# Patient Record
Sex: FEMALE | Race: WHITE | NOT HISPANIC OR LATINO | Employment: FULL TIME | URBAN - METROPOLITAN AREA
[De-identification: names, ages, dates, MRNs, and addresses within clinical notes are randomized per-mention and may not be internally consistent; named-entity substitution may affect disease eponyms.]

---

## 2017-12-14 ENCOUNTER — OFFICE VISIT (OUTPATIENT)
Dept: URGENT CARE | Facility: PHYSICIAN GROUP | Age: 47
End: 2017-12-14
Payer: COMMERCIAL

## 2017-12-14 ENCOUNTER — TELEPHONE (OUTPATIENT)
Dept: URGENT CARE | Facility: PHYSICIAN GROUP | Age: 47
End: 2017-12-14

## 2017-12-14 VITALS
WEIGHT: 224 LBS | SYSTOLIC BLOOD PRESSURE: 128 MMHG | BODY MASS INDEX: 38.24 KG/M2 | OXYGEN SATURATION: 97 % | HEART RATE: 80 BPM | TEMPERATURE: 98.1 F | DIASTOLIC BLOOD PRESSURE: 76 MMHG | RESPIRATION RATE: 16 BRPM | HEIGHT: 64 IN

## 2017-12-14 DIAGNOSIS — R05.9 COUGH: ICD-10-CM

## 2017-12-14 PROCEDURE — 99203 OFFICE O/P NEW LOW 30 MIN: CPT | Performed by: EMERGENCY MEDICINE

## 2017-12-14 RX ORDER — CODEINE PHOSPHATE AND GUAIFENESIN 10; 100 MG/5ML; MG/5ML
5 SOLUTION ORAL EVERY 6 HOURS PRN
Qty: 200 ML | Refills: 0 | Status: SHIPPED | OUTPATIENT
Start: 2017-12-14 | End: 2017-12-24

## 2017-12-14 ASSESSMENT — ENCOUNTER SYMPTOMS
SENSORY CHANGE: 0
EYE DISCHARGE: 0
SINUS PAIN: 0
VOMITING: 0
ABDOMINAL PAIN: 0
SHORTNESS OF BREATH: 0
NAUSEA: 0
EYE REDNESS: 0
NERVOUS/ANXIOUS: 0
STRIDOR: 0
COUGH: 1
SPEECH CHANGE: 0
NECK PAIN: 0
FEVER: 0
SPUTUM PRODUCTION: 0
SORE THROAT: 0

## 2017-12-14 NOTE — LETTER
December 14, 2017        Deann Torres  [address] [facility] [address]        Dear Deann:    Please ask for yesterday, today and tomorrow off from work for medical reasons.    If you have any questions or concerns, please don't hesitate to call.        Sincerely,        Josep Pérez M.D.    Electronically Signed

## 2017-12-14 NOTE — PROGRESS NOTES
"Subjective:      Deann Torres is a 47 y.o. female who presents with Cough (with drainage, thorat is sore due to coughing so hard x 4 days )            HPI  Patient is a 47-year-old female complaining of a persistent cough for 4 days.Patient denies any fever chills nausea vomiting or diarrhea. Patient's cough is essentially nonproductive.  No Known Allergies  Social History     Social History   • Marital status: Single     Spouse name: N/A   • Number of children: N/A   • Years of education: N/A     Occupational History   • Not on file.     Social History Main Topics   • Smoking status: Not on file   • Smokeless tobacco: Not on file   • Alcohol use Not on file   • Drug use: Unknown   • Sexual activity: Not on file     Other Topics Concern   • Not on file     Social History Narrative   • No narrative on file   No past medical history on file.   No current outpatient prescriptions on file prior to visit.     No current facility-administered medications on file prior to visit.    No family history on file.  Review of Systems   Constitutional: Negative for fever.   HENT: Positive for congestion. Negative for sinus pain and sore throat.    Eyes: Negative for discharge and redness.   Respiratory: Positive for cough. Negative for sputum production, shortness of breath and stridor.    Gastrointestinal: Negative for abdominal pain, nausea and vomiting.   Genitourinary: Negative.    Musculoskeletal: Negative for neck pain.   Skin: Negative for rash.   Neurological: Negative for sensory change and speech change.   Psychiatric/Behavioral: The patient is not nervous/anxious.           Objective:     /76   Pulse 80   Temp 36.7 °C (98.1 °F)   Resp 16   Ht 1.626 m (5' 4\")   Wt 101.6 kg (224 lb)   LMP 11/30/2017   SpO2 97%   BMI 38.45 kg/m²      Physical Exam   Constitutional: She appears well-developed and well-nourished.   HENT:   Head: Normocephalic and atraumatic.   Cardiovascular: Normal rate and regular rhythm.  "   Pulmonary/Chest: Breath sounds normal.   Musculoskeletal: Normal range of motion.   Skin: Skin is warm and dry.   Psychiatric: She has a normal mood and affect. Her behavior is normal.              no data  Assessment/Plan:     DX: Acute URI    I am recommending the patient initiate/ continue hydration efforts including the use of a vaporizer/humidifier/ netti pot. I also recommend the pt, initiate Mucinex DM or use Cheratussin but only at home because it contains a narcotic and will make you sleepy.  . If the patient's condition exacerbates with worsening dysphagia, shortness of breath, uncontrolled fever, headache or chest pressure he/she will return immediately to the urgent care or go to  the emergency department for further evaluation.    Josep Pérez

## 2017-12-15 ENCOUNTER — OFFICE VISIT (OUTPATIENT)
Dept: URGENT CARE | Facility: PHYSICIAN GROUP | Age: 47
End: 2017-12-15
Payer: COMMERCIAL

## 2017-12-15 VITALS
SYSTOLIC BLOOD PRESSURE: 140 MMHG | BODY MASS INDEX: 37.73 KG/M2 | HEIGHT: 64 IN | HEART RATE: 102 BPM | RESPIRATION RATE: 16 BRPM | DIASTOLIC BLOOD PRESSURE: 86 MMHG | WEIGHT: 221 LBS | TEMPERATURE: 99.1 F | OXYGEN SATURATION: 97 %

## 2017-12-15 DIAGNOSIS — R05.9 COUGH: ICD-10-CM

## 2017-12-15 DIAGNOSIS — J04.0 LARYNGITIS: Primary | ICD-10-CM

## 2017-12-15 PROCEDURE — 99212 OFFICE O/P EST SF 10 MIN: CPT | Performed by: EMERGENCY MEDICINE

## 2017-12-15 RX ORDER — METHYLPREDNISOLONE 4 MG/1
TABLET ORAL
Qty: 1 KIT | Refills: 0 | Status: SHIPPED | OUTPATIENT
Start: 2017-12-15 | End: 2018-07-24

## 2017-12-15 RX ORDER — CODEINE PHOSPHATE AND GUAIFENESIN 10; 100 MG/5ML; MG/5ML
5 SOLUTION ORAL EVERY 6 HOURS PRN
Qty: 200 ML | Refills: 0 | Status: SHIPPED | OUTPATIENT
Start: 2017-12-15 | End: 2017-12-25

## 2017-12-15 NOTE — LETTER
December 15, 2017        Deann Torres  [address] [facility] [address]        Dear Deann:    Please ask for the next 2-4 days off from work.    If you have any questions or concerns, please don't hesitate to call.        Sincerely,        Josep Pérez M.D.    Electronically Signed

## 2017-12-16 NOTE — PROGRESS NOTES
"Subjective:      Deann Torres is a 47 y.o. female who presents with Pharyngitis (seen yesterday, laryngitis)            HPI  Cough and hoarse for the past 4 days, did not get a flu shot yet. Patient complains primarily of hoarse voice. Difficulty speaking. Patient denies any chills nausea vomiting or diarrhea.    No Known Allergies   Social History     Social History   • Marital status: Single     Spouse name: N/A   • Number of children: N/A   • Years of education: N/A     Occupational History   • Not on file.     Social History Main Topics   • Smoking status: Never Smoker   • Smokeless tobacco: Never Used   • Alcohol use Not on file   • Drug use: Unknown   • Sexual activity: Not on file     Other Topics Concern   • Not on file     Social History Narrative   • No narrative on file   .......No past medical history on file.   Current Outpatient Prescriptions on File Prior to Visit   Medication Sig Dispense Refill   • guaifenesin-codeine (CHERATUSSIN AC) Solution oral solution Take 5 mL by mouth every 6 hours as needed for up to 10 days. 200 mL 0     No current facility-administered medications on file prior to visit.    No family history on file.  Review of Systems   Constitutional: Negative for chills and fever.   HENT:        Hoarseness predominant symptom.   Respiratory: Positive for cough. Negative for hemoptysis and sputum production.    Cardiovascular: Negative for chest pain.   Gastrointestinal: Negative for abdominal pain, nausea and vomiting.   Musculoskeletal: Negative for back pain and neck pain.   Skin: Negative for rash.   Neurological: Positive for speech change. Negative for sensory change.   Psychiatric/Behavioral: The patient is not nervous/anxious.           Objective:     /86   Pulse (!) 102   Temp 37.3 °C (99.1 °F)   Resp 16   Ht 1.626 m (5' 4\")   Wt 100.2 kg (221 lb)   LMP 11/30/2017   SpO2 97%   BMI 37.93 kg/m²      Physical Exam   Constitutional: She appears well-developed and " well-nourished. No distress.   HENT:   Head: Normocephalic and atraumatic.   Right Ear: External ear normal.   Left Ear: External ear normal.   Mouth/Throat: No oropharyngeal exudate.   Eyes: Conjunctivae are normal. Right eye exhibits no discharge. Left eye exhibits no discharge.   Neck:   Hoarse voice present. No stridor.   Cardiovascular: Normal heart sounds.    Heart rate 102   Pulmonary/Chest: Breath sounds normal. No stridor. No respiratory distress. She has no wheezes. She has no rales.   Abdominal: She exhibits no distension. There is no tenderness.   Lymphadenopathy:     She has no cervical adenopathy.   Skin: She is not diaphoretic.   Psychiatric: She has a normal mood and affect. Her behavior is normal.              no data  Assessment/Plan:     DX URI            Laryngitis        I am recommending the patient initiate/ continue hydration efforts including the use of a vaporizer/humidifier/ netti pot. I also recommend the pt, initiate Mucinex (if older than 4) Sudafed or Dayquil if not hypertensive. In addition the patient will initiate the prescribed prescription medication/s:Cheratussin for cough as well as Medrol Dosepak for laryngitis. If the patient's condition exacerbates with worsening dysphagia, shortness of breath, uncontrolled fever, headache or chest pressure he/she will return immediately to the urgent care or go to  the emergency department for further evaluation.    Josep Pérez

## 2017-12-19 ENCOUNTER — TELEPHONE (OUTPATIENT)
Dept: URGENT CARE | Facility: PHYSICIAN GROUP | Age: 47
End: 2017-12-19

## 2017-12-19 ASSESSMENT — ENCOUNTER SYMPTOMS
FEVER: 0
SPEECH CHANGE: 1
VOMITING: 0
BACK PAIN: 0
NECK PAIN: 0
COUGH: 1
NERVOUS/ANXIOUS: 0
SENSORY CHANGE: 0
ABDOMINAL PAIN: 0
SPUTUM PRODUCTION: 0
CHILLS: 0
NAUSEA: 0
HEMOPTYSIS: 0

## 2018-01-04 ENCOUNTER — OFFICE VISIT (OUTPATIENT)
Dept: URGENT CARE | Facility: CLINIC | Age: 48
End: 2018-01-04

## 2018-01-04 ENCOUNTER — NON-PROVIDER VISIT (OUTPATIENT)
Dept: URGENT CARE | Facility: CLINIC | Age: 48
End: 2018-01-04

## 2018-01-04 DIAGNOSIS — Z01.89 RESPIRATORY CLEARANCE EXAMINATION, ENCOUNTER FOR: ICD-10-CM

## 2018-01-04 PROCEDURE — 94375 RESPIRATORY FLOW VOLUME LOOP: CPT | Performed by: NURSE PRACTITIONER

## 2018-01-04 NOTE — PROGRESS NOTES
Deann Torres is a 47 y.o. female here for a non-provider visit for Mask Fit/ Respiratory Clearance    If abnormal was an in office provider notified today (if so, indicate provider)? Yes  Routed to PCP? No

## 2018-07-24 ENCOUNTER — OFFICE VISIT (OUTPATIENT)
Dept: URGENT CARE | Facility: PHYSICIAN GROUP | Age: 48
End: 2018-07-24
Payer: COMMERCIAL

## 2018-07-24 VITALS
RESPIRATION RATE: 16 BRPM | DIASTOLIC BLOOD PRESSURE: 84 MMHG | OXYGEN SATURATION: 96 % | HEIGHT: 65 IN | SYSTOLIC BLOOD PRESSURE: 134 MMHG | TEMPERATURE: 98.1 F | BODY MASS INDEX: 37.49 KG/M2 | WEIGHT: 225 LBS | HEART RATE: 89 BPM

## 2018-07-24 DIAGNOSIS — J01.90 ACUTE RHINOSINUSITIS: ICD-10-CM

## 2018-07-24 DIAGNOSIS — H92.01 REFERRED OTALGIA OF RIGHT EAR: ICD-10-CM

## 2018-07-24 PROCEDURE — 99214 OFFICE O/P EST MOD 30 MIN: CPT | Performed by: NURSE PRACTITIONER

## 2018-07-24 RX ORDER — AMOXICILLIN AND CLAVULANATE POTASSIUM 875; 125 MG/1; MG/1
1 TABLET, FILM COATED ORAL 2 TIMES DAILY
Qty: 14 TAB | Refills: 0 | Status: SHIPPED | OUTPATIENT
Start: 2018-07-24 | End: 2018-07-26

## 2018-07-24 NOTE — PROGRESS NOTES
"Chief Complaint   Patient presents with   • Ear Fullness     R ear [id number]        HISTORY OF PRESENT ILLNESS: Patient is a 48 y.o. female who presents today due to three days of nasal congestion, right ear pain, right ear ringing, and sinus pressure. She denies associated fever, cough, difficulty breathing, confusion, nausea, vomiting or diarrhea. She has tried OTC cold/sinus medication at home without much improvement. She admits to a history of seasonal allergies but denies significant sinus infections in the past. No known ill contacts at home. No recent antibiotic usage.     There are no active problems to display for this patient.      Allergies:Patient has no known allergies.    Current Outpatient Prescriptions Ordered in [facility]   Medication Sig Dispense Refill   • amoxicillin-clavulanate (AUGMENTIN) 875-125 MG Tab Take 1 Tab by mouth 2 times a day for 7 days. 14 Tab 0     No current Epic-ordered facility-administered medications on file.        History reviewed. No pertinent past medical history.    Social History   Substance Use Topics   • Smoking status: Never Smoker   • Smokeless tobacco: Never Used   • Alcohol use No       No family status information on file.   History reviewed. No pertinent family history.    ROS:  Review of Systems   Constitutional: Negative for fever, chills, fatigue. Negative for weight loss.   HENT: Positive for sinus pressure, right ear pain and ringing, nasal congestion. Negative for nosebleeds, neck pain.    Eyes: Negative for vision changes.   Cardiovascular: Negative for chest pain, palpitations, orthopnea and leg swelling.   Respiratory: Negative for cough, sputum production, shortness of breath and wheezing.   Gastrointestinal: Negative for abdominal pain, nausea, vomiting or diarrhea.    Skin: Negative for rash, diaphoresis.     Exam:  Blood pressure 134/84, pulse 89, temperature 36.7 °C (98.1 °F), resp. rate 16, height 1.651 m (5' 5\"), weight 102.1 kg (225 lb), SpO2 96 " %.  General: well-nourished, well-developed female in NAD  Head: normocephalic, atraumatic  Eyes: PERRLA, no conjunctival injection, acuity grossly intact, lids normal.  Ears: normal shape and symmetry, no tenderness, no discharge. External canals are without any significant edema or erythema. Tympanic membranes are without any inflammation, no effusion. Gross auditory acuity is intact.  Nose: symmetrical without tenderness, erythema and swelling noted bilateral turbinates, clear discharge. No sinus tenderness.   Mouth/Throat: reasonable hygiene, no exudates or tonsillar enlargement. Erythema is present.   Neck: no masses, range of motion within normal limits, no tracheal deviation. No obvious thyroid enlargement.   Lymph: no cervical adenopathy. No supraclavicular adenopathy.   Neuro: alert and oriented. Cranial nerves 1-12 grossly intact. No sensory deficit.   Cardiovascular: regular rate and rhythm. No edema.  Pulmonary: no distress. Chest is symmetrical with respiration, no wheezes, crackles, or rhonchi.   Musculoskeletal: no clubbing, appropriate muscle tone, gait is stable.  Skin: warm, dry, intact, no clubbing, no cyanosis, no rashes.   Psych: appropriate mood, affect, judgement.         Assessment/Plan:  1. Acute rhinosinusitis  amoxicillin-clavulanate (AUGMENTIN) 875-125 MG Tab   2. Referred otalgia of right ear         Right ear normal in appearance, most likely referred with sinus etiology. Flonase and daily allergy medication as directed.   Nasal washes with sterile saline solution daily. Sleep with HOB elevated, humidifier at night, rest, increase fluid intake.  Contingent antibiotic prescription given to patient to fill upon meeting criteria of guidelines discussed.    Supportive care, differential diagnoses, and indications for immediate follow-up discussed with patient.   Pathogenesis of diagnosis discussed including typical length and natural progression.   Instructed to return to clinic or nearest  emergency department for any change in condition, further concerns, or worsening of symptoms.  Patient states understanding of the plan of care and discharge instructions.  Instructed to make an appointment, for follow up, with her primary care provider.        Please note that this dictation was created using voice recognition software. I have made every reasonable attempt to correct obvious errors, but I expect that there are errors of grammar and possibly content that I did not discover before finalizing the note.      JERRY Winter.

## 2018-07-26 ENCOUNTER — HOSPITAL ENCOUNTER (EMERGENCY)
Facility: MEDICAL CENTER | Age: 48
End: 2018-07-26
Attending: EMERGENCY MEDICINE
Payer: COMMERCIAL

## 2018-07-26 VITALS
HEIGHT: 65 IN | DIASTOLIC BLOOD PRESSURE: 99 MMHG | SYSTOLIC BLOOD PRESSURE: 163 MMHG | WEIGHT: 221.78 LBS | RESPIRATION RATE: 19 BRPM | BODY MASS INDEX: 36.95 KG/M2 | OXYGEN SATURATION: 94 % | TEMPERATURE: 98.1 F | HEART RATE: 79 BPM

## 2018-07-26 DIAGNOSIS — H92.01 OTALGIA OF RIGHT EAR: ICD-10-CM

## 2018-07-26 DIAGNOSIS — H93.11 TINNITUS OF RIGHT EAR: ICD-10-CM

## 2018-07-26 PROCEDURE — 99284 EMERGENCY DEPT VISIT MOD MDM: CPT

## 2018-07-26 PROCEDURE — 700102 HCHG RX REV CODE 250 W/ 637 OVERRIDE(OP): Performed by: EMERGENCY MEDICINE

## 2018-07-26 PROCEDURE — A9270 NON-COVERED ITEM OR SERVICE: HCPCS | Performed by: EMERGENCY MEDICINE

## 2018-07-26 RX ORDER — AMOXICILLIN AND CLAVULANATE POTASSIUM 875; 125 MG/1; MG/1
1 TABLET, FILM COATED ORAL ONCE
Status: COMPLETED | OUTPATIENT
Start: 2018-07-26 | End: 2018-07-26

## 2018-07-26 RX ORDER — AMOXICILLIN AND CLAVULANATE POTASSIUM 875; 125 MG/1; MG/1
1 TABLET, FILM COATED ORAL 2 TIMES DAILY
Qty: 14 TAB | Refills: 0 | Status: SHIPPED | OUTPATIENT
Start: 2018-07-26 | End: 2018-08-02

## 2018-07-26 RX ADMIN — AMOXICILLIN AND CLAVULANATE POTASSIUM 1 TABLET: 875; 125 TABLET, FILM COATED ORAL at 19:08

## 2018-07-26 ASSESSMENT — PAIN SCALES - GENERAL: PAINLEVEL_OUTOF10: 0

## 2018-07-27 NOTE — ED PROVIDER NOTES
"ED Provider Note    CHIEF COMPLAINT  Chief Complaint   Patient presents with   • Ringing in Ear     Right ear.  Pt states that she has had ringing in her ear for the past few days to day has increased ringing   • Lightheadedness   • Nausea       HPI  Deann Torres is a 48 y.o. female who presents stating that she's had ringing in her right ear with pain for the last few days and 2 days ago went to the urgent care and was prescribed Flonase and Augmentin. She did not  the Augmentin and has not used Flonase either but seemed to be confused about her discharge medications as she thought she was only prescribed Flonase. She denies any vertiginous symptoms, fever, vomiting, congestion of the nose or chest and denies any trauma.    ROS  Pertinent negative for fever.    PAST MEDICAL HISTORY  No past medical history on file.    FAMILY HISTORY  No family history on file.    SOCIAL HISTORY   reports that she has never smoked. She has never used smokeless tobacco. She reports that she does not drink alcohol or use drugs.    SURGICAL HISTORY  No past surgical history on file.    CURRENT MEDICATIONS  Home Medications    **Home medications have not yet been reviewed for this encounter**         ALLERGIES  No Known Allergies    PHYSICAL EXAM  VITAL SIGNS: /103   Pulse 80   Temp 36.6 °C (97.9 °F)   Resp 16   Ht 1.651 m (5' 5\")   Wt 100.6 kg (221 lb 12.5 oz)   SpO2 97%   BMI 36.91 kg/m²    Constitutional: Well developed, Well nourished, No acute distress, Non-toxic appearance.   HENT: No evidence of nasal congestion, left ear exam is normal, right ear exam shows abnormal light reflex and bulging of the right tympanic membrane without stippling or perforation  Eyes: PERRLA, EOMI  Neck: No stridor  Lymphatic: No lymphadenopathy   Neurologic:   Psychiatric:       COURSE & MEDICAL DECISION MAKING  Pertinent Labs & Imaging studies reviewed. (See chart for details)  I think the patient was given the right medication 2 " days ago by Cm Castillo and I will continue that therapy for 7 days as I think this is very likely to be an ear infection producing tinnitus and pain. In addition I recommended Claritin-D as a decongestant and she is going to copiously hydrate, a humidifier in the bedroom and return if any significant change in symptoms    FINAL IMPRESSION  1. Tinnitus of right ear    2. Otalgia of right ear            Electronically signed by: Mikey Rivera, 7/26/2018 7:02 PM

## 2018-07-27 NOTE — ED NOTES
"Chief Complaint   Patient presents with   • Ringing in Ear     Right ear.  Pt states that she has had ringing in her ear for the past few days to day has increased ringing   • Lightheadedness   • Nausea     /103   Pulse 80   Temp 36.6 °C (97.9 °F)   Resp 16   Ht 1.651 m (5' 5\")   Wt 100.6 kg (221 lb 12.5 oz)   SpO2 97%   BMI 36.91 kg/m²     "

## 2018-07-27 NOTE — ED NOTES
Pt dc'd home with rx for augmentin, encouraged to f/u with pcp, opportunity provided to ask questions, pt ambulated out of ed with steady gait.

## 2018-08-06 ENCOUNTER — OFFICE VISIT (OUTPATIENT)
Dept: MEDICAL GROUP | Facility: PHYSICIAN GROUP | Age: 48
End: 2018-08-06
Payer: COMMERCIAL

## 2018-08-06 VITALS
SYSTOLIC BLOOD PRESSURE: 122 MMHG | OXYGEN SATURATION: 96 % | HEIGHT: 65 IN | HEART RATE: 80 BPM | WEIGHT: 224 LBS | TEMPERATURE: 97.7 F | RESPIRATION RATE: 16 BRPM | BODY MASS INDEX: 37.32 KG/M2 | DIASTOLIC BLOOD PRESSURE: 84 MMHG

## 2018-08-06 DIAGNOSIS — R42 VERTIGO: ICD-10-CM

## 2018-08-06 DIAGNOSIS — J30.9 ALLERGIC RHINITIS, UNSPECIFIED SEASONALITY, UNSPECIFIED TRIGGER: ICD-10-CM

## 2018-08-06 DIAGNOSIS — Z12.39 SCREENING FOR BREAST CANCER: ICD-10-CM

## 2018-08-06 DIAGNOSIS — Z13.6 SCREENING FOR CARDIOVASCULAR CONDITION: ICD-10-CM

## 2018-08-06 DIAGNOSIS — H92.01 EAR DISCOMFORT, RIGHT: ICD-10-CM

## 2018-08-06 DIAGNOSIS — E66.9 OBESITY (BMI 35.0-39.9 WITHOUT COMORBIDITY): ICD-10-CM

## 2018-08-06 DIAGNOSIS — Z13.0 ENCOUNTER FOR SCREENING FOR HEMATOLOGIC DISORDER: ICD-10-CM

## 2018-08-06 PROCEDURE — 99214 OFFICE O/P EST MOD 30 MIN: CPT | Performed by: NURSE PRACTITIONER

## 2018-08-06 RX ORDER — MOMETASONE FUROATE 50 UG/1
2 SPRAY, METERED NASAL DAILY
Qty: 1 INHALER | Refills: 3 | Status: SHIPPED | OUTPATIENT
Start: 2018-08-06 | End: 2018-08-16 | Stop reason: CLARIF

## 2018-08-06 ASSESSMENT — PATIENT HEALTH QUESTIONNAIRE - PHQ9: CLINICAL INTERPRETATION OF PHQ2 SCORE: 0

## 2018-08-06 NOTE — ASSESSMENT & PLAN NOTE
Was seen in ER a couple of weeks ago for tinnitus in the R hear--was given Rx for antibiotic and antihistamine.  She was also reporting vertigo.  It resolved, but as soon as she finished the antibiotics, the high pitched tinnitus returned. It does vary in pitch. There is some mild dull pain in the R ear, but there is no drainage.  Patient is concerned and would like to see ENT for further evaluation.  It was noted on physical exam that bilateral TMs were very mildly retracted but there was no signs of inflammation or infection.

## 2018-08-06 NOTE — ASSESSMENT & PLAN NOTE
This is chronic, uncontrolled.  Patient's weight is 224 pounds, BMI is 37.28.  She is going to work on this, eat healthier, try to increase her physical activity.  She does understand the risks associated with her weight.

## 2018-08-06 NOTE — PROGRESS NOTES
Chief Complaint   Patient presents with   • Ear Fullness     requesting referral to ENT         This is a 48 y.o.female patient that presents today with the following: R ear discomfort    Ear discomfort, right  Was seen in ER a couple of weeks ago for tinnitus in the R hear--was given Rx for antibiotic and antihistamine.  She was also reporting vertigo.  It resolved, but as soon as she finished the antibiotics, the high pitched tinnitus returned. It does vary in pitch. There is some mild dull pain in the R ear, but there is no drainage.  Patient is concerned and would like to see ENT for further evaluation.  It was noted on physical exam that bilateral TMs were very mildly retracted but there was no signs of inflammation or infection.    Vertigo  See additional notes    Obesity (BMI 35.0-39.9 without comorbidity) (HCC)  This is chronic, uncontrolled.  Patient's weight is 224 pounds, BMI is 37.28.  She is going to work on this, eat healthier, try to increase her physical activity.  She does understand the risks associated with her weight.      No visits with results within 1 Month(s) from this visit.   Latest known visit with results is:   [facility] on 12/08/2011   Component Date Value   • Amphetamines By Triage 12/08/2011 neg    • Urine THC 12/08/2011 neg    • Cocaine Metabolite 12/08/2011 neg    • Codeine-Morphine 12/08/2011 neg    • Phencyclidine -Pcp 12/08/2011 neg    • Drug Comment Urine Drugs 12/08/2011 neg          clinical course has been stable    History reviewed. No pertinent past medical history.    History reviewed. No pertinent surgical history.    History reviewed. No pertinent family history.    Patient has no known allergies.    Current Outpatient Prescriptions Ordered in [employer]   Medication Sig Dispense Refill   • mometasone (NASONEX) 50 MCG/ACT nasal spray Spray 2 Sprays in nose every day. 1 Inhaler 3     No current Epic-ordered facility-administered medications on file.   "      Constitutional ROS: No unexpected change in weight, No weakness, No unexplained fevers, sweats, or chills  Ear ROS: positive per HPI  Pulmonary ROS: No chronic cough, sputum, or hemoptysis, No shortness of breath, No recent change in breathing  Cardiovascular ROS: No chest pain, No edema, No palpitations  Musculoskeletal/Extremities ROS: No clubbing, No peripheral edema, No pain, redness or swelling on the joints  Neurologic ROS: Normal development, No seizures, No weakness    Physical exam:  /84   Pulse 80   Temp 36.5 °C (97.7 °F)   Resp 16   Ht 1.651 m (5' 5\")   Wt 101.6 kg (224 lb)   SpO2 96%   BMI 37.28 kg/m²   General Appearance: middle aged female, alert, no distress, obese, well groomed  Skin: Skin color, texture, turgor normal. No rashes or lesions.  Ears: positive findings: R TM - retracted, L TM - retracted  Oropharynx: Lips, mucosa, and tongue normal. Teeth and gums normal. Oropharynx moist and without lesion  Lungs: negative findings: normal respiratory rate and rhythm, lungs clear to auscultation  Heart: negative. RRR without murmur, gallop, or rubs.  No ectopy.  Abdomen: Abdomen soft, non-tender. BS normal. No masses,  No organomegaly  Musculoskeletal: negative findings: ROM of all joints is normal, no evidence of muscle atrophy, no deformities present  Neurologic: intact, CN 2-12 grossly intact    Medical decision making/discussion: pt to start allergy regimen as discussed above, and she will be referred to ENT. She is due for labs, can have done anytime before her next appt with me. Mammogram ordered, can schedule at her convenience. She is to continue working on healthy diet, regular exercise and weight loss.    Deann was seen today for ear fullness.    Diagnoses and all orders for this visit:    Ear discomfort, right  -     REFERRAL TO ENT  -     mometasone (NASONEX) 50 MCG/ACT nasal spray; Spray 2 Sprays in nose every day.    Vertigo  -     REFERRAL TO ENT  -     mometasone " (NASONEX) 50 MCG/ACT nasal spray; Spray 2 Sprays in nose every day.    Obesity (BMI 35.0-39.9 without comorbidity)  -     Patient identified as having weight management issue.  Appropriate orders and counseling given.    Allergic rhinitis, unspecified seasonality, unspecified trigger  -     REFERRAL TO ENT  -     mometasone (NASONEX) 50 MCG/ACT nasal spray; Spray 2 Sprays in nose every day.    Screening for cardiovascular condition  -     COMP METABOLIC PANEL; Future  -     LIPID PROFILE; Future    Screening for breast cancer  -     MA-SCREEN MAMMO W/CAD-BILAT; Future    Encounter for screening for hematologic disorder  -     CBC WITH DIFFERENTIAL; Future          Please note that this dictation was created using voice recognition software. I have made every reasonable attempt to correct obvious errors, but I expect that there are errors of grammar and possibly content that I did not discover before finalizing the note.

## 2018-08-06 NOTE — PATIENT INSTRUCTIONS
Stay on Claritin D, will add nasonex nasal steroid. Also use nasal saline generously, 2 sprays to each nostril 3-5 time daily    Referral to ENT    Labs before you see me again 2-3 months, sooner if needed    Mammogram--schedule at your convenience

## 2018-08-14 ENCOUNTER — TELEPHONE (OUTPATIENT)
Dept: MEDICAL GROUP | Facility: PHYSICIAN GROUP | Age: 48
End: 2018-08-14

## 2018-08-14 NOTE — TELEPHONE ENCOUNTER
MEDICATION PRIOR AUTHORIZATION NEEDED:    1. Name of Medication: mometasone    2. Requested By (Name of Pharmacy): [facility]     3. Is insurance on file current? yes    4. What is the name & phone number of the 3rd party payor?[employer]  Key: [id number]

## 2018-08-16 ENCOUNTER — TELEPHONE (OUTPATIENT)
Dept: MEDICAL GROUP | Facility: PHYSICIAN GROUP | Age: 48
End: 2018-08-16

## 2018-08-16 RX ORDER — FLUTICASONE PROPIONATE 50 MCG
1 SPRAY, SUSPENSION (ML) NASAL 2 TIMES DAILY
Qty: 2 BOTTLE | Refills: 5 | Status: SHIPPED | OUTPATIENT
Start: 2018-08-16 | End: 2018-09-06

## 2018-08-16 NOTE — TELEPHONE ENCOUNTER
[facility] has denied patients nasonex prescription. Preferred alternatives are:  Fluticasone  Triancinolone  Can we get a new prescription sent to [facility]. Thank you.

## 2018-08-19 ENCOUNTER — HOSPITAL ENCOUNTER (EMERGENCY)
Facility: MEDICAL CENTER | Age: 48
End: 2018-08-19
Attending: EMERGENCY MEDICINE
Payer: COMMERCIAL

## 2018-08-19 VITALS
DIASTOLIC BLOOD PRESSURE: 96 MMHG | HEIGHT: 65 IN | HEART RATE: 76 BPM | WEIGHT: 220.68 LBS | BODY MASS INDEX: 36.77 KG/M2 | OXYGEN SATURATION: 97 % | SYSTOLIC BLOOD PRESSURE: 141 MMHG | TEMPERATURE: 98.4 F | RESPIRATION RATE: 14 BRPM

## 2018-08-19 DIAGNOSIS — H81.11 BENIGN PAROXYSMAL POSITIONAL VERTIGO OF RIGHT EAR: ICD-10-CM

## 2018-08-19 PROCEDURE — A9270 NON-COVERED ITEM OR SERVICE: HCPCS | Performed by: EMERGENCY MEDICINE

## 2018-08-19 PROCEDURE — 99284 EMERGENCY DEPT VISIT MOD MDM: CPT

## 2018-08-19 PROCEDURE — 700102 HCHG RX REV CODE 250 W/ 637 OVERRIDE(OP): Performed by: EMERGENCY MEDICINE

## 2018-08-19 RX ORDER — MECLIZINE HYDROCHLORIDE 25 MG/1
50 TABLET ORAL ONCE
Status: COMPLETED | OUTPATIENT
Start: 2018-08-19 | End: 2018-08-19

## 2018-08-19 RX ORDER — MECLIZINE HYDROCHLORIDE 25 MG/1
25 TABLET ORAL 3 TIMES DAILY PRN
Qty: 30 TAB | Refills: 0 | Status: SHIPPED | OUTPATIENT
Start: 2018-08-19 | End: 2018-09-06

## 2018-08-19 RX ADMIN — MECLIZINE HYDROCHLORIDE 50 MG: 25 TABLET ORAL at 09:13

## 2018-08-19 ASSESSMENT — LIFESTYLE VARIABLES: DO YOU DRINK ALCOHOL: NO

## 2018-08-19 NOTE — ED NOTES
PT FEELS MUCH BETTER. PT DISCHARGED HOME, SKIN PWD, GAIT STEADY, A AND X O 3, IN NAD.  PT VERBALIZED UNDERSTANDING OF DISCHARGE INSTRUCTIONS, PRESCRIPTIONS GIVEN.

## 2018-08-19 NOTE — ED TRIAGE NOTES
Deann Torres  48 y.o.  Chief Complaint   Patient presents with   • Dizziness     Pt self ambulatory to triage room, steady gait. NAD noted.     PT states she woke up at 6am feeling dizzy. Pt states she has a hx of vertigo approx 4 years ago post a sinus infection. Pt states she saw an ENT approx 1 month ago for an ear infection. Pt finished abx 3 weeks ago.      Triage process explained to patient, educated pt to notify staff at  if s/s worsened, apologized for wait time, [facility]

## 2018-08-19 NOTE — DISCHARGE INSTRUCTIONS
Benign Positional Vertigo  Introduction  Vertigo is the feeling that you or your surroundings are moving when they are not. Benign positional vertigo is the most common form of vertigo. The cause of this condition is not serious (is benign). This condition is triggered by certain movements and positions (is positional). This condition can be dangerous if it occurs while you are doing something that could endanger you or others, such as driving.  What are the causes?  In many cases, the cause of this condition is not known. It may be caused by a disturbance in an area of the inner ear that helps your brain to sense movement and balance. This disturbance can be caused by a viral infection (labyrinthitis), head injury, or repetitive motion.  What increases the risk?  This condition is more likely to develop in:  · Women.  · People who are 50 years of age or older.  What are the signs or symptoms?  Symptoms of this condition usually happen when you move your head or your eyes in different directions. Symptoms may start suddenly, and they usually last for less than a minute. Symptoms may include:  · Loss of balance and falling.  · Feeling like you are spinning or moving.  · Feeling like your surroundings are spinning or moving.  · Nausea and vomiting.  · Blurred vision.  · Dizziness.  · Involuntary eye movement (nystagmus).  Symptoms can be mild and cause only slight annoyance, or they can be severe and interfere with daily life. Episodes of benign positional vertigo may return (recur) over time, and they may be triggered by certain movements. Symptoms may improve over time.  How is this diagnosed?  This condition is usually diagnosed by medical history and a physical exam of the head, neck, and ears. You may be referred to a health care provider who specializes in ear, nose, and throat (ENT) problems (otolaryngologist) or a provider who specializes in disorders of the nervous system (neurologist). You may have  additional testing, including:  · MRI.  · A CT scan.  · Eye movement tests. Your health care provider may ask you to change positions quickly while he or she watches you for symptoms of benign positional vertigo, such as nystagmus. Eye movement may be tested with an electronystagmogram (ENG), caloric stimulation, the Chi-Hallpike test, or the roll test.  · An electroencephalogram (EEG). This records electrical activity in your brain.  · Hearing tests.  How is this treated?  Usually, your health care provider will treat this by moving your head in specific positions to adjust your inner ear back to normal. Surgery may be needed in severe cases, but this is rare. In some cases, benign positional vertigo may resolve on its own in 2-4 weeks.  Follow these instructions at home:  Safety  · Move slowly.Avoid sudden body or head movements.  · Avoid driving.  · Avoid operating heavy machinery.  · Avoid doing any tasks that would be dangerous to you or others if a vertigo episode would occur.  · If you have trouble walking or keeping your balance, try using a cane for stability. If you feel dizzy or unstable, sit down right away.  · Return to your normal activities as told by your health care provider. Ask your health care provider what activities are safe for you.  General instructions  · Take over-the-counter and prescription medicines only as told by your health care provider.  · Avoid certain positions or movements as told by your health care provider.  · Drink enough fluid to keep your urine clear or pale yellow.  · Keep all follow-up visits as told by your health care provider. This is important.  Contact a health care provider if:  · You have a fever.  · Your condition gets worse or you develop new symptoms.  · Your family or friends notice any behavioral changes.  · Your nausea or vomiting gets worse.  · You have numbness or a “pins and needles” sensation.  Get help right away if:  · You have difficulty speaking or  moving.  · You are always dizzy.  · You faint.  · You develop severe headaches.  · You have weakness in your legs or arms.  · You have changes in your hearing or vision.  · You develop a stiff neck.  · You develop sensitivity to light.  This information is not intended to replace advice given to you by your health care provider. Make sure you discuss any questions you have with your health care provider.  Document Released: 09/25/2007 Document Revised: 05/25/2017 Document Reviewed: 04/11/2016  © 2017 [employer]    Epley Maneuver Self-Care  WHAT IS THE EPLEY MANEUVER?  The Epley maneuver is an exercise you can do to relieve symptoms of benign paroxysmal positional vertigo (BPPV). This condition is often just referred to as vertigo. BPPV is caused by the movement of tiny crystals (canaliths) inside your inner ear. The accumulation and movement of canaliths in your inner ear causes a sudden spinning sensation (vertigo) when you move your head to certain positions. Vertigo usually lasts about 30 seconds. BPPV usually occurs in just one ear. If you get vertigo when you lie on your left side, you probably have BPPV in your left ear. Your health care provider can tell you which ear is involved.   BPPV may be caused by a head injury. Many people older than 50 get BPPV for unknown reasons. If you have been diagnosed with BPPV, your health care provider may teach you how to do this maneuver. BPPV is not life threatening (benign) and usually goes away in time.   WHEN SHOULD I PERFORM THE EPLEY MANEUVER?  You can do this maneuver at home whenever you have symptoms of vertigo. You may do the Epley maneuver up to 3 times a day until your symptoms of vertigo go away.  HOW SHOULD I DO THE EPLEY MANEUVER?  1. Sit on the edge of a bed or table with your back straight. Your legs should be extended or hanging over the edge of the bed or table.    2. Turn your head residential toward the affected ear.    3. Lie backward quickly with your head  turned until you are lying flat on your back. You may want to position a pillow under your shoulders.    4. Hold this position for 30 seconds. You may experience an attack of vertigo. This is normal. Hold this position until the vertigo stops.  5. Then turn your head to the opposite direction until your unaffected ear is facing the floor.    6. Hold this position for 30 seconds. You may experience an attack of vertigo. This is normal. Hold this position until the vertigo stops.  7. Now turn your whole body to the same side as your head. Hold for another 30 seconds.    8. You can then sit back up.  ARE THERE RISKS TO THIS MANEUVER?  In some cases, you may have other symptoms (such as changes in your vision, weakness, or numbness). If you have these symptoms, stop doing the maneuver and call your health care provider. Even if doing these maneuvers relieves your vertigo, you may still have dizziness. Dizziness is the sensation of light-headedness but without the sensation of movement. Even though the Epley maneuver may relieve your vertigo, it is possible that your symptoms will return within 5 years.  WHAT SHOULD I DO AFTER THIS MANEUVER?  After doing the Epley maneuver, you can return to your normal activities. Ask your doctor if there is anything you should do at home to prevent vertigo. This may include:  · Sleeping with two or more pillows to keep your head elevated.  · Not sleeping on the side of your affected ear.  · Getting up slowly from bed.  · Avoiding sudden movements during the day.  · Avoiding extreme head movement, like looking up or bending over.  · Wearing a cervical collar to prevent sudden head movements.  WHAT SHOULD I DO IF MY SYMPTOMS GET WORSE?  Call your health care provider if your vertigo gets worse. Call your provider right way if you have other symptoms, including:   · Nausea.  · Vomiting.  · Headache.  · Weakness.  · Numbness.  · Vision changes.     This information is not intended to  replace advice given to you by your health care provider. Make sure you discuss any questions you have with your health care provider.     Document Released: 12/23/2014 Document Reviewed: 12/23/2014  [employer] Interactive Patient Education ©2016 [employer]

## 2018-08-19 NOTE — ED PROVIDER NOTES
"ED Provider Note    CHIEF COMPLAINT  Chief Complaint   Patient presents with   • Dizziness       HPI  Deann Torres is a 48 y.o. female who presents for evaluation of dizziness.  The patient states that she awoke this morning and rolled her head to the right side and had acute onset of spinning sensation.  On arrival here when she placed the gown on she twisted her head to the right and look down and had recurrence of the spinning symptoms.  She has no spinning at the time of my evaluation as she is sitting up on the gurney.  She denies any head trauma.  She has had no fevers.  She has had no vomiting.  Interestingly she had a similar episode 3 or 4 years ago.  She was seen by ENT.  She was tried on multiple medications to include scopolamine patches and Valium.  After about 3 weeks her symptoms resolved.  3 weeks ago she was seen by ENT and was placed on Augmentin for what was thought to be a right otitis media.  She is currently being scheduled for an MRI but has not yet heard from the .    REVIEW OF SYSTEMS  See HPI for further details. All other systems negative.    PAST MEDICAL HISTORY  No past medical history on file.    FAMILY HISTORY  No family history on file.    SOCIAL HISTORY  Social History     Social History   • Marital status: Single     Spouse name: N/A   • Number of children: N/A   • Years of education: N/A     Social History Main Topics   • Smoking status: Never Smoker   • Smokeless tobacco: Never Used   • Alcohol use No   • Drug use: No   • Sexual activity: Not on file     Other Topics Concern   • Not on file     Social History Narrative   • No narrative on file       SURGICAL HISTORY  No past surgical history on file.    CURRENT MEDICATIONS  Home Medications    **Home medications have not yet been reviewed for this encounter**         ALLERGIES  No Known Allergies    PHYSICAL EXAM  VITAL SIGNS: /96   Pulse 84   Temp 36.9 °C (98.4 °F)   Resp 18   Ht 1.651 m (5' 5\")   Wt 100.1 " kg (220 lb 10.9 oz)   SpO2 98%   BMI 36.72 kg/m²   Constitutional: Well developed, Well nourished, No acute distress, Non-toxic appearance.   HENT: Normocephalic, Atraumatic, TMs are slightly dull bilaterally but no overt evidence of infection.  Eyes: PERRL, EOMI, Conjunctiva normal, No discharge.  No nystagmus.  Cardiovascular: Normal heart rate.   Thorax & Lungs: No respiratory distress.  Skin: Warm, Dry.  Musculoskeletal: Good range of motion in all major joints.    Neurologic: Awake and alert, No focal deficits noted.         COURSE & MEDICAL DECISION MAKING  Pertinent Labs & Imaging studies reviewed. (See chart for details)  This 48-year-old here for evaluation of vertigo.  She has had vertiginous symptoms in the past.  She is currently being followed by Dr. Campoverde of ENT and is being scheduled for an outpatient MRI.  She is neurologically intact on exam.  She has no nystagmus.  She is treated with 50 mg of meclizine here in the emergency department upon repeat evaluation she is completely asymptomatic.  She is able to turn her head to the right and down which was previously causing her symptoms.  She is completely asymptomatic.  At this point I do not think she requires emergent imaging.  She is scheduled for an outpatient MR which I think is appropriate.  I will provide her a prescription for meclizine.  She will follow-up with her ENT as needed.  She is given a discharge instruction sheet on benign positional vertigo and on the Epley maneuver.    FINAL IMPRESSION  1.  Benign positional vertigo  2.   3.         Electronically signed by: Sudhakar Rodriguez, 8/19/2018 9:06 AM

## 2018-09-06 ENCOUNTER — TELEPHONE (OUTPATIENT)
Dept: MEDICAL GROUP | Facility: PHYSICIAN GROUP | Age: 48
End: 2018-09-06

## 2018-09-06 ENCOUNTER — OFFICE VISIT (OUTPATIENT)
Dept: MEDICAL GROUP | Facility: PHYSICIAN GROUP | Age: 48
End: 2018-09-06
Payer: COMMERCIAL

## 2018-09-06 VITALS
TEMPERATURE: 96.5 F | RESPIRATION RATE: 16 BRPM | HEIGHT: 65 IN | DIASTOLIC BLOOD PRESSURE: 62 MMHG | SYSTOLIC BLOOD PRESSURE: 110 MMHG | WEIGHT: 221 LBS | HEART RATE: 80 BPM | BODY MASS INDEX: 36.82 KG/M2 | OXYGEN SATURATION: 98 %

## 2018-09-06 DIAGNOSIS — R42 VERTIGO: ICD-10-CM

## 2018-09-06 DIAGNOSIS — F41.9 ANXIETY: ICD-10-CM

## 2018-09-06 DIAGNOSIS — F43.9 STRESS: ICD-10-CM

## 2018-09-06 DIAGNOSIS — R90.89 ABNORMAL BRAIN MRI: ICD-10-CM

## 2018-09-06 PROCEDURE — 99214 OFFICE O/P EST MOD 30 MIN: CPT | Performed by: NURSE PRACTITIONER

## 2018-09-06 RX ORDER — SERTRALINE HYDROCHLORIDE 25 MG/1
50 TABLET, FILM COATED ORAL DAILY
Qty: 180 TAB | Refills: 1 | Status: SHIPPED | OUTPATIENT
Start: 2018-09-06 | End: 2019-04-12 | Stop reason: SDUPTHER

## 2018-09-06 RX ORDER — HYDROXYZINE HYDROCHLORIDE 25 MG/1
TABLET, FILM COATED ORAL
Qty: 45 TAB | Refills: 1 | Status: SHIPPED | OUTPATIENT
Start: 2018-09-06 | End: 2020-12-08

## 2018-09-06 NOTE — ASSESSMENT & PLAN NOTE
Patient is having increased stress and anxiety especially since learning that her MRI was abnormal and she possibly has MS.  She is referred to ENT for persistent vertigo which has since resolved.  However ENT ordered MRI that does show findings consistent with MS and she is having a hard time processing all of this, she is having a hard time concentrating and she is not sleeping.  She is interested in pharmacotherapy to help her get through this at this time, this is reasonable.  We will have her start SSRI, Zoloft 50 mg daily and will add hydroxyzine 25 mg 1/2-1 pill up to 3 times a day as needed for anxiety, can take at bedtime for insomnia.  She is to follow-up with me at her already scheduled appointment in October.

## 2018-09-06 NOTE — PROGRESS NOTES
Chief Complaint   Patient presents with   • Results     fv MRI   • Anxiety         This is a 48 y.o.female patient that presents today with the following: Follow-up, review results    Abnormal brain MRI  Patient was referred to ENT for further evaluation of persistent vertigo thought to be caused by allergies or an inner ear infection.  ENT provider ordered MRI which I did have abnormal findings consistent with MS.  She has been referred to neurology and is awaiting a callback from the referral department to set up appointment.  The only symptom she has had has been vertigo no other neurological symptoms, and vertigo has resolved.    Stress  Patient is having increased stress and anxiety especially since learning that her MRI was abnormal and she possibly has MS.  She is referred to ENT for persistent vertigo which has since resolved.  However ENT ordered MRI that does show findings consistent with MS and she is having a hard time processing all of this, she is having a hard time concentrating and she is not sleeping.  She is interested in pharmacotherapy to help her get through this at this time, this is reasonable.  We will have her start SSRI, Zoloft 50 mg daily and will add hydroxyzine 25 mg 1/2-1 pill up to 3 times a day as needed for anxiety, can take at bedtime for insomnia.  She is to follow-up with me at her already scheduled appointment in October.      No visits with results within 1 Month(s) from this visit.   Latest known visit with results is:   [facility] on 12/08/2011   Component Date Value   • Amphetamines By Triage 12/08/2011 neg    • Urine THC 12/08/2011 neg    • Cocaine Metabolite 12/08/2011 neg    • Codeine-Morphine 12/08/2011 neg    • Phencyclidine -Pcp 12/08/2011 neg    • Drug Comment Urine Drugs 12/08/2011 neg          clinical course has been stable    History reviewed. No pertinent past medical history.    History reviewed. No pertinent surgical history.    History reviewed. No  "pertinent family history.    Patient has no known allergies.    Current Outpatient Prescriptions Ordered in [facility]   Medication Sig Dispense Refill   • sertraline (ZOLOFT) 25 MG tablet Take 2 Tabs by mouth every day. 180 Tab 1   • hydrOXYzine HCl (ATARAX) 25 MG Tab Take 1/2 to 1 pill up to 3 times daily as needed for anxiety and for insomnia 45 Tab 1     No current Epic-ordered facility-administered medications on file.        Constitutional ROS: No unexpected change in weight, No weakness, No unexplained fevers, sweats, or chills  Pulmonary ROS: No chronic cough, sputum, or hemoptysis, No shortness of breath, No recent change in breathing  Cardiovascular ROS: No chest pain  Musculoskeletal/Extremities ROS: No clubbing, No peripheral edema, No pain, redness or swelling on the joints  Neurologic ROS: Normal development, No seizures, No weakness, Positive for vertigo, abnormal MRI  Psychiatric ROS: Positive for stress and anxiety    Physical exam:  /62   Pulse 80   Temp 35.8 °C (96.5 °F)   Resp 16   Ht 1.651 m (5' 5\")   Wt 100.2 kg (221 lb)   SpO2 98%   BMI 36.78 kg/m²   General Appearance: Middle-aged female, alert, no distress, obese, well-groomed  Skin: Skin color, texture, turgor normal. No rashes or lesions.  Lungs: negative findings: normal respiratory rate and rhythm, lungs clear to auscultation  Musculoskeletal: negative findings: no evidence of joint instability, no evidence of muscle atrophy, no deformities present  Neurologic: intact    Medical decision making/discussion: We will have patient start Zoloft and hydroxyzine as discussed above.  She is to keep upcoming appointment with neurology as scheduled and will see her back for her follow-up appointment with me as already set at the end of October.  We will get her last ENT's last office note for review.    Deann was seen today for results and anxiety.    Diagnoses and all orders for this visit:    Abnormal brain MRI    Vertigo    Stress  -    "  sertraline (ZOLOFT) 25 MG tablet; Take 2 Tabs by mouth every day.  -     hydrOXYzine HCl (ATARAX) 25 MG Tab; Take 1/2 to 1 pill up to 3 times daily as needed for anxiety and for insomnia    Anxiety  -     sertraline (ZOLOFT) 25 MG tablet; Take 2 Tabs by mouth every day.  -     hydrOXYzine HCl (ATARAX) 25 MG Tab; Take 1/2 to 1 pill up to 3 times daily as needed for anxiety and for insomnia    Other orders  -     Obtain Results: Other (see comment); Obtain Results From: Other (see comment)          Please note that this dictation was created using voice recognition software. I have made every reasonable attempt to correct obvious errors, but I expect that there are errors of grammar and possibly content that I did not discover before finalizing the note.

## 2018-09-06 NOTE — ASSESSMENT & PLAN NOTE
Patient was referred to ENT for further evaluation of persistent vertigo thought to be caused by allergies or an inner ear infection.  ENT provider ordered MRI which I did have abnormal findings consistent with MS.  She has been referred to neurology and is awaiting a callback from the referral department to set up appointment.  The only symptom she has had has been vertigo no other neurological symptoms, and vertigo has resolved.

## 2018-09-14 ENCOUNTER — HOSPITAL ENCOUNTER (OUTPATIENT)
Dept: RADIOLOGY | Facility: MEDICAL CENTER | Age: 48
End: 2018-09-14
Attending: NURSE PRACTITIONER
Payer: COMMERCIAL

## 2018-09-14 DIAGNOSIS — Z12.31 VISIT FOR SCREENING MAMMOGRAM: ICD-10-CM

## 2018-09-14 PROCEDURE — 77067 SCR MAMMO BI INCL CAD: CPT

## 2018-09-20 ENCOUNTER — HOSPITAL ENCOUNTER (OUTPATIENT)
Dept: RADIOLOGY | Facility: MEDICAL CENTER | Age: 48
End: 2018-09-20

## 2018-09-24 DIAGNOSIS — Z86.39 HISTORY OF VITAMIN D DEFICIENCY: ICD-10-CM

## 2018-09-25 ENCOUNTER — HOSPITAL ENCOUNTER (OUTPATIENT)
Dept: LAB | Facility: MEDICAL CENTER | Age: 48
End: 2018-09-25
Attending: NURSE PRACTITIONER
Payer: COMMERCIAL

## 2018-09-25 DIAGNOSIS — Z13.6 SCREENING FOR CARDIOVASCULAR CONDITION: ICD-10-CM

## 2018-09-25 DIAGNOSIS — Z86.39 HISTORY OF VITAMIN D DEFICIENCY: ICD-10-CM

## 2018-09-25 DIAGNOSIS — Z13.0 ENCOUNTER FOR SCREENING FOR HEMATOLOGIC DISORDER: ICD-10-CM

## 2018-09-25 LAB
25(OH)D3 SERPL-MCNC: 28 NG/ML (ref 30–100)
ALBUMIN SERPL BCP-MCNC: 4.4 G/DL (ref 3.2–4.9)
ALBUMIN/GLOB SERPL: 1.6 G/DL
ALP SERPL-CCNC: 64 U/L (ref 30–99)
ALT SERPL-CCNC: 37 U/L (ref 2–50)
ANION GAP SERPL CALC-SCNC: 10 MMOL/L (ref 0–11.9)
AST SERPL-CCNC: 24 U/L (ref 12–45)
BASOPHILS # BLD AUTO: 0.5 % (ref 0–1.8)
BASOPHILS # BLD: 0.03 K/UL (ref 0–0.12)
BILIRUB SERPL-MCNC: 0.8 MG/DL (ref 0.1–1.5)
BUN SERPL-MCNC: 13 MG/DL (ref 8–22)
CALCIUM SERPL-MCNC: 9.3 MG/DL (ref 8.5–10.5)
CHLORIDE SERPL-SCNC: 104 MMOL/L (ref 96–112)
CHOLEST SERPL-MCNC: 169 MG/DL (ref 100–199)
CO2 SERPL-SCNC: 24 MMOL/L (ref 20–33)
CREAT SERPL-MCNC: 0.88 MG/DL (ref 0.5–1.4)
EOSINOPHIL # BLD AUTO: 0.07 K/UL (ref 0–0.51)
EOSINOPHIL NFR BLD: 1.2 % (ref 0–6.9)
ERYTHROCYTE [DISTWIDTH] IN BLOOD BY AUTOMATED COUNT: 41.4 FL (ref 35.9–50)
FASTING STATUS PATIENT QL REPORTED: NORMAL
GLOBULIN SER CALC-MCNC: 2.8 G/DL (ref 1.9–3.5)
GLUCOSE SERPL-MCNC: 84 MG/DL (ref 65–99)
HCT VFR BLD AUTO: 48.6 % (ref 37–47)
HDLC SERPL-MCNC: 52 MG/DL
HGB BLD-MCNC: 16.4 G/DL (ref 12–16)
IMM GRANULOCYTES # BLD AUTO: 0.02 K/UL (ref 0–0.11)
IMM GRANULOCYTES NFR BLD AUTO: 0.3 % (ref 0–0.9)
LDLC SERPL CALC-MCNC: 99 MG/DL
LYMPHOCYTES # BLD AUTO: 2.04 K/UL (ref 1–4.8)
LYMPHOCYTES NFR BLD: 33.8 % (ref 22–41)
MCH RBC QN AUTO: 30.9 PG (ref 27–33)
MCHC RBC AUTO-ENTMCNC: 33.7 G/DL (ref 33.6–35)
MCV RBC AUTO: 91.5 FL (ref 81.4–97.8)
MONOCYTES # BLD AUTO: 0.59 K/UL (ref 0–0.85)
MONOCYTES NFR BLD AUTO: 9.8 % (ref 0–13.4)
NEUTROPHILS # BLD AUTO: 3.29 K/UL (ref 2–7.15)
NEUTROPHILS NFR BLD: 54.4 % (ref 44–72)
NRBC # BLD AUTO: 0 K/UL
NRBC BLD-RTO: 0 /100 WBC
PLATELET # BLD AUTO: 222 K/UL (ref 164–446)
PMV BLD AUTO: 11.1 FL (ref 9–12.9)
POTASSIUM SERPL-SCNC: 4.4 MMOL/L (ref 3.6–5.5)
PROT SERPL-MCNC: 7.2 G/DL (ref 6–8.2)
RBC # BLD AUTO: 5.31 M/UL (ref 4.2–5.4)
SODIUM SERPL-SCNC: 138 MMOL/L (ref 135–145)
TRIGL SERPL-MCNC: 91 MG/DL (ref 0–149)
WBC # BLD AUTO: 6 K/UL (ref 4.8–10.8)

## 2018-09-25 PROCEDURE — 36415 COLL VENOUS BLD VENIPUNCTURE: CPT

## 2018-09-25 PROCEDURE — 82306 VITAMIN D 25 HYDROXY: CPT

## 2018-09-25 PROCEDURE — 80061 LIPID PANEL: CPT

## 2018-09-25 PROCEDURE — 80053 COMPREHEN METABOLIC PANEL: CPT

## 2018-09-25 PROCEDURE — 85025 COMPLETE CBC W/AUTO DIFF WBC: CPT

## 2018-10-31 ENCOUNTER — OFFICE VISIT (OUTPATIENT)
Dept: NEUROLOGY | Facility: MEDICAL CENTER | Age: 48
End: 2018-10-31
Payer: COMMERCIAL

## 2018-10-31 VITALS
SYSTOLIC BLOOD PRESSURE: 124 MMHG | TEMPERATURE: 97.7 F | HEIGHT: 65 IN | DIASTOLIC BLOOD PRESSURE: 80 MMHG | WEIGHT: 216 LBS | HEART RATE: 76 BPM | BODY MASS INDEX: 35.99 KG/M2 | OXYGEN SATURATION: 96 %

## 2018-10-31 DIAGNOSIS — R42 VERTIGO: ICD-10-CM

## 2018-10-31 DIAGNOSIS — R53.83 OTHER FATIGUE: ICD-10-CM

## 2018-10-31 DIAGNOSIS — R90.89 ABNORMAL BRAIN MRI: ICD-10-CM

## 2018-10-31 DIAGNOSIS — Z91.89 AT HIGH RISK FOR BLEEDING: ICD-10-CM

## 2018-10-31 PROCEDURE — 99205 OFFICE O/P NEW HI 60 MIN: CPT | Performed by: PSYCHIATRY & NEUROLOGY

## 2018-10-31 NOTE — ASSESSMENT & PLAN NOTE
Per Ms. Torres's description, her vertigo is most likely unrelated to her MRI findings. It sounds very much consistent with BPPV - triggered by head turning, corrected immediately by the Epley maneuvers. I do not consider this to be a clinical MS attack on either occasion. There are no brain lesions in the expected areas to explain her vertigo as a central process as well.

## 2018-10-31 NOTE — ASSESSMENT & PLAN NOTE
"Ms. Torres is a 47 yo F with no pmhx of BPPV who was incidentally found to have multiple T2/FLAIR hyperintensities on brain MRI imaging, several of which demonstrate enhancement concerning for demyelination. She has never had symptoms to suggest a clinical attack of Multiple Sclerosis, but her brain MRI is highly suggestive. 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 I recommended that we check spinal cord imaging and a lumbar puncture to check for the presence of oligoclonal bands. She stated that she does not think she has MS, which is a reasonable feeling given she has never had symptoms of MS. Her brain MRI findings do not explain her vertigo.I do believe the vertigo was true BPPV given the immediate resolution after Epley Maneuver.     Plan:  1. MRI Brain and C-spine w/wo contrast   2. LP for cell counts, protein, cultures, ACE, MS profile, and Oligoclonal Bands  3. Check labs for MS mimickers including SJogren's SS-A and SS-B, Lupus panel, B12, Lyme, HIV, RPR  4. Continue with Vitamin D supplement, 4,000 IU/day.   "

## 2018-10-31 NOTE — PROGRESS NOTES
CC: Abnormal Brain MRI      HPI:    Deann Torres is a 48 y.o. Female with pmhx of depression, anxiety, and BPPV who presents today in initial neurologic consultation for an abnormal brain MRI. The patient was referred by their primary care provider, JERRY Stafford.    Ms. Torres was seen in the on 8/19/18 for dizziness triggered by head turning to the right. She had been under the care of an ENT specialist who ordered an MRI of the intra-auricular canals which incidentally showed lesions concerning for demyelination.     Ms. Torres tells me that she first experienced vertigo - a room spinning sensation - 3-4 years ago. She rolled over from left to right and had sudden onset of vertigo. She was evaluated by an ENT who prescribed scopolamine and Valium at that time. The symptoms occurred episodically for 3 weeks until she did the Epley maneuvers on her own after looking them up on YouTube and this completely resolved her vertigo.      In early August 2018, the patient was treated with Augmentin for what was thought to be right otitis media. On 8/19/18, Ms. Torres was seen in the [facility] Room for vertigo symptoms again triggered by head turning, similar to, but milder than the previous symptoms 3-4 years ago. She notes that she was also having a stressful period in her life with the passing of her father. Her symptoms resolved on their own in the ER, and she was discharged from the ED with a plan to undergo outpatient MRI imaging of the brain. The vertigo again resolved after she did the Epley maneuvers, but the brain MRI showed the aforementioned brain lesions.     She denies any history of numbness, tingling, vision loss, eye pain, weakness, or cognitive issues. The only thing she has experienced is a constant tinnitus in her right ear that increased over the past year.       ROS:   Constitutional: No fevers or chills.  Eyes: No blurry vision or eye pain.  ENT: No  "dysphagia or hearing loss. +tinnitus + snoring  Respiratory: No cough or shortness of breath.  Cardiovascular: No chest pain or palpitations.  GI: No nausea, vomiting, or diarrhea.  : No urinary incontinence or dysuria.  Musculoskeletal: No joint swelling or arthralgias.  Skin: No skin rashes.  Neuro: No headaches, + dizziness, no tremors.  Endocrine: No heat or cold intolerance. No polydipsia or polyuria.  Psych: + depression and anxiety.  Heme/Lymph: No easy bruising or swollen lymph nodes.      Past Medical History:   Past Medical History:   Diagnosis Date   • Depression with anxiety    • Vitamin D deficiency        Past Surgical History: History reviewed. No pertinent surgical history.    Social History:   Social History     Social History   • Marital status: Single     Spouse name: N/A   • Number of children: N/A   • Years of education: N/A     Occupational History   • Not on file.     Social History Main Topics   • Smoking status: Never Smoker   • Smokeless tobacco: Never Used   • Alcohol use Yes      Comment: 2 drinks/week   • Drug use: No   • Sexual activity: Not Currently     Other Topics Concern   • Not on file     Social History Narrative   • No narrative on file       Family History:   Family History   Problem Relation Age of Onset   • Cancer Father         Lung Cancer, Prostate Cancer   • Thyroid Brother        Allergies: No Known Allergies    Physical Exam:   Ambulatory Vitals  Encounter Vitals  Temperature: 36.5 °C (97.7 °F)  Blood Pressure: 124/80  Pulse: 76  Pulse Oximetry: 96 %  Weight: 98 kg (216 lb)  Height: 165.1 cm (5' 5\")  BMI (Calculated): 35.94    Constitutional: Well-developed, well-nourished, good hygiene. Appears stated age.  Cardiovascular: RRR, with no murmurs, rubs or gallops. No carotid bruits.   Respiratory: Lungs CTA B/L, no W/R/R.   Abdomen: Soft Non-tender to Palpation. Non-distended.  Extremities: No peripheral edema, pedal pulses intact.   Skin: Warm, dry, intact. No rashes " observed.  Eyes: Sclera anicteric   Funduscopic: Optic discs flat with no evidence of papilledema or pallor.   Neurologic:   Mental Status: Awake, alert, oriented x 3.   Speech: Fluent with normal prosody.   Memory: Able to recall recent and remote events accurately.    Concentration: Attentive. Able to focus on history and follow multi-step commands.              Fund of Knowledge: Appropriate   Cranial Nerves:    CN II: PERRL. No afferent pupillary defect.    CN III, IV, VI: Good eye closure, EOMI.     CN V: Facial sensation intact and symmetric.     CN VII: No facial asymmetry.     CN VIII: Hearing intact.     CN IX and X: Palate elevates symmetrically. Normal gag reflex.    CN XI: Symmetric shoulder shrug.     CN XII: Tongue midline.    Sensory: Intact light touch, vibration and temperature.    Coordination: No evidence of past-pointing on finger to nose testing, no dysdiadochokinesia. Heel to shin intact.             DTR's: 2+ throughout without clonus.    Babinski: Toes downgoing bilaterally.   Romberg: Negative.   Movements: No tremors observed.   Musculoskeletal:    Strength: 5/5 in upper and lower extremities bilaterally.   Gait: Steady, narrow based.    Tone: Normal bulk and tone.   Joints: No swelling.     Labs Reviewed:  Results for JOSHUA MICHEL (MRN [id number]) as of 10/31/2018 11:44   Ref. Range 9/25/2018 06:32   WBC Latest Ref Range: 4.8 - 10.8 K/uL 6.0   RBC Latest Ref Range: 4.20 - 5.40 M/uL 5.31   Hemoglobin Latest Ref Range: 12.0 - 16.0 g/dL 16.4 (H)   Hematocrit Latest Ref Range: 37.0 - 47.0 % 48.6 (H)   MCV Latest Ref Range: 81.4 - 97.8 fL 91.5   MCH Latest Ref Range: 27.0 - 33.0 pg 30.9   MCHC Latest Ref Range: 33.6 - 35.0 g/dL 33.7   RDW Latest Ref Range: 35.9 - 50.0 fL 41.4   Platelet Count Latest Ref Range: 164 - 446 K/uL 222   MPV Latest Ref Range: 9.0 - 12.9 fL 11.1   Neutrophils-Polys Latest Ref Range: 44.00 - 72.00 % 54.40   Neutrophils (Absolute) Latest Ref Range: 2.00 - 7.15 K/uL 3.29     Lymphocytes Latest Ref Range: 22.00 - 41.00 % 33.80   Lymphs (Absolute) Latest Ref Range: 1.00 - 4.80 K/uL 2.04   Monocytes Latest Ref Range: 0.00 - 13.40 % 9.80   Monos (Absolute) Latest Ref Range: 0.00 - 0.85 K/uL 0.59   Eosinophils Latest Ref Range: 0.00 - 6.90 % 1.20   Eos (Absolute) Latest Ref Range: 0.00 - 0.51 K/uL 0.07   Basophils Latest Ref Range: 0.00 - 1.80 % 0.50   Baso (Absolute) Latest Ref Range: 0.00 - 0.12 K/uL 0.03   Immature Granulocytes Latest Ref Range: 0.00 - 0.90 % 0.30   Immature Granulocytes (abs) Latest Ref Range: 0.00 - 0.11 K/uL 0.02   Nucleated RBC Latest Units: /100 WBC 0.00   NRBC (Absolute) Latest Units: K/uL 0.00   Sodium Latest Ref Range: 135 - 145 mmol/L 138   Potassium Latest Ref Range: 3.6 - 5.5 mmol/L 4.4   Chloride Latest Ref Range: 96 - 112 mmol/L 104   Co2 Latest Ref Range: 20 - 33 mmol/L 24   Anion Gap Latest Ref Range: 0.0 - 11.9  10.0   Glucose Latest Ref Range: 65 - 99 mg/dL 84   Bun Latest Ref Range: 8 - 22 mg/dL 13   Creatinine Latest Ref Range: 0.50 - 1.40 mg/dL 0.88   GFR If  Latest Ref Range: >60 mL/min/1.73 m 2 >60   GFR If Non  Latest Ref Range: >60 mL/min/1.73 m 2 >60   Calcium Latest Ref Range: 8.5 - 10.5 mg/dL 9.3   AST(SGOT) Latest Ref Range: 12 - 45 U/L 24   ALT(SGPT) Latest Ref Range: 2 - 50 U/L 37   Alkaline Phosphatase Latest Ref Range: 30 - 99 U/L 64   Total Bilirubin Latest Ref Range: 0.1 - 1.5 mg/dL 0.8   Albumin Latest Ref Range: 3.2 - 4.9 g/dL 4.4   Total Protein Latest Ref Range: 6.0 - 8.2 g/dL 7.2   Globulin Latest Ref Range: 1.9 - 3.5 g/dL 2.8   A-G Ratio Latest Units: g/dL 1.6   Fasting Status Unknown Fasting   Cholesterol,Tot Latest Ref Range: 100 - 199 mg/dL 169   Triglycerides Latest Ref Range: 0 - 149 mg/dL 91   HDL Latest Ref Range: >=40 mg/dL 52   LDL Latest Ref Range: <100 mg/dL 99   25-Hydroxy   Vitamin D 25 Latest Ref Range: 30 - 100 ng/mL 28 (L)       Imaging:     Today I reviewed the patient's most recent  "MRI images with her in the examination room. I explained basic terminology of MRI's, verbalized my assessment, and answered her questions.                   MRI Brain w/wo contrast from 8/31/18  Impression:  1. Findings suggest multiple sclerosis with active inflammation. Consultation with a neurologist is recommended.   2. Unremarkable IAC's.        Assessment/Plan:  Abnormal brain MRI  Ms. Torres is a 49 yo F with no pmhx of BPPV who was incidentally found to have multiple T2/FLAIR hyperintensities on brain MRI imaging, several of which demonstrate enhancement concerning for demyelination. She has never had symptoms to suggest a clinical attack of Multiple Sclerosis, but her brain MRI is highly suggestive. 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 I recommended that we check spinal cord imaging and a lumbar puncture to check for the presence of oligoclonal bands. She stated that she does not think she has MS, which is a reasonable feeling given she has never had symptoms of MS. Her brain MRI findings do not explain her vertigo.I do believe the vertigo was true BPPV given the immediate resolution after Epley Maneuver.     Plan:  1. MRI Brain and C-spine w/wo contrast   2. LP for cell counts, protein, cultures, ACE, MS profile, and Oligoclonal Bands  3. Check labs for MS mimickers including SJogren's SS-A and SS-B, Lupus panel, B12, Lyme, HIV, RPR  4. Continue with Vitamin D supplement, 4,000 IU/day.     Vertigo  Per Ms. Torres's description, her vertigo is most likely unrelated to her MRI findings. It sounds very much consistent with BPPV - triggered by head turning, corrected immediately by the Epley maneuvers. I do not consider this to be a clinical MS attack on either occasion. There are no brain lesions in the expected areas to explain her vertigo as a central process as " well.       Greater than 50% of this 60 minute face to face encounter was devoted to disease state counseling and coordination of care.  Please see above assessment and plan for discussion.       Brandi Mayfield D.O., M.P.H  MS specialist.   Board Certified Neurologist.  Neurology Clerkship Director, [facility].    Neurology,  [facility].   Tel: [phone]  Fax: [phone]

## 2018-11-08 ENCOUNTER — HOSPITAL ENCOUNTER (OUTPATIENT)
Dept: LAB | Facility: MEDICAL CENTER | Age: 48
End: 2018-11-08
Attending: PSYCHIATRY & NEUROLOGY
Payer: COMMERCIAL

## 2018-11-08 DIAGNOSIS — Z91.89 AT HIGH RISK FOR BLEEDING: ICD-10-CM

## 2018-11-08 DIAGNOSIS — R90.89 ABNORMAL BRAIN MRI: ICD-10-CM

## 2018-11-08 DIAGNOSIS — R53.83 OTHER FATIGUE: ICD-10-CM

## 2018-11-08 LAB
APTT PPP: 28.9 SEC (ref 24.7–36)
BASOPHILS # BLD AUTO: 0.6 % (ref 0–1.8)
BASOPHILS # BLD: 0.03 K/UL (ref 0–0.12)
EOSINOPHIL # BLD AUTO: 0.04 K/UL (ref 0–0.51)
EOSINOPHIL NFR BLD: 0.8 % (ref 0–6.9)
ERYTHROCYTE [DISTWIDTH] IN BLOOD BY AUTOMATED COUNT: 40.6 FL (ref 35.9–50)
HCG SERPL QL: NEGATIVE
HCT VFR BLD AUTO: 46.2 % (ref 37–47)
HGB BLD-MCNC: 15.4 G/DL (ref 12–16)
HIV 1+2 AB+HIV1 P24 AG SERPL QL IA: NON REACTIVE
IMM GRANULOCYTES # BLD AUTO: 0.02 K/UL (ref 0–0.11)
IMM GRANULOCYTES NFR BLD AUTO: 0.4 % (ref 0–0.9)
INR PPP: 0.94 (ref 0.87–1.13)
LYMPHOCYTES # BLD AUTO: 1.7 K/UL (ref 1–4.8)
LYMPHOCYTES NFR BLD: 32.6 % (ref 22–41)
MCH RBC QN AUTO: 30.2 PG (ref 27–33)
MCHC RBC AUTO-ENTMCNC: 33.3 G/DL (ref 33.6–35)
MCV RBC AUTO: 90.6 FL (ref 81.4–97.8)
MONOCYTES # BLD AUTO: 0.46 K/UL (ref 0–0.85)
MONOCYTES NFR BLD AUTO: 8.8 % (ref 0–13.4)
NEUTROPHILS # BLD AUTO: 2.96 K/UL (ref 2–7.15)
NEUTROPHILS NFR BLD: 56.8 % (ref 44–72)
NRBC # BLD AUTO: 0 K/UL
NRBC BLD-RTO: 0 /100 WBC
PLATELET # BLD AUTO: 227 K/UL (ref 164–446)
PMV BLD AUTO: 10.9 FL (ref 9–12.9)
PROTHROMBIN TIME: 12.7 SEC (ref 12–14.6)
RBC # BLD AUTO: 5.1 M/UL (ref 4.2–5.4)
TREPONEMA PALLIDUM IGG+IGM AB [PRESENCE] IN SERUM OR PLASMA BY IMMUNOASSAY: NON REACTIVE
VIT B12 SERPL-MCNC: 431 PG/ML (ref 211–911)
WBC # BLD AUTO: 5.2 K/UL (ref 4.8–10.8)

## 2018-11-08 PROCEDURE — 84703 CHORIONIC GONADOTROPIN ASSAY: CPT

## 2018-11-08 PROCEDURE — 87389 HIV-1 AG W/HIV-1&-2 AB AG IA: CPT

## 2018-11-08 PROCEDURE — 85025 COMPLETE CBC W/AUTO DIFF WBC: CPT

## 2018-11-08 PROCEDURE — 86038 ANTINUCLEAR ANTIBODIES: CPT

## 2018-11-08 PROCEDURE — 83516 IMMUNOASSAY NONANTIBODY: CPT

## 2018-11-08 PROCEDURE — 86617 LYME DISEASE ANTIBODY: CPT

## 2018-11-08 PROCEDURE — 85730 THROMBOPLASTIN TIME PARTIAL: CPT

## 2018-11-08 PROCEDURE — 86376 MICROSOMAL ANTIBODY EACH: CPT

## 2018-11-08 PROCEDURE — 86225 DNA ANTIBODY NATIVE: CPT

## 2018-11-08 PROCEDURE — 86235 NUCLEAR ANTIGEN ANTIBODY: CPT | Mod: 91

## 2018-11-08 PROCEDURE — 85610 PROTHROMBIN TIME: CPT

## 2018-11-08 PROCEDURE — 86780 TREPONEMA PALLIDUM: CPT

## 2018-11-08 PROCEDURE — 82607 VITAMIN B-12: CPT

## 2018-11-08 PROCEDURE — 86160 COMPLEMENT ANTIGEN: CPT

## 2018-11-08 PROCEDURE — 36415 COLL VENOUS BLD VENIPUNCTURE: CPT

## 2018-11-08 PROCEDURE — 86431 RHEUMATOID FACTOR QUANT: CPT

## 2018-11-10 LAB
B BURGDOR IGG SER QL IB: NEGATIVE
B BURGDOR IGM SER QL IB: NEGATIVE
ENA SS-B IGG SER IA-ACNC: 0 AU/ML (ref 0–40)
SSA52 R0ENA AB IGG Q0420: 2 AU/ML (ref 0–40)
SSA60 R0ENA AB IGG Q0419: 1 AU/ML (ref 0–40)

## 2018-11-11 LAB
C3 SERPL-MCNC: 128 MG/DL (ref 88–201)
C4 SERPL-MCNC: 21 MG/DL (ref 10–40)
DSDNA AB TITR SER CLIF: NORMAL {TITER}
ENA SCL70 IGG SER QL: 0 AU/ML (ref 0–40)
ENA SM IGG SER-ACNC: 0 AU/ML (ref 0–40)
NUCLEAR IGG SER QL IA: DETECTED
NUCLEAR IGG TITR SER IF: ABNORMAL {TITER}
RHEUMATOID FACT SER NEPH-ACNC: 11 IU/ML (ref 0–14)
THYROPEROXIDASE AB SERPL-ACNC: <0.3 IU/ML (ref 0–9)
U1 SNRNP IGG SER QL: 0 AU/ML (ref 0–40)

## 2018-11-12 LAB — AQP4 H2O CHANNEL AB SERPL IA-ACNC: 0.4 U/ML

## 2018-11-20 ENCOUNTER — HOSPITAL ENCOUNTER (OUTPATIENT)
Dept: RADIOLOGY | Facility: MEDICAL CENTER | Age: 48
End: 2018-11-20
Attending: PSYCHIATRY & NEUROLOGY
Payer: COMMERCIAL

## 2018-11-20 DIAGNOSIS — R90.89 ABNORMAL BRAIN MRI: ICD-10-CM

## 2018-11-20 PROCEDURE — 700117 HCHG RX CONTRAST REV CODE 255: Performed by: PSYCHIATRY & NEUROLOGY

## 2018-11-20 PROCEDURE — 72156 MRI NECK SPINE W/O & W/DYE: CPT

## 2018-11-20 PROCEDURE — 72157 MRI CHEST SPINE W/O & W/DYE: CPT

## 2018-11-20 PROCEDURE — A9585 GADOBUTROL INJECTION: HCPCS | Performed by: PSYCHIATRY & NEUROLOGY

## 2018-11-20 RX ORDER — GADOBUTROL 604.72 MG/ML
10 INJECTION INTRAVENOUS ONCE
Status: COMPLETED | OUTPATIENT
Start: 2018-11-20 | End: 2018-11-20

## 2018-11-20 RX ADMIN — GADOBUTROL 10 ML: 604.72 INJECTION INTRAVENOUS at 09:27

## 2018-11-27 ENCOUNTER — HOSPITAL ENCOUNTER (OUTPATIENT)
Facility: MEDICAL CENTER | Age: 48
End: 2018-11-27
Attending: PSYCHIATRY & NEUROLOGY
Payer: COMMERCIAL

## 2018-11-27 ENCOUNTER — HOSPITAL ENCOUNTER (OUTPATIENT)
Dept: RADIOLOGY | Facility: MEDICAL CENTER | Age: 48
End: 2018-11-27
Attending: PSYCHIATRY & NEUROLOGY
Payer: COMMERCIAL

## 2018-11-27 DIAGNOSIS — R90.89 ABNORMAL BRAIN MRI: ICD-10-CM

## 2018-11-27 LAB
BURR CELLS/RBC NFR CSF MANUAL: 0 %
CLARITY CSF: CLEAR
COLOR CSF: COLORLESS
COLOR SPUN CSF: COLORLESS
GLUCOSE CSF-MCNC: 52 MG/DL (ref 40–80)
GRAM STN SPEC: NORMAL
LYMPHOCYTES NFR CSF: 91 %
MONONUC CELLS NFR CSF: 9 %
PROT CSF-MCNC: 30 MG/DL (ref 15–45)
RBC # CSF: 0 CELLS/UL
SIGNIFICANT IND 70042: NORMAL
SITE SITE: NORMAL
SOURCE SOURCE: NORMAL
SPECIMEN VOL CSF: 14 ML
TREPONEMA PALLIDUM IGG+IGM AB [PRESENCE] IN SERUM OR PLASMA BY IMMUNOASSAY: NON REACTIVE
TUBE # CSF: 3
TUBE # CSF: 3
WBC # CSF: 1 CELLS/UL (ref 0–10)

## 2018-11-27 PROCEDURE — 87070 CULTURE OTHR SPECIMN AEROBIC: CPT

## 2018-11-27 PROCEDURE — 82784 ASSAY IGA/IGD/IGG/IGM EACH: CPT | Mod: 91

## 2018-11-27 PROCEDURE — 87205 SMEAR GRAM STAIN: CPT

## 2018-11-27 PROCEDURE — 82040 ASSAY OF SERUM ALBUMIN: CPT

## 2018-11-27 PROCEDURE — 83916 OLIGOCLONAL BANDS: CPT

## 2018-11-27 PROCEDURE — 84157 ASSAY OF PROTEIN OTHER: CPT

## 2018-11-27 PROCEDURE — 89051 BODY FLUID CELL COUNT: CPT

## 2018-11-27 PROCEDURE — 82042 OTHER SOURCE ALBUMIN QUAN EA: CPT

## 2018-11-27 PROCEDURE — 82945 GLUCOSE OTHER FLUID: CPT

## 2018-11-27 PROCEDURE — 86617 LYME DISEASE ANTIBODY: CPT | Mod: 91

## 2018-11-27 PROCEDURE — 86780 TREPONEMA PALLIDUM: CPT

## 2018-11-27 PROCEDURE — 82164 ANGIOTENSIN I ENZYME TEST: CPT

## 2018-11-27 PROCEDURE — 86235 NUCLEAR ANTIGEN ANTIBODY: CPT

## 2018-11-27 PROCEDURE — 62270 DX LMBR SPI PNXR: CPT

## 2018-11-29 LAB
ACE CSF-CCNC: 1.2 U/L (ref 0–2.5)
ALB CSF/SERPL: 2.9 RATIO (ref 0–9)
ALBUMIN CSF-MCNC: 12 MG/DL (ref 0–35)
ALBUMIN SERPL-MCNC: 4090 MG/DL (ref 3500–5200)
IGG CSF-MCNC: 5.1 MG/DL (ref 0–6)
IGG SERPL-MCNC: 1030 MG/DL (ref 768–1632)
IGG SYNTH RATE SER+CSF CALC-MRATE: 14.4 MG/D
IGG/ALB CLEAR SER+CSF-RTO: 1.69 RATIO (ref 0.28–0.66)
IGG/ALB CSF: 0.42 RATIO (ref 0.09–0.25)
OLIGOCLONAL BANDS CSF ELPH-IMP: ABNORMAL
OLIGOCLONAL BANDS CSF ELPH-IMP: POSITIVE
OLIGOCLONAL BANDS CSF IEF: 14 BANDS (ref 0–1)

## 2018-11-30 LAB
BACTERIA CSF CULT: NORMAL
GRAM STN SPEC: NORMAL
SIGNIFICANT IND 70042: NORMAL
SITE SITE: NORMAL
SOURCE SOURCE: NORMAL

## 2018-12-03 LAB
B BURGDOR IGG SER QL IB: NEGATIVE
B BURGDOR IGM SER QL IB: NEGATIVE

## 2018-12-04 LAB
ENA SS-B IGG SER IA-ACNC: 0 AU/ML (ref 0–40)
SSA52 R0ENA AB IGG Q0420: 3 AU/ML (ref 0–40)
SSA60 R0ENA AB IGG Q0419: 1 AU/ML (ref 0–40)

## 2018-12-06 ENCOUNTER — OFFICE VISIT (OUTPATIENT)
Dept: NEUROLOGY | Facility: MEDICAL CENTER | Age: 48
End: 2018-12-06
Payer: COMMERCIAL

## 2018-12-06 VITALS
WEIGHT: 215 LBS | HEART RATE: 72 BPM | TEMPERATURE: 98.4 F | DIASTOLIC BLOOD PRESSURE: 88 MMHG | HEIGHT: 65 IN | SYSTOLIC BLOOD PRESSURE: 130 MMHG | OXYGEN SATURATION: 96 % | BODY MASS INDEX: 35.82 KG/M2

## 2018-12-06 DIAGNOSIS — G35 MULTIPLE SCLEROSIS (HCC): ICD-10-CM

## 2018-12-06 PROCEDURE — 99215 OFFICE O/P EST HI 40 MIN: CPT | Performed by: PSYCHIATRY & NEUROLOGY

## 2018-12-06 NOTE — PROGRESS NOTES
CC: Multiple Sclerosis       HPI:    Deann Torres is a 48 y.o. female with pmhx of depression, anxiety, and BPPV who presents today in neurologic follow up for an abnormal brain MRI. Ms. Torres denies any new symptoms since her initial consultation on 10/31/18. She underwent a lumbar puncture and MRI spinal imaging which she is here to review today.     ROS:   Constitutional: No fevers or chills.  Eyes: No blurry vision or eye pain.  ENT: No dysphagia or hearing loss.  Respiratory: No cough or shortness of breath.  Cardiovascular: No chest pain or palpitations.  GI: No nausea, vomiting, or diarrhea.  : No urinary incontinence or dysuria.  Musculoskeletal: No joint swelling or arthralgias.  Skin: No skin rashes.  Neuro: No headaches, dizziness, or tremors.  Endocrine: No heat or cold intolerance. No polydipsia or polyuria.  Psych: No depression or anxiety.  Heme/Lymph: No easy bruising or swollen lymph nodes.  All other review of systems were reviewed and were negative.     Past Medical History:   Past Medical History:   Diagnosis Date   • Depression with anxiety    • Vitamin D deficiency        Past Surgical History: History reviewed. No pertinent surgical history.    Social History:   Social History     Social History   • Marital status: Single     Spouse name: N/A   • Number of children: N/A   • Years of education: N/A     Occupational History   • Not on file.     Social History Main Topics   • Smoking status: Never Smoker   • Smokeless tobacco: Never Used   • Alcohol use Yes      Comment: 2 drinks/week   • Drug use: No   • Sexual activity: Not Currently     Other Topics Concern   • Not on file     Social History Narrative   • No narrative on file       Family Hx:   Family History   Problem Relation Age of Onset   • Cancer Father         Lung Cancer, Prostate Cancer   • Thyroid Brother        Current Medications:   Current Outpatient Prescriptions:   •  sertraline (ZOLOFT) 25 MG tablet, Take 2 Tabs by  "mouth every day., Disp: 180 Tab, Rfl: 1  •  hydrOXYzine HCl (ATARAX) 25 MG Tab, Take 1/2 to 1 pill up to 3 times daily as needed for anxiety and for insomnia (Patient not taking: Reported on 10/31/2018), Disp: 45 Tab, Rfl: 1    Allergies: No Known Allergies      Physical Exam:   Ambulatory Vitals  Encounter Vitals  Temperature: 36.9 °C (98.4 °F)  Temp src: Temporal  Blood Pressure: 130/88  Pulse: 72  Pulse Oximetry: 96 %  Weight: 97.5 kg (215 lb)  Height: 165.1 cm (5' 5\")  BMI (Calculated): 35.78  Constitutional: Well-developed, well-nourished, good hygiene. Appears stated age.  Cardiovascular: RRR, with no murmurs, rubs or gallops. No carotid bruits. No peripheral edema, pedal pulses intact.   Respiratory: Lungs CTA B/L, no W/R/R.   Skin: Warm, dry, intact. No rashes observed.  Eyes: Sclera anicteric.   Funduscopic: Optic discs flat with no evidence of papilledema or pallor. Normal posterior segments.  Neurologic:   Mental Status: Awake, alert, oriented x 3.   Speech: Fluent with normal prosody.   Memory: Able to recall recent and remote events accurately.    Concentration: Attentive. Able to focus on history and follow multi-step commands.   Fund of Knowledge: Appropriate.   Cranial Nerves:    CN II: PERRL. No afferent pupillary defect.    CN III, IV, VI: Good eye closure, EOMI.     CN V: Facial sensation intact and symmetric.     CN VII: No facial asymmetry.     CN VIII: Hearing intact.     CN IX and X: Palate elevates symmetrically. Normal gag reflex.    CN XI: Symmetric shoulder shrug.     CN XII: Tongue midline.    Sensory: Intact light touch, vibration and temperature.    Coordination: No evidence of past-pointing on finger to nose testing, no dysdiadochokinesia. Heel to shin intact.    DTR's: 2+ throughout without clonus.    Babinski: Toes downgoing bilaterally.   Romberg: Negative.   Movements: No tremors observed.   Musculoskeletal:    Strength: 5/5 in upper and lower extremities bilaterally.   Gait: " Steady, narrow based.    Tone: Normal bulk and tone.   Joints: No swelling.    EDSS: 0    Labs:  Results for JOSHUA MICHEL (MRN [id number]) as of 12/6/2018 12:58   Ref. Range 11/27/2018 13:00   Ace-Csf Latest Ref Range: 0.0 - 2.5 U/L 1.2   Number Of Tubes Unknown 3   Volume Latest Units: mL 14.0   Color-Body Fluid Unknown Colorless   Character-Body Fluid Unknown Clear   Supernatant Appearance Unknown Colorless   Total WBC Count Latest Ref Range: 0 - 10 cells/uL 1   Total RBC Count Latest Units: cells/uL 0   Crenated RBC Latest Units: % 0   Lymphs Latest Units: % 91   Mononuclear Cells - CSF Latest Units: % 9   CSF Tube Number Unknown 3   Glucose CSF Latest Ref Range: 40 - 80 mg/dL 52   Total Protein, CSF Latest Ref Range: 15 - 45 mg/dL 30   IgG CSF Latest Ref Range: 0.0 - 6.0 mg/dL 5.1   Immunoglobulin G Latest Ref Range: 768 - 1632 mg/dL 1030   B. Burgdorferi IgG WB Latest Ref Range: Negative  Negative   B. burgdorferi IgM Latest Ref Range: Negative  Negative   Syphilis, Treponemal Qual Latest Ref Range: Non Reactive  Non Reactive   SSA 52 (R0)(RAULITO) Ab, IgG Latest Ref Range: 0 - 40 AU/mL 3   SSA 60 (R0)(RAULITO) Ab, IgG Latest Ref Range: 0 - 40 AU/mL 1   Sjogren'S Anti-Ss-B Latest Ref Range: 0 - 40 AU/mL 0   CSF Albumin Latest Ref Range: 0 - 35 mg/dL 12   Serum Albumin Latest Ref Range: 3500 - 5200 mg/dL 4090   Albumin Ratio Latest Ref Range: 0.09 - 0.25 ratio 0.42 (H)   Albumin Index Latest Ref Range: 0.0 - 9.0 ratio 2.9   IgG Ratio Latest Ref Range: 0.28 - 0.66 ratio 1.69 (H)   Cns IgG Syn Latest Ref Range: <=8.0 mg/d 14.4 (H)   Oligoclonal Bands CSF Latest Ref Range: 0 - 1 Bands 14 (H)   Oligoclonal Bands CSF Latest Ref Range: Negative  Positive (A)   Interpretation Unknown See Note   Significant Indicator Unknown .   Site Unknown Tap   Source Unknown CSF     Results for JOSHUA MICHEL (MRN [id number]) as of 12/6/2018 12:58   Ref. Range 9/25/2018 06:32 11/8/2018 06:58   Vitamin B12 -True Cobalamin Latest Ref  Range: 211 - 911 pg/mL  431   25-Hydroxy   Vitamin D 25 Latest Ref Range: 30 - 100 ng/mL 28 (L)    C3 Complement Latest Ref Range: 88 - 201 mg/dL  128   Complement C4 Latest Ref Range: 10 - 40 mg/dL  21   Aquaporin 4, Receptor Ab,NMO Latest Ref Range: <=2.9 U/mL  0.4   B. Burgdorferi IgG WB Latest Ref Range: Negative   Negative   B. burgdorferi IgM Latest Ref Range: Negative   Negative   HIV Ag/Ab Combo Assay Latest Ref Range: Non Reactive   Non Reactive   Rheumatoid Factor -Neph- Latest Ref Range: 0 - 14 IU/mL  11   Syphilis, Treponemal Qual Latest Ref Range: Non Reactive   Non Reactive   Anti-Dna -Ds Latest Ref Range: None Detected   None Detected   Microsomal -Tpo- Abs Latest Ref Range: 0.0 - 9.0 IU/mL  <0.3   Anti-Scl-70 Latest Ref Range: 0 - 40 AU/mL  0   Antinuclear Antibody Latest Ref Range: None Detected   Detected (A)   Rnp Antibodies Latest Ref Range: 0 - 40 AU/mL  0   Smith Antibodies Latest Ref Range: 0 - 40 AU/mL  0   SSA 52 (R0)(RAULITO) Ab, IgG Latest Ref Range: 0 - 40 AU/mL  2   SSA 60 (R0)(RAULITO) Ab, IgG Latest Ref Range: 0 - 40 AU/mL  1   Sjogren'S Anti-Ss-B Latest Ref Range: 0 - 40 AU/mL  0   LYUDMILA Titer Latest Ref Range: <1:80   1:640 (H)       Imaging:   Today I reviewed the patient's most recent MRI images with her in the examination room. I explained basic terminology of MRI's, verbalized my assessment, and answered her questions.     MRI Cervical spine w/wo contrast from 11/20/18  1.  Multifocal minimal hazy increased T2 signal within the cervical cord which may indicate edema, myelopathy or demyelination.  This would be consistent with suspected multiple sclerosis.  2.  No focal abnormal enhancement within cervical cord to indicate active demyelination.  3.  LEFT paracentral/foraminal disc bulging at C5-6, with mild indentation of the ventral cord.  4.  Posterior disc bulging at C6-7, without significant neural compression.  5.  Small enhancing focus in the RIGHT occipital region, incompletely  "evaluated but concerning for mass or active MS plaque formation.      MRI Thoracic Spine w/wo contrast from 11/20/18  1.  No focal edema or demyelination within the thoracic cord.  No abnormal enhancement demonstrated.  2.  Minimal multilevel degenerative change of thoracic spine.    Assessment/Plan:  Multiple sclerosis (HCC)  Ms. Torres is a 47 yo F with pmhx of BPPV and anxiety who had an episode of vertigo and incidentally found to have multiple T2/FLAIR hyperintensities on brain MRI imaging suggestive of MS. Today we reviewed her recent MRI's of the cervical and thoracic spinal cord. There is a non-enhancing lesion in the cervical spine at the level of C5 suspicious for demyelination. Also incidentally seen was an enhancing lesion in the right occipital area that was not present on her brain MRI from August 2018. There were lesions seen in the thoracic spine.     I had a lengthy discussion with Ms. Torres regarding her diagnosis. I believe she has Multiple Sclerosis. Supporting this assessment is the presence of 14 unique oligoclonal bands in her spinal fluid. We discussed what an MS \"attack\" might look like and she recalled that about 6 or 7 years ago she did have an episode of left lower extremity weakness that lasted for about 6 weeks but she thought it was just a pinched nerve and it resolved after several weeks of chiropractic adjustments. I discussed with her that that very well may have been her first attack but it was not recognized at the time. Her vertigo episodes are classical for MS - resolved with Epley maneuvers - and I do not think these are at all related. We discussed things she could do now and discussed the importance of diet quality in MS. She is on a Vitamin D supplement, although this is controversial in terms of benefit in MS. We also briefly discussed treatment options and at this time my recommendation would be Tecfidera, Gilenya, or Copaxone. For her peace of mind, she would like a " second opinion and I have recommended Dr. Faisal Cox at [facility]. Alternatively, she could go to [facility] or [facility]. We will discuss further once she returns from her consultation at [facility].     Plan:  1. Referral placed to Dr. Faisal Cox at [facility]   2. Continue with Vitamin D supplementation         Greater than 50% of this 40 minute face to face follow up encounter was devoted to disease state counseling on Multiple Sclerosis and coordination of care. (see above assessment and plan for further details of discussion).       Brandi Mayfield D.O., M.P.H  MS specialist.   Board Certified Neurologist.  Neurology Clerkship Director, [facility].    Neurology,  [facility].   Tel: [phone]  Fax: [phone]

## 2018-12-06 NOTE — ASSESSMENT & PLAN NOTE
"Ms. Torres is a 47 yo F with pmhx of BPPV and anxiety who had an episode of vertigo and incidentally found to have multiple T2/FLAIR hyperintensities on brain MRI imaging suggestive of MS. Today we reviewed her recent MRI's of the cervical and thoracic spinal cord. There is a non-enhancing lesion in the cervical spine at the level of C5 suspicious for demyelination. Also incidentally seen was an enhancing lesion in the right occipital area that was not present on her brain MRI from August 2018. There were lesions seen in the thoracic spine.     I had a lengthy discussion with Ms. Torres regarding her diagnosis. I believe she has Multiple Sclerosis. Supporting this assessment is the presence of 14 unique oligoclonal bands in her spinal fluid. We discussed what an MS \"attack\" might look like and she recalled that about 6 or 7 years ago she did have an episode of left lower extremity weakness that lasted for about 6 weeks but she thought it was just a pinched nerve and it resolved after several weeks of chiropractic adjustments. I discussed with her that that very well may have been her first attack but it was not recognized at the time. Her vertigo episodes are classical for MS - resolved with Epley maneuvers - and I do not think these are at all related. We discussed things she could do now and discussed the importance of diet quality in MS. She is on a Vitamin D supplement, although this is controversial in terms of benefit in MS. We also briefly discussed treatment options and at this time my recommendation would be Tecfidera, Gilenya, or Copaxone. For her peace of mind, she would like a second opinion and I have recommended Dr. Faisal Cox at [facility]. Alternatively, she could go to [facility] or [facility]. We will discuss further once she returns from her consultation at [facility].     Plan:  1. Referral placed to Dr. Faisal Cox at [facility]   2. Continue with Vitamin D supplementation     "

## 2019-01-21 ENCOUNTER — OFFICE VISIT (OUTPATIENT)
Dept: NEUROLOGY | Facility: MEDICAL CENTER | Age: 49
End: 2019-01-21
Payer: COMMERCIAL

## 2019-01-21 VITALS
WEIGHT: 223 LBS | HEART RATE: 88 BPM | SYSTOLIC BLOOD PRESSURE: 112 MMHG | HEIGHT: 65 IN | OXYGEN SATURATION: 95 % | DIASTOLIC BLOOD PRESSURE: 64 MMHG | TEMPERATURE: 98.8 F | BODY MASS INDEX: 37.15 KG/M2

## 2019-01-21 DIAGNOSIS — G35 MULTIPLE SCLEROSIS (HCC): ICD-10-CM

## 2019-01-21 PROCEDURE — 99214 OFFICE O/P EST MOD 30 MIN: CPT | Performed by: PSYCHIATRY & NEUROLOGY

## 2019-01-21 ASSESSMENT — PATIENT HEALTH QUESTIONNAIRE - PHQ9: CLINICAL INTERPRETATION OF PHQ2 SCORE: 0

## 2019-01-22 NOTE — PROGRESS NOTES
CC: Multiple Sclerosis       HPI:    Deann Torres is a pleasant 49 y.o. female who presents today in neurologic follow up for multiple sclerosis.  Ms. Torres was last seen on December 6, 2018.  Since that time she has been seen at [facility] by Dr. Faisal Cox who agrees with her diagnosis of multiple sclerosis.  He recommended that she consider disease modifying treatment and they discussed several treatment options including Tecfidera, and Gilenya.  He also recommended she start taking a vitamin D supplement.  She denies any new symptoms since her last visit, however, she did discuss her prior symptoms with Dr. Cox and he also suspects that her previous left leg numbness was not due to a pinched nerve as previously suspected and very well may have been her first clinical MS attack which went unrecognized.    MS History:  Diagnosed: December 2016  Lumbar puncture:    11/27/18 Oligoclonal bands positive, IgG synth 14.4,   First presentation: Vertigo in 2018, possibly had left lower extremity numbness years prior   Disease modifying treatment:    Current: None   Previous: None  Former or other neurologist: Dr. Faisal Cox at [facility]   Last attack:   Last MRI: 11/20/18      ROS:   Constitutional: No fevers or chills.  Eyes: No blurry vision or eye pain.  ENT: No dysphagia or hearing loss.  Respiratory: No cough or shortness of breath.  Cardiovascular: No chest pain or palpitations.  GI: No nausea, vomiting, or diarrhea.  : No urinary incontinence or dysuria.  Musculoskeletal: No joint swelling or arthralgias.  Skin: No skin rashes.  Neuro: No headaches, dizziness, or tremors.  Endocrine: No heat or cold intolerance. No polydipsia or polyuria.  Psych: No depression or anxiety.  Heme/Lymph: No easy bruising or swollen lymph nodes.  All other review of systems were reviewed and were negative.     Past Medical History:   Past Medical History:   Diagnosis Date   • Depression with anxiety    • Vitamin D deficiency   "      Past Surgical History: History reviewed. No pertinent surgical history.    Social History:   Social History     Social History   • Marital status: Single     Spouse name: N/A   • Number of children: N/A   • Years of education: N/A     Occupational History   • Not on file.     Social History Main Topics   • Smoking status: Never Smoker   • Smokeless tobacco: Never Used   • Alcohol use Yes      Comment: 2 drinks/week   • Drug use: No   • Sexual activity: Not Currently     Other Topics Concern   • Not on file     Social History Narrative   • No narrative on file       Family Hx:   Family History   Problem Relation Age of Onset   • Cancer Father         Lung Cancer, Prostate Cancer   • Thyroid Brother        Current Medications:   Current Outpatient Prescriptions:   •  Cholecalciferol (VITAMIN D3) 5000 units Cap, Take 1 Cap by mouth every day., Disp: , Rfl:   •  sertraline (ZOLOFT) 25 MG tablet, Take 2 Tabs by mouth every day., Disp: 180 Tab, Rfl: 1  •  hydrOXYzine HCl (ATARAX) 25 MG Tab, Take 1/2 to 1 pill up to 3 times daily as needed for anxiety and for insomnia, Disp: 45 Tab, Rfl: 1    Allergies: No Known Allergies      Physical Exam:   Ambulatory Vitals  Encounter Vitals  Temperature: 37.1 °C (98.8 °F)  Temp src: Temporal  Blood Pressure: 112/64  Pulse: 88  Pulse Oximetry: 95 %  Weight: 101.2 kg (223 lb)  Height: 165.1 cm (5' 5\")  BMI (Calculated): 37.11      Constitutional: Well-developed, well-nourished, good hygiene. Appears stated age.  Cardiovascular: RRR, with no murmurs, rubs or gallops. No carotid bruits. No peripheral edema, pedal pulses intact.   Respiratory: Lungs CTA B/L, no W/R/R.   Skin: Warm, dry, intact. No rashes observed.  Eyes: Sclera anicteric.   Funduscopic: Optic discs flat with no evidence of papilledema or pallor. Normal posterior segments.  Neurologic:   Mental Status: Awake, alert, oriented x 3.   Speech: Fluent with normal prosody.   Memory: Able to recall recent and remote events " accurately.    Concentration: Attentive. Able to focus on history and follow multi-step commands.   Fund of Knowledge: Appropriate.   Cranial Nerves:    CN II: PERRL. No afferent pupillary defect.    CN III, IV, VI: Good eye closure, EOMI.     CN V: Facial sensation intact and symmetric.     CN VII: No facial asymmetry.     CN VIII: Hearing intact.     CN IX and X: Palate elevates symmetrically. Normal gag reflex.    CN XI: Symmetric shoulder shrug.     CN XII: Tongue midline.    Sensory: Intact light touch, vibration and temperature.    Coordination: No evidence of past-pointing on finger to nose testing, no dysdiadochokinesia. Heel to shin intact.    DTR's: 2+ throughout without clonus.    Babinski: Toes downgoing bilaterally.   Romberg: Negative.   Movements: No tremors observed.   Musculoskeletal:    Strength: 5/5 in upper and lower extremities bilaterally.   Gait: Steady, narrow based.    Tone: Normal bulk and tone.   Joints: No swelling.     Assessment/Plan:  Multiple sclerosis (HCC)  MsCheo Torres is a 48-year-old female with a past medical history of BPPV, anxiety, who had an episode of vertigo found to have multiple T2 and flair hyperintensities on brain MRI imaging suggestive of MS.  Her oligoclonal bands were elevated in the spinal fluid relative to her blood sample.  It is most likely that she had clinical attack of MS symptoms when her left lower extremity went numb several years ago but this went unrecognized at the time.  She has thoracic spinal cord lesions in addition to brain lesions as well which fulfill criteria for separation space.  Oligoclonal bands fulfill separation and time.  Dr. Cox concurs with her diagnosis, and she is ready to discuss treatment today.  We reviewed in depth the side effect profiles, efficacy, and risks associated with multiple agents including Tecfidera, Gilenya, Tysabri, and Ocrevus. Today we filled out start forms for Tecfidera. Side effects of Tecfidera were discussed  including GI symptoms such as diarrhea which improves after the first month in most people, and skin flushing which can be minimized by taking the medication with food and a baby Aspirin half an hour before the medication. We discussed that Tecfidera has been associated with PML in a few individuals whose lymphocyte counts were found to be low and for this reason we would have to check their WBC and lymphocyte counts every 6 months.     Plan:  1. Start Tecfidera   2. Continue Vitamin D supplement  3. Next MRI's in 6 months  4. CBC q 6 months - next due in July 2019.  5. She will return for a follow up visit in 6-8 weeks to see how she is tolerating the medication.      Brandi Mayfield D.O., M.P.H  MS specialist.   Board Certified Neurologist.  Neurology Clerkship Director, [facility].    Neurology,  [facility].   Tel: [phone]  Fax: [phone]

## 2019-01-22 NOTE — ASSESSMENT & PLAN NOTE
Ms. Torres is a 48-year-old female with a past medical history of BPPV, anxiety, who had an episode of vertigo found to have multiple T2 and flair hyperintensities on brain MRI imaging suggestive of MS.  Her oligoclonal bands were elevated in the spinal fluid relative to her blood sample.  It is most likely that she had clinical attack of MS symptoms when her left lower extremity went numb several years ago but this went unrecognized at the time.  She has thoracic spinal cord lesions in addition to brain lesions as well which fulfill criteria for separation space.  Oligoclonal bands fulfill separation and time.  Dr. Cox concurs with her diagnosis, and she is ready to discuss treatment today.  We reviewed in depth the side effect profiles, efficacy, and risks associated with multiple agents including Tecfidera, Gilenya, Tysabri, and Ocrevus. Today we filled out start forms for Tecfidera. Side effects of Tecfidera were discussed including GI symptoms such as diarrhea which improves after the first month in most people, and skin flushing which can be minimized by taking the medication with food and a baby Aspirin half an hour before the medication. We discussed that Tecfidera has been associated with PML in a few individuals whose lymphocyte counts were found to be low and for this reason we would have to check their WBC and lymphocyte counts every 6 months.     Plan:  1. Start Tecfidera   2. Continue Vitamin D supplement  3. Next MRI's in 6 months  4. CBC q 6 months - next due in July 2019.  5. She will return for a follow up visit in 6-8 weeks to see how she is tolerating the medication.

## 2019-03-19 ENCOUNTER — OFFICE VISIT (OUTPATIENT)
Dept: NEUROLOGY | Facility: MEDICAL CENTER | Age: 49
End: 2019-03-19
Payer: COMMERCIAL

## 2019-03-19 VITALS
HEART RATE: 71 BPM | SYSTOLIC BLOOD PRESSURE: 118 MMHG | DIASTOLIC BLOOD PRESSURE: 62 MMHG | BODY MASS INDEX: 37.32 KG/M2 | WEIGHT: 224 LBS | TEMPERATURE: 98.1 F | OXYGEN SATURATION: 96 % | HEIGHT: 65 IN

## 2019-03-19 DIAGNOSIS — G35 MULTIPLE SCLEROSIS (HCC): ICD-10-CM

## 2019-03-19 PROCEDURE — 99214 OFFICE O/P EST MOD 30 MIN: CPT | Performed by: PSYCHIATRY & NEUROLOGY

## 2019-03-19 RX ORDER — DIMETHYL FUMARATE 240 MG/1
240 CAPSULE ORAL 2 TIMES DAILY
COMMUNITY
End: 2020-01-22 | Stop reason: SDUPTHER

## 2019-03-19 NOTE — ASSESSMENT & PLAN NOTE
Ms. Torres is a 48-year-old female with a past medical history of BPPV, anxiety, who had an episode of vertigo found to have multiple T2 and flair hyperintensities on brain MRI imaging suggestive of MS.  Her oligoclonal bands were elevated in the spinal fluid relative to her blood sample.  It is most likely that she had clinical attack of MS symptoms when her left lower extremity went numb several years ago but this went unrecognized at the time.  She has thoracic spinal cord lesions in addition to brain lesions as well which fulfill criteria for separation space.  Oligoclonal bands fulfill separation and time.      Ms. Torres is currently clinically stable on Tecfidera.  No symptoms of MS relapse.  Today we discussed our plan moving forward now that she is up and running on a disease modifying treatment.    Plan:  1.  Continue Tecfidera 240 mg twice a day  2. Continue Vitamin D supplement up to 4000 IUs daily  3. Next MRI's in August 2019  4. CBC q 6 months -also in August 2019

## 2019-03-19 NOTE — PROGRESS NOTES
CC: Multiple Sclerosis       HPI:    Deann Torres is a pleasant 49 y.o. female who presents today in neurologic follow up for multiple sclerosis.  Since her last visit, Ms. Torres has started treatment with Tecfidera.  She denies any side effects from the medication.  No symptoms suggestive of MS relapse.  Her only symptoms previously had been a self-limited episode of left foot drop which may have been an unrecognized MS attack at that time.    MS History:  Diagnosed: December 2016  Lumbar puncture:    11/27/18 Oligoclonal bands positive, IgG synth 14.4   First presentation: Vertigo in 2018, possibly had left lower extremity numbness years prior   Disease modifying treatment:    Current: Tecfidera - full dose 2/19   Previous: None  Former or other neurologist: Dr. Faisal Cox at [facility]   Last attack:   Last MRI: Brain, c-spine and t-spine 11/20/18      ROS:   Constitutional: No fevers or chills.  Eyes: No blurry vision or eye pain.  ENT: No dysphagia or hearing loss.  Respiratory: No cough or shortness of breath.  Cardiovascular: No chest pain or palpitations.  GI: No nausea, vomiting, or diarrhea.  : No urinary incontinence or dysuria.  Musculoskeletal: No joint swelling or arthralgias.  Skin: No skin rashes.  Neuro: No headaches, dizziness, or tremors.  Endocrine: No heat or cold intolerance. No polydipsia or polyuria.  Psych: No depression or anxiety.  Heme/Lymph: No easy bruising or swollen lymph nodes.  All other review of systems were reviewed and were negative.     Past Medical History:   Past Medical History:   Diagnosis Date   • Depression with anxiety    • Vitamin D deficiency        Past Surgical History: History reviewed. No pertinent surgical history.    Social History:   Social History     Social History   • Marital status: Single     Spouse name: N/A   • Number of children: N/A   • Years of education: N/A     Occupational History   • Not on file.     Social History Main Topics   • Smoking  "status: Never Smoker   • Smokeless tobacco: Never Used   • Alcohol use Yes      Comment: 2 drinks/week   • Drug use: No   • Sexual activity: Not Currently     Other Topics Concern   • Not on file     Social History Narrative   • No narrative on file       Family Hx:   Family History   Problem Relation Age of Onset   • Cancer Father         Lung Cancer, Prostate Cancer   • Thyroid Brother        Current Medications:   Current Outpatient Prescriptions:   •  Dimethyl Fumarate (TECFIDERA) 240 MG CAPSULE DELAYED RELEASE, Take 240 mg by mouth 2 Times a Day., Disp: , Rfl:   •  Cholecalciferol (VITAMIN D3) 5000 units Cap, Take 1 Cap by mouth every day., Disp: , Rfl:   •  sertraline (ZOLOFT) 25 MG tablet, Take 2 Tabs by mouth every day., Disp: 180 Tab, Rfl: 1  •  hydrOXYzine HCl (ATARAX) 25 MG Tab, Take 1/2 to 1 pill up to 3 times daily as needed for anxiety and for insomnia, Disp: 45 Tab, Rfl: 1    Allergies: No Known Allergies      Physical Exam:   Ambulatory Vitals  Encounter Vitals  Temperature: 37.1 °C (98.8 °F)  Temp src: Temporal  Blood Pressure: 112/64  Pulse: 88  Pulse Oximetry: 95 %  Weight: 101.2 kg (223 lb)  Height: 165.1 cm (5' 5\")  BMI (Calculated): 37.11      Constitutional: Well-developed, well-nourished, good hygiene. Appears stated age.  Cardiovascular: RRR, with no murmurs, rubs or gallops. No carotid bruits. No peripheral edema, pedal pulses intact.   Respiratory: Lungs CTA B/L, no W/R/R.   Skin: Warm, dry, intact. No rashes observed.  Eyes: Sclera anicteric.   Funduscopic: Optic discs flat with no evidence of papilledema or pallor. Normal posterior segments.  Neurologic:   Mental Status: Awake, alert, oriented x 3.   Speech: Fluent with normal prosody.   Memory: Able to recall recent and remote events accurately.    Concentration: Attentive. Able to focus on history and follow multi-step commands.   Fund of Knowledge: Appropriate.   Cranial Nerves:    CN II: PERRL. No afferent pupillary defect.    CN " III, IV, VI: Good eye closure, EOMI.     CN V: Facial sensation intact and symmetric.     CN VII: No facial asymmetry.     CN VIII: Hearing intact.     CN IX and X: Palate elevates symmetrically. Normal gag reflex.    CN XI: Symmetric shoulder shrug.     CN XII: Tongue midline.    Sensory: Intact light touch, vibration and temperature.    Coordination: No evidence of past-pointing on finger to nose testing, no dysdiadochokinesia. Heel to shin intact.    DTR's: 2+ throughout without clonus.    Babinski: Toes downgoing bilaterally.   Romberg: Negative.   Movements: No tremors observed.   Musculoskeletal:    Strength: 5/5 in upper and lower extremities bilaterally.   Gait: Steady, narrow based.    Tone: Normal bulk and tone.   Joints: No swelling.     Assessment/Plan:  Multiple sclerosis (HCC)  Ms. Torres is a 48-year-old female with a past medical history of BPPV, anxiety, who had an episode of vertigo found to have multiple T2 and flair hyperintensities on brain MRI imaging suggestive of MS.  Her oligoclonal bands were elevated in the spinal fluid relative to her blood sample.  It is most likely that she had clinical attack of MS symptoms when her left lower extremity went numb several years ago but this went unrecognized at the time.  She has thoracic spinal cord lesions in addition to brain lesions as well which fulfill criteria for separation space.  Oligoclonal bands fulfill separation and time.      Ms. Torres is currently clinically stable on Tecfidera.  No symptoms of MS relapse.  Today we discussed our plan moving forward now that she is up and running on a disease modifying treatment.    Plan:  1.  Continue Tecfidera 240 mg twice a day  2. Continue Vitamin D supplement up to 4000 IUs daily  3. Next MRI's in August 2019  4. CBC q 6 months -also in August 2019        Brandi Mayfield D.O., M.P.H  MS specialist.   Board Certified Neurologist.  Neurology Clerkship Director, [facility]   Medicine.    Neurology,  [facility].   Tel: [phone]  Fax: [phone]

## 2019-04-04 DIAGNOSIS — R06.83 SNORING: ICD-10-CM

## 2019-04-12 DIAGNOSIS — F41.9 ANXIETY: ICD-10-CM

## 2019-04-12 DIAGNOSIS — F43.9 STRESS: ICD-10-CM

## 2019-04-12 NOTE — TELEPHONE ENCOUNTER
Was the patient seen in the last year in this department? Yes    Does patient have an active prescription for medications requested? No     Received Request Via: Pharmacy      Pt met protocol?: Yes   Pt last ov 9/2018

## 2019-04-14 RX ORDER — SERTRALINE HYDROCHLORIDE 25 MG/1
TABLET, FILM COATED ORAL
Qty: 180 TAB | Refills: 1 | Status: SHIPPED | OUTPATIENT
Start: 2019-04-14 | End: 2020-05-14 | Stop reason: SDUPTHER

## 2019-04-25 ENCOUNTER — PATIENT MESSAGE (OUTPATIENT)
Dept: MEDICAL GROUP | Facility: PHYSICIAN GROUP | Age: 49
End: 2019-04-25

## 2019-04-25 ENCOUNTER — TELEPHONE (OUTPATIENT)
Dept: MEDICAL GROUP | Facility: PHYSICIAN GROUP | Age: 49
End: 2019-04-25

## 2019-04-26 ENCOUNTER — TELEPHONE (OUTPATIENT)
Dept: MEDICAL GROUP | Facility: PHYSICIAN GROUP | Age: 49
End: 2019-04-26

## 2019-04-26 NOTE — TELEPHONE ENCOUNTER
FINAL PRIOR AUTHORIZATION STATUS:    1.  Name of Medication & Dose: sertraline 25 mg     2. Prior Auth Status: Approved through 4/25/19 to 7/24/19     3. Action Taken: Pharmacy Notified: yes Patient Notified: yes

## 2019-04-26 NOTE — TELEPHONE ENCOUNTER
MEDICATION PRIOR AUTHORIZATION:    1. Name of Medication: sertraline 20 mg    2. Requested By (Name of Pharmacy): [facility]     3. Is insurance on file current? yes    4. What is the name & phone number of the 3rd party payor?     [facility]   Key: [id number]

## 2019-06-03 ENCOUNTER — SLEEP CENTER VISIT (OUTPATIENT)
Dept: SLEEP MEDICINE | Facility: MEDICAL CENTER | Age: 49
End: 2019-06-03
Payer: COMMERCIAL

## 2019-06-03 VITALS
RESPIRATION RATE: 16 BRPM | OXYGEN SATURATION: 94 % | SYSTOLIC BLOOD PRESSURE: 120 MMHG | DIASTOLIC BLOOD PRESSURE: 76 MMHG | HEART RATE: 86 BPM | BODY MASS INDEX: 38.32 KG/M2 | WEIGHT: 230 LBS | HEIGHT: 65 IN

## 2019-06-03 DIAGNOSIS — R06.83 SNORING: ICD-10-CM

## 2019-06-03 DIAGNOSIS — G47.30 SLEEP APNEA, UNSPECIFIED TYPE: ICD-10-CM

## 2019-06-03 DIAGNOSIS — G35 MULTIPLE SCLEROSIS (HCC): ICD-10-CM

## 2019-06-03 DIAGNOSIS — G47.10 HYPERSOMNOLENCE: ICD-10-CM

## 2019-06-03 PROCEDURE — 99204 OFFICE O/P NEW MOD 45 MIN: CPT | Performed by: INTERNAL MEDICINE

## 2019-06-03 NOTE — PROGRESS NOTES
"CC:  Excessive daytime fatigue and somnolence, suspected sleep apnea hypopnea syndrome.    HPI:   Ms. Torres is a 49-year-old woman referred by FLORENCE Younger and Dr. Brandi Mayfield to assist in the evaluation and management of suspected sleep disordered breathing.    She was evaluated last summer for positional dizziness.  An MRI scan of the brain suggested demyelinating disease.  She was evaluated by Dr. Mayfield and further imaging demonstrated changes in the thoracic spine and right occipital region consistent with demyelination.  She underwent lumbar puncture and spinal fluid did demonstrate oligoclonal bands.  She was also evaluated at [facility] and she has been on Tecfidera for about 6 months without new symptoms.  In the course of her evaluation, symptoms suggesting sleep disordered breathing were identified.    She has a regular sleep schedule, as confirmed by the sleep diary, with bedtime at about 9 PM and she falls asleep quickly.  She awakens briefly on several occasions throughout most nights.  She arises at 4:30 AM on workdays but sleeps until about 8 AM on days off.  She says that she is \"always tired\" in the mornings but does not describe grogginess or morning headaches.  She has been told in the past that she does snore but we do not have information on possible cyclic breathing or apnea.  She is tired during the day and regularly dozes off during quiet moments or as a passenger in a motor vehicle.  Her [place] sleepiness score is elevated at 12 points.  She drinks caffeinated beverages moderately but does not otherwise use substances to induce sleep or to maintain wakefulness.  She does not have symptoms suggesting narcolepsy, parasomnia or restless leg syndrome.  She has not previously been evaluated for sleep issues.        Patient Active Problem List    Diagnosis Date Noted   • Multiple sclerosis (HCC) 12/06/2018   • Stress 09/06/2018   • Abnormal brain MRI 09/06/2018   • Anxiety 09/06/2018 " "  • Ear discomfort, right 08/06/2018   • Vertigo 08/06/2018   • Obesity (BMI 35.0-39.9 without comorbidity) (HCC) 08/06/2018   • Allergic rhinitis 08/06/2018       Past Medical History:   Diagnosis Date   • Chickenpox    • Depression with anxiety    • Vitamin D deficiency        History reviewed. No pertinent surgical history.    Family History   Problem Relation Age of Onset   • Cancer Father         Lung Cancer, Prostate Cancer   • Thyroid Brother        Social History     Social History   • Marital status: Single     Spouse name: N/A   • Number of children: N/A   • Years of education: N/A     Occupational History   • Not on file.     Social History Main Topics   • Smoking status: Never Smoker   • Smokeless tobacco: Never Used   • Alcohol use Yes      Comment: 2 drinks/week   • Drug use: No   • Sexual activity: Not Currently     Other Topics Concern   • Not on file     Social History Narrative   • No narrative on file       Current Outpatient Prescriptions   Medication Sig Dispense Refill   • sertraline (ZOLOFT) 25 MG tablet TAKE 1 TABLET BY MOUTH EVERY DAY FOR 2 WEEKS, THEN INCREASE TO 2 TABLETS EVERY  Tab 1   • Dimethyl Fumarate (TECFIDERA) 240 MG CAPSULE DELAYED RELEASE Take 240 mg by mouth 2 Times a Day.     • Cholecalciferol (VITAMIN D3) 5000 units Cap Take 1 Cap by mouth every day.     • hydrOXYzine HCl (ATARAX) 25 MG Tab Take 1/2 to 1 pill up to 3 times daily as needed for anxiety and for insomnia (Patient not taking: Reported on 6/3/2019) 45 Tab 1     No current facility-administered medications for this visit.     \"CURRENT RX\"      Allergies: Patient has no known allergies.      ROS  Positive for the sleep and neurologic issues reviewed above.  She wears corrective lenses but has noted diplopia or visual loss.  She has episodic tinnitus chronic rhinitis or loss of auditory acuity.  She denies chest pain or palpitations, unusual cough or dyspnea, heartburn or abdominal discomfort.  All other aspects " "of the CPT review of systems process are negative as documented in the attached self history form.      Physical Exam:   /76 (BP Location: Right arm, Patient Position: Sitting, BP Cuff Size: Large adult)   Pulse 86   Resp 16   Ht 1.651 m (5' 5\")   Wt 104.3 kg (230 lb)   SpO2 94%   BMI 38.27 kg/m²    Head and neck examination demonstrates no mucosal lesion, purulent drainage or evident polyps. The pharynx is benign with a Mallampati III presentation. The neck is supple without thyromegaly. On chest examination there are symmetrical bilateral breath sounds without rales, wheezing or consolidation. On cardiac examination, the apical impulse and heart sounds are normal and the rhythm is regular. There is no murmur, gallop or rub and no jugular venous distention. The abdomen is soft with active bowel sounds and no palpable hepatosplenomegaly, mass, guarding or rebound. The extremities show no clubbing, cyanosis or edema and no signs of deep venous thrombosis. There is no warmth, redness, tenderness or palpable venous cord in the calves. The skin is clear, warm and dry. There is no unusual peripheral lymphadenopathy. Peripheral pulses are palpable in all 4 extremities. On neurologic examination, cranial nerve function is intact, motor tone is symmetrical, and the patient is alert, oriented and responsive.       Problems:  1. Sleep apnea, unspecified type  She presents with significant daytime fatigue and somnolence and a history of snoring and non-refreshing sleep.  She has a crowded posterior pharyngeal airway and a body mass index of 38.  The clinical probability of sleep apnea hypopnea syndrome is significant and could include elements of both obstructive sleep apnea and central apnea given her history of multiple sclerosis. Accurate diagnosis and effective treatment are required not only to improve levels of daytime alertness but also to reduce the cardiac and neurologic risks associated with untreated " sleep apnea. We have discussed diagnostic options including in-laboratory, attended polysomnography and home sleep testing. We have also discussed treatment options including airway pressurization, reconstructive otolaryngologic surgery, dental appliances and weight management.    2. Hypersomnolence  Probably related to the suspected sleep disordered breathing.  She is spending sufficient nightly time in bed and does not seem to have issues with sleep hygiene.  Her multiple sclerosis could contribute to daytime fatigue.    3. Snoring    4. BMI 38.0-38.9,adult  We have talked about the role of weight management in the treatment of sleep disordered breathing and the ways in which that might be accomplished.    5. Multiple sclerosis (HCC)  On Tecfidera per Neurology.      Plan:   Home sleep test.    Follow-up visit afterwards to discuss test results and treatment options.    We appreciate the opportunity to assist in her care.

## 2019-06-18 ENCOUNTER — HOME STUDY (OUTPATIENT)
Dept: SLEEP MEDICINE | Facility: MEDICAL CENTER | Age: 49
End: 2019-06-18
Attending: INTERNAL MEDICINE
Payer: COMMERCIAL

## 2019-06-18 DIAGNOSIS — G47.30 SLEEP APNEA, UNSPECIFIED TYPE: ICD-10-CM

## 2019-06-18 PROCEDURE — 95806 SLEEP STUDY UNATT&RESP EFFT: CPT | Performed by: INTERNAL MEDICINE

## 2019-06-19 NOTE — PROCEDURES
The patient is a 49-year-old female with snoring, nonrestorative sleep and daytime somnolence.  Home polysomnography is requested for evaluation of suspected TRU.    A total recording time of 300 minutes was obtained with good-quality data noted.    There were 374 snore episodes noted associated with obstructive respiratory apneas and hypopneas.  The overall DANIEL: 6.6/h and increased to 11.6/hr supine.  There was oxygen desaturation below 90% for 4.3 minutes, to a pau of 86%.  Heart rate ranged from  BPM.    Interpretation:  Mild obstructive sleep apnea syndrome, associated with minimal oxygen desaturations.    Recommendations:  Treatment options for mild TRU include airway pressurization (CPAP versus AutoPap), an oral appliance or potentially UPPP.  The patient should follow-up in the sleep clinic to discuss appropriate therapy.  In general, maintaining a healthy BMI and avoidance/minimizing alcohol, sedatives and muscle relaxants can be of added benefit.

## 2019-07-02 ENCOUNTER — SLEEP CENTER VISIT (OUTPATIENT)
Dept: SLEEP MEDICINE | Facility: MEDICAL CENTER | Age: 49
End: 2019-07-02
Payer: COMMERCIAL

## 2019-07-02 VITALS
DIASTOLIC BLOOD PRESSURE: 90 MMHG | RESPIRATION RATE: 16 BRPM | SYSTOLIC BLOOD PRESSURE: 130 MMHG | HEART RATE: 91 BPM | BODY MASS INDEX: 37.65 KG/M2 | HEIGHT: 65 IN | OXYGEN SATURATION: 95 % | WEIGHT: 226 LBS

## 2019-07-02 DIAGNOSIS — Z78.9 NONSMOKER: ICD-10-CM

## 2019-07-02 DIAGNOSIS — G47.33 OBSTRUCTIVE SLEEP APNEA: ICD-10-CM

## 2019-07-02 DIAGNOSIS — G35 MULTIPLE SCLEROSIS (HCC): ICD-10-CM

## 2019-07-02 DIAGNOSIS — I10 HYPERTENSION, UNSPECIFIED TYPE: ICD-10-CM

## 2019-07-02 PROCEDURE — 99214 OFFICE O/P EST MOD 30 MIN: CPT | Performed by: NURSE PRACTITIONER

## 2019-07-02 NOTE — PROGRESS NOTES
Chief Complaint   Patient presents with   • Apnea     HSS Results        HPI:  Deann Torres is a 49 y.o. year old female here today for follow-up on HST results.  Please see Dr. Andrade's dictation 6/3/2019 for full history.    History of MS and followed by neurology.  Patient is currently on Tecfidera.  BMI 37.    Sleep symptoms consist of always being tired but denies morning headaches or grogginess.  She was told in the past she snored.  She goes off during quite moments or as a passenger in a motor vehicle.  EPS score 12.  She drinks caffeinated beverages moderately to maintain wakefulness.  She denied narcolepsy, parasomnia restless leg symptoms.  She was never been previously evaluated for sleep apnea.    Home sleep study 6/18/2019 indicated overall mild TRU with an DANIEL of 6.6/h and while supine 11.6/h.  Minimum oxygen saturation of 86% patient was less than 90% for 4.3 minutes of study time.  Heart rate ranged from 59 to 102 bpm.  I reviewed testing with patient.  We reviewed treatment modalities.    She denies significant cardiac or respiratory symptoms.          ROS: As per HPI and otherwise negative if not stated.    Past Medical History:   Diagnosis Date   • Chickenpox    • Depression with anxiety    • Vitamin D deficiency        No past surgical history on file.    Family History   Problem Relation Age of Onset   • Cancer Father         Lung Cancer, Prostate Cancer   • Thyroid Brother        Social History     Social History   • Marital status: Single     Spouse name: N/A   • Number of children: N/A   • Years of education: N/A     Occupational History   • Not on file.     Social History Main Topics   • Smoking status: Never Smoker   • Smokeless tobacco: Never Used   • Alcohol use Yes      Comment: 2 drinks/week   • Drug use: No   • Sexual activity: Not Currently     Other Topics Concern   • Not on file     Social History Narrative   • No narrative on file       Allergies as of 07/02/2019   • (No  "Known Allergies)        @Vital signs for this encounter:  Vitals:    07/02/19 1509 07/02/19 1515   Height: 1.651 m (5' 5\")    Weight: 102.5 kg (226 lb)    Weight % change since last entry.: 0 %    BP: 130/90    Pulse: 91    BMI (Calculated): 37.61    Resp: 16    O2 sat % room air:  95 %       Current medications as of today   Current Outpatient Prescriptions   Medication Sig Dispense Refill   • sertraline (ZOLOFT) 25 MG tablet TAKE 1 TABLET BY MOUTH EVERY DAY FOR 2 WEEKS, THEN INCREASE TO 2 TABLETS EVERY  Tab 1   • Dimethyl Fumarate (TECFIDERA) 240 MG CAPSULE DELAYED RELEASE Take 240 mg by mouth 2 Times a Day.     • Cholecalciferol (VITAMIN D3) 5000 units Cap Take 1 Cap by mouth every day.     • hydrOXYzine HCl (ATARAX) 25 MG Tab Take 1/2 to 1 pill up to 3 times daily as needed for anxiety and for insomnia 45 Tab 1     No current facility-administered medications for this visit.          Physical Exam:   Gen:           Alert and oriented, No apparent distress. Mood and affect appropriate, normal interaction with examiner.  Eyes:          PERRL, EOM intact, sclere white, conjunctive moist. Glasses.  Ears:          Not examined.   Hearing:     Grossly intact.  Nose:          Normal, no lesions or deformities.  Dentition:    Good dentition.  Oropharynx:   Tongue normal.  Mallampati Classification: not examined.  Neck:        Supple, trachea midline, no masses.  Respiratory Effort: No intercostal retractions or use of accessory muscles.   Lung Auscultation:      Clear to auscultation bilaterally; no rales, rhonchi or wheezing.  CV:            Regular rate and rhythm. No murmurs, rubs or gallops.  Abd:           Not examined.   Lymphadenopathy: Not examined.  Gait and Station: Normal.  Digits and Nails: No clubbing, cyanosis, petechiae, or nodes.   Cranial Nerves: II-XII grossly intact.  Skin:        No rashes, lesions or ulcers noted.               Ext:           No cyanosis or edema.      Assessment:  1. " Obstructive sleep apnea     2. Multiple sclerosis (HCC)     3. Hypertension, unspecified type     4. Nonsmoker     5. BMI 37.0-37.9, adult  Height And Weight       Immunizations:    Flu:declines  Pneumovax 23:not due  Prevnar 13:not due    Plan:  1.  Patient is amenable to starting airway pressurization therapy.  DME auto CPAP 5 to 15 cm H2O nightly.  Patient understands the need to use device every night for >4hrs to meet compliance standards for insurance purposes.  Patient understands they may have mask fits within first 30 days of therapy covered by insurance to obtain best fit.  2.  Discussed sleep hygiene.  3.  Encourage weight loss.  4.  Follow-up with neurology as scheduled.  5.  Follow-up in 2 months with compliance card, sooner if needed.  Consider follow-up overnight oximetry once acclimated to therapy, but overall oxygen levels are adequate during home sleep study.    Please note that this dictation was created using voice recognition software. I have made every reasonable attempt to correct obvious errors, but it is possible there are errors of grammar and possibly content that I did not discover before finalizing the note.

## 2019-07-02 NOTE — LETTER
YOKASTA King  [facility]   [address] [place] [address]  Phone: [phone] - Fax: [phone]           Encounter Date: 7/2/2019  Provider: YOKASTA King  Location of Care: [facility]      Patient:   Deann Torres   MR Number: [id number]   YOB: 1970     PROGRESS NOTE:  Chief Complaint   Patient presents with   • Apnea     HSS Results        HPI:  Deann Torres is a 49 y.o. year old female here today for follow-up on HST results.  Please see Dr. Andrade's dictation 6/3/2019 for full history.    History of MS and followed by neurology.  Patient is currently on Tecfidera.  BMI 37.    Sleep symptoms consist of always being tired but denies morning headaches or grogginess.  She was told in the past she snored.  She goes off during quite moments or as a passenger in a motor vehicle.  EPS score 12.  She drinks caffeinated beverages moderately to maintain wakefulness.  She denied narcolepsy, parasomnia restless leg symptoms.  She was never been previously evaluated for sleep apnea.    Home sleep study 6/18/2019 indicated overall mild TRU with an DANIEL of 6.6/h and while supine 11.6/h.  Minimum oxygen saturation of 86% patient was less than 90% for 4.3 minutes of study time.  Heart rate ranged from 59 to 102 bpm.  I reviewed testing with patient.  We reviewed treatment modalities.    She denies significant cardiac or respiratory symptoms.          ROS: As per HPI and otherwise negative if not stated.    Past Medical History:   Diagnosis Date   • Chickenpox    • Depression with anxiety    • Vitamin D deficiency        No past surgical history on file.    Family History   Problem Relation Age of Onset   • Cancer Father         Lung Cancer, Prostate Cancer   • Thyroid Brother        Social History     Social History   • Marital status: Single     Spouse name: N/A   • Number of children: N/A    "  • Years of education: N/A     Occupational History   • Not on file.     Social History Main Topics   • Smoking status: Never Smoker   • Smokeless tobacco: Never Used   • Alcohol use Yes      Comment: 2 drinks/week   • Drug use: No   • Sexual activity: Not Currently     Other Topics Concern   • Not on file     Social History Narrative   • No narrative on file       Allergies as of 07/02/2019   • (No Known Allergies)        @Vital signs for this encounter:  Vitals:    07/02/19 1509 07/02/19 1515   Height: 1.651 m (5' 5\")    Weight: 102.5 kg (226 lb)    Weight % change since last entry.: 0 %    BP: 130/90    Pulse: 91    BMI (Calculated): 37.61    Resp: 16    O2 sat % room air:  95 %       Current medications as of today   Current Outpatient Prescriptions   Medication Sig Dispense Refill   • sertraline (ZOLOFT) 25 MG tablet TAKE 1 TABLET BY MOUTH EVERY DAY FOR 2 WEEKS, THEN INCREASE TO 2 TABLETS EVERY  Tab 1   • Dimethyl Fumarate (TECFIDERA) 240 MG CAPSULE DELAYED RELEASE Take 240 mg by mouth 2 Times a Day.     • Cholecalciferol (VITAMIN D3) 5000 units Cap Take 1 Cap by mouth every day.     • hydrOXYzine HCl (ATARAX) 25 MG Tab Take 1/2 to 1 pill up to 3 times daily as needed for anxiety and for insomnia 45 Tab 1     No current facility-administered medications for this visit.          Physical Exam:   Gen:           Alert and oriented, No apparent distress. Mood and affect appropriate, normal interaction with examiner.  Eyes:          PERRL, EOM intact, sclere white, conjunctive moist. Glasses.  Ears:          Not examined.   Hearing:     Grossly intact.  Nose:          Normal, no lesions or deformities.  Dentition:    Good dentition.  Oropharynx:   Tongue normal.  Mallampati Classification: not examined.  Neck:        Supple, trachea midline, no masses.  Respiratory Effort: No intercostal retractions or use of accessory muscles.   Lung Auscultation:      Clear to auscultation bilaterally; no rales, rhonchi or " wheezing.  CV:            Regular rate and rhythm. No murmurs, rubs or gallops.  Abd:           Not examined.   Lymphadenopathy: Not examined.  Gait and Station: Normal.  Digits and Nails: No clubbing, cyanosis, petechiae, or nodes.   Cranial Nerves: II-XII grossly intact.  Skin:        No rashes, lesions or ulcers noted.               Ext:           No cyanosis or edema.      Assessment:  1. Obstructive sleep apnea     2. Multiple sclerosis (HCC)     3. Hypertension, unspecified type     4. Nonsmoker     5. BMI 37.0-37.9, adult  Height And Weight       Immunizations:    Flu:declines  Pneumovax 23:not due  Prevnar 13:not due    Plan:  1.  Patient is amenable to starting airway pressurization therapy.  DME auto CPAP 5 to 15 cm H2O nightly.  Patient understands the need to use device every night for >4hrs to meet compliance standards for insurance purposes.  Patient understands they may have mask fits within first 30 days of therapy covered by insurance to obtain best fit.  2.  Discussed sleep hygiene.  3.  Encourage weight loss.  4.  Follow-up with neurology as scheduled.  5.  Follow-up in 2 months with compliance card, sooner if needed.  Consider follow-up overnight oximetry once acclimated to therapy, but overall oxygen levels are adequate during home sleep study.    Please note that this dictation was created using voice recognition software. I have made every reasonable attempt to correct obvious errors, but it is possible there are errors of grammar and possibly content that I did not discover before finalizing the note.      Electronically signed by YOKASTA King  on 07/02/19    Brandi Mayfield D.O.  [address] [place] [address] [place] [address]  VIA In Basket

## 2019-07-02 NOTE — PATIENT INSTRUCTIONS
Sleep Apnea  Sleep apnea is a condition that affects breathing. People with sleep apnea have moments during sleep when their breathing pauses briefly or gets shallow. Sleep apnea can cause these symptoms:  · Trouble staying asleep.  · Sleepiness or tiredness during the day.  · Irritability.  · Loud snoring.  · Morning headaches.  · Trouble concentrating.  · Forgetting things.  · Less interest in sex.  · Being sleepy for no reason.  · Mood swings.  · Personality changes.  · Depression.  · Waking up a lot during the night to pee (urinate).  · Dry mouth.  · Sore throat.  Follow these instructions at home:  · Make any changes in your routine that your doctor recommends.  · Eat a healthy, well-balanced diet.  ·   · Take over-the-counter and prescription medicines only as told by your doctor.  · Avoid using alcohol, calming medicines (sedatives), and narcotic medicines.  · Take steps to lose weight if you are overweight.  · If you were given a machine (device) to use while you sleep, use it only as told by your doctor.  · Do not use any tobacco products, such as cigarettes, chewing tobacco, and e-cigarettes. If you need help quitting, ask your doctor.  · Keep all follow-up visits as told by your doctor. This is important.  Contact a doctor if:  · The machine that you were given to use during sleep is uncomfortable or does not seem to be working.  · Your symptoms do not get better.  · Your symptoms get worse.  Get help right away if:  · Your chest hurts.  · You have trouble breathing in enough air (shortness of breath).  · You have an uncomfortable feeling in your back, arms, or stomach.  · You have trouble talking.  · One side of your body feels weak.  · A part of your face is hanging down (drooping).  These symptoms may be an emergency. Do not wait to see if the symptoms will go away. Get medical help right away. Call your local emergency services (911 in the [place] Do not drive yourself to the hospital.   This  "information is not intended to replace advice given to you by your health care provider. Make sure you discuss any questions you have with your health care provider.  Document Released: 09/26/2009 Document Revised: 08/13/2017 Document Reviewed: 09/26/2016  [employer] Interactive Patient Education © 2017 [employer]     CPAP and BIPAP Information    CPAP and BIPAP are methods of helping you breathe with the use of air pressure. CPAP stands for \"continuous positive airway pressure.\" BIPAP stands for \"bi-level positive airway pressure.\" In both methods, air is blown into your air passages to help keep you breathing well. With CPAP, the amount of pressure stays the same while you breathe in and out. CPAP is most commonly used for obstructive sleep apnea. For obstructive sleep apnea, CPAP works by holding your airways open so that they do not collapse when your muscles relax during sleep. BIPAP is similar to CPAP except the amount of pressure is increased when you inhale. This helps you take larger breaths. Your health care provider will recommend whether CPAP or BIPAP would be more helpful for you.   WHY ARE CPAP AND BIPAP TREATMENTS USED?  CPAP or BIPAP can be helpful if you have:   · Sleep apnea.    · Chronic obstructive pulmonary disease (COPD).    · Diseases that weaken the muscles of the chest, including muscular dystrophy or neurological diseases such as amyotrophic lateral sclerosis (ALS).  · Other problems that cause breathing to be weak, abnormal, or difficult.    HOW IS CPAP OR BIPAP ADMINISTERED?  Both CPAP and BIPAP are provided by a small machine with a flexible plastic tube that attaches to a plastic mask. The mask fits on your face, and air is blown into your air passages through your nose or mouth. The amount of pressure that is used to blow the air into your air passages can be set on the machine. Your health care provider will determine the pressure setting that should be used based on your individual " needs.  WHEN SHOULD CPAP OR BIPAP BE USED?  In most cases, the mask is worn only when sleeping. Generally, you will need to wear the mask throughout the night and during the daytime if you take a nap. In a few cases involving certain medical conditions, people also need to wear the mask at other times when they are awake. Follow your health care provider's instructions for when to use the machine.   USING THE MASK  · Because the mask needs to be snug, some people feel a trapped or closed-in feeling (claustrophobic) when first using the mask. You may need to get used to the mask gradually. To do this, you can first hold the mask loosely over your nose or mouth. Gradually apply the mask more snugly. You can also gradually increase the amount of time that you use the mask.  · Masks are available in various types and sizes. Some fit over your mouth and nose, and some fit over just your nose. If your mask does not fit well, talk to your health care provider about getting a different one.  · If you are using a nasal mask and you tend to breathe through your mouth, a chin strap may be applied to help keep your mouth closed.    · The CPAP and BIPAP machines have alarms that may sound if the mask comes off or develops a leak.  · If you have trouble with the mask, it is very important that you talk to your health care provider about finding a way to make the mask easier to tolerate. Do not stop using the mask. This could have a negative impact on your health.  TIPS FOR USING THE MACHINE  · Place your CPAP or BIPAP machine on a secure table or stand near an electrical outlet.    · Know where the on-off switch is located on the machine.  · Follow your health care provider's instructions for how to set the pressure on your machine and when you should use it.  · Do not eat or drink while the CPAP or BIPAP machine is on. Food or fluids could get pushed into your lungs by the pressure of the CPAP or BIPAP.  · Do not smoke. Tobacco  smoke residue can damage the machine.    · For home use, CPAP and BIPAP machines can be rented or purchased through home health care companies. Many different brands of machines are available. Renting a machine before purchasing may help you find out which particular machine works well for you.  SEEK IMMEDIATE MEDICAL CARE IF:  · You have redness or open areas around your nose or mouth where the mask fits.    · You have trouble operating the CPAP or BIPAP machine.    · You cannot tolerate wearing the CPAP or BIPAP mask.    This information is not intended to replace advice given to you by your health care provider. Make sure you discuss any questions you have with your health care provider.  Document Released: 09/15/2005 Document Revised: 01/08/2016 Document Reviewed: 07/17/2014  [employer] Interactive Patient Education © 2017 [employer]

## 2019-07-09 ENCOUNTER — OFFICE VISIT (OUTPATIENT)
Dept: NEUROLOGY | Facility: MEDICAL CENTER | Age: 49
End: 2019-07-09
Payer: COMMERCIAL

## 2019-07-09 VITALS
DIASTOLIC BLOOD PRESSURE: 78 MMHG | HEART RATE: 85 BPM | BODY MASS INDEX: 37.49 KG/M2 | WEIGHT: 225 LBS | TEMPERATURE: 97.5 F | SYSTOLIC BLOOD PRESSURE: 116 MMHG | HEIGHT: 65 IN | OXYGEN SATURATION: 97 %

## 2019-07-09 DIAGNOSIS — G35 MULTIPLE SCLEROSIS (HCC): ICD-10-CM

## 2019-07-09 PROCEDURE — 99214 OFFICE O/P EST MOD 30 MIN: CPT | Performed by: PSYCHIATRY & NEUROLOGY

## 2019-07-09 ASSESSMENT — ENCOUNTER SYMPTOMS
DIZZINESS: 0
MEMORY LOSS: 0
DEPRESSION: 0
VOMITING: 0
NAUSEA: 0
FEVER: 0
FALLS: 0
CHILLS: 0
HEADACHES: 0
SHORTNESS OF BREATH: 0
BLURRED VISION: 0
DIARRHEA: 1
CONSTIPATION: 0
DOUBLE VISION: 0

## 2019-07-09 NOTE — ASSESSMENT & PLAN NOTE
Ms. Torres is a 49-year-old female with a past medical history of BPPV and anxiety, who had an episode of vertigo in 2018, subsequently found to have multiple T2 and flair hyperintensities on brain MRI imaging highly suspicious for MS. Prior left lower extremity numbness several years prior may have been an unrecognized initial attack.  Thoracic spinal cord lesions observed on MRI imaging in addition to brain lesions, thus fulfilling Torres criteria for separation space. CSF positive for unique oligoclonal bands fulfilling 2017 Torres criteria for separation in time.      Ms. Torres is currently clinically stable on Tecfidera with some mild flushing and loose stool symptoms.  No symptoms of MS relapse.  Today we discussed our plan moving forward now that she is up and running on a disease modifying treatment. We will check new baseline MRI imaging in August 2019. She will also have labwork to monitor her lymphocyte counts and LFT's.     We discussed plan to refer to outside provider if [facility] discontinues contract with [facility].     Plan:  1.  Continue Tecfidera 240 mg twice a day.  2. Continue Vitamin D supplement up to 4000 IUs daily  3. MRI's of brain, c-spine, and t-spine w/wo contrast ordered today - to be completed in August 2019.  4. Labs ordered: CBC and CMP. Goal absolute lymphocyte count >500.

## 2019-07-09 NOTE — PROGRESS NOTES
CC: Multiple Sclerosis       HPI:    Deann Torres is a pleasant 49 y.o. female who presents today in neurologic follow up for multiple sclerosis. She was last seen on 3/19/19. She is currently on disease modifying treatment with Tecfidera. She reports occasional flushing side effects which resolve after taking a baby aspirin. She additionally mentions occasional loose stools, but nothing consistent. Ms. Torres denies any symptoms suggestive of MS relapse. She has been seeing a chiropractor for occasional arm dysesthesias which occur when she is lying on her arm at night. She does not attribute these symptoms to her MS, but possibly just due to her mattress.      MS History:  Diagnosed: December 2016  Lumbar puncture:    11/27/18 Oligoclonal bands positive, IgG synth 14.4   First presentation: Vertigo in 2018, possibly had left lower extremity numbness years prior, self limited episode of left foot drop possibly unrecognized MS attack.   Disease modifying treatment:    Current: Tecfidera - full dose 2/19   Previous: None  Former or other neurologist: Dr. Faisal Cox at [place]         Review of Systems   Constitutional: Negative for chills, fever and malaise/fatigue.   Eyes: Negative for blurred vision and double vision.   Respiratory: Negative for shortness of breath.    Cardiovascular: Negative for chest pain.   Gastrointestinal: Positive for diarrhea. Negative for constipation, nausea and vomiting.        Occasional loose stool.   Genitourinary: Negative for frequency and urgency.   Musculoskeletal: Negative for falls.   Neurological: Negative for dizziness and headaches.   Psychiatric/Behavioral: Negative for depression and memory loss.   All other ROS are negative.      Current Outpatient Prescriptions:   •  sertraline (ZOLOFT) 25 MG tablet, TAKE 1 TABLET BY MOUTH EVERY DAY FOR 2 WEEKS, THEN INCREASE TO 2 TABLETS EVERY DAY, Disp: 180 Tab, Rfl: 1  •  Dimethyl Fumarate (TECFIDERA) 240 MG CAPSULE DELAYED  "RELEASE, Take 240 mg by mouth 2 Times a Day., Disp: , Rfl:   •  Cholecalciferol (VITAMIN D3) 5000 units Cap, Take 1 Cap by mouth every day., Disp: , Rfl:   •  hydrOXYzine HCl (ATARAX) 25 MG Tab, Take 1/2 to 1 pill up to 3 times daily as needed for anxiety and for insomnia (Patient not taking: Reported on 7/9/2019), Disp: 45 Tab, Rfl: 1    No Known Allergies      No changes in medical, social, family, or surgical history.     Physical Exam:   Ambulatory Vitals  Encounter Vitals  Temperature: 36.4 °C (97.5 °F)  Temp src: Temporal  Blood Pressure: 116/78  Pulse: 85  Pulse Oximetry: 97 %  Weight: 102.1 kg (225 lb)  Height: 165.1 cm (5' 5\")  BMI (Calculated): 37.44  Constitutional: Well-developed, well-nourished, good hygiene. Appears stated age.  Respiratory: Normal respiratory effort.   Abdomen: Non-distended.  Extremities: No peripheral edema, pedal pulses intact.   Skin: Warm, dry, intact. No rashes observed. Tattoos on upper extremities.  Eyes: Sclera anicteric. No nystagmus observed.   Neurologic:   Mental Status: Awake and alert.    Speech: Speech is fluent with normal prosody.   .           Fund of Knowledge: Appropriate   Cranial Nerves:    CN II: Pupils are equal and react appropriately. No APD noted.    CN III, IV, VI: Good eye closure. Extraocular movements are intact.     CN V: Facial sensation is intact and symmetric.     CN VII: No evidence of facial droop.     CN VIII: Hearing is grossly intact.    CN IX and X: Palate elevates symmetrically.    CN XI: Shoulder shrug is symmetric.     CN XII: Tongue is midline.   Sensory: Sensation is intact to vibration and temperature.    Coordination: There is no discoordination detected with finger tapping or finger to nose testing.        DTR's: Reflexes are 2+ and symmetrical.   Babinski: Toes are downgoing bilaterally.    Romberg: Deferred.   Movements: No tremors noted.  Musculoskeletal:    Strength:   Deltoids      R 5/5 L 5/5  Biceps        R 5/5 L 5/5  Triceps   "     R 5/5 L 5/5  Wrist Ext    R 5/5 L 5/5  Finger Flex R 5/5 L 5/5  Finger Ext   R 5/5 L 5/5  Hip Flex      R 5/5 L 5/5  Knee Flex   R 5/5 L 5/5  Knee Ext    R 5/5 L 5/5  Ankle DF    R 5/5 L 5/5  Ankle PF    R 5/5 L 5/5   Gait: Gait is narrow based and steady. Able to tandem walk easily.   Tone: Normal bulk and tone.    Joints: No joint swelling.   Psychiatric: Mood and affect are appropriate.       Assessment/Plan:  Multiple sclerosis (HCC)  Ms. Torres is a 49-year-old female with a past medical history of BPPV and anxiety, who had an episode of vertigo in 2018, subsequently found to have multiple T2 and flair hyperintensities on brain MRI imaging highly suspicious for MS. Prior left lower extremity numbness several years prior may have been an unrecognized initial attack.  Thoracic spinal cord lesions observed on MRI imaging in addition to brain lesions, thus fulfilling Torres criteria for separation space. CSF positive for unique oligoclonal bands fulfilling 2017 Torres criteria for separation in time.      Ms. Torres is currently clinically stable on Tecfidera with some mild flushing and loose stool symptoms.  No symptoms of MS relapse.  Today we discussed our plan moving forward now that she is up and running on a disease modifying treatment. We will check new baseline MRI imaging in August 2019. She will also have labwork to monitor her lymphocyte counts and LFT's.     We discussed plan to refer to outside provider if [facility] discontinues contract with [facility].     Plan:  1.  Continue Tecfidera 240 mg twice a day.  2. Continue Vitamin D supplement up to 4000 IUs daily  3. MRI's of brain, c-spine, and t-spine w/wo contrast ordered today - to be completed in August 2019.  4. Labs ordered: CBC and CMP. Goal absolute lymphocyte count >500.         Brandi Mayfield D.O., M.P.H  MS specialist.   Board Certified Neurologist.  Neurology Clerkship Director, [employer].   Assistant  Professor Neurology,  [facility].   Tel: [phone]  Fax: [phone]

## 2019-07-16 ENCOUNTER — HOSPITAL ENCOUNTER (OUTPATIENT)
Dept: LAB | Facility: MEDICAL CENTER | Age: 49
End: 2019-07-16
Attending: PSYCHIATRY & NEUROLOGY
Payer: COMMERCIAL

## 2019-07-16 DIAGNOSIS — G35 MULTIPLE SCLEROSIS (HCC): ICD-10-CM

## 2019-07-16 LAB
ALBUMIN SERPL BCP-MCNC: 4.2 G/DL (ref 3.2–4.9)
ALBUMIN/GLOB SERPL: 1.5 G/DL
ALP SERPL-CCNC: 68 U/L (ref 30–99)
ALT SERPL-CCNC: 23 U/L (ref 2–50)
ANION GAP SERPL CALC-SCNC: 10 MMOL/L (ref 0–11.9)
AST SERPL-CCNC: 16 U/L (ref 12–45)
BASOPHILS # BLD AUTO: 0.5 % (ref 0–1.8)
BASOPHILS # BLD: 0.03 K/UL (ref 0–0.12)
BILIRUB SERPL-MCNC: 0.9 MG/DL (ref 0.1–1.5)
BUN SERPL-MCNC: 13 MG/DL (ref 8–22)
CALCIUM SERPL-MCNC: 8.9 MG/DL (ref 8.5–10.5)
CHLORIDE SERPL-SCNC: 104 MMOL/L (ref 96–112)
CO2 SERPL-SCNC: 23 MMOL/L (ref 20–33)
CREAT SERPL-MCNC: 0.82 MG/DL (ref 0.5–1.4)
EOSINOPHIL # BLD AUTO: 0.06 K/UL (ref 0–0.51)
EOSINOPHIL NFR BLD: 1 % (ref 0–6.9)
ERYTHROCYTE [DISTWIDTH] IN BLOOD BY AUTOMATED COUNT: 44.5 FL (ref 35.9–50)
GLOBULIN SER CALC-MCNC: 2.8 G/DL (ref 1.9–3.5)
GLUCOSE SERPL-MCNC: 87 MG/DL (ref 65–99)
HCT VFR BLD AUTO: 46.8 % (ref 37–47)
HGB BLD-MCNC: 15.1 G/DL (ref 12–16)
IMM GRANULOCYTES # BLD AUTO: 0.02 K/UL (ref 0–0.11)
IMM GRANULOCYTES NFR BLD AUTO: 0.3 % (ref 0–0.9)
LYMPHOCYTES # BLD AUTO: 0.7 K/UL (ref 1–4.8)
LYMPHOCYTES NFR BLD: 11.3 % (ref 22–41)
MCH RBC QN AUTO: 29.7 PG (ref 27–33)
MCHC RBC AUTO-ENTMCNC: 32.3 G/DL (ref 33.6–35)
MCV RBC AUTO: 92.1 FL (ref 81.4–97.8)
MONOCYTES # BLD AUTO: 0.61 K/UL (ref 0–0.85)
MONOCYTES NFR BLD AUTO: 9.8 % (ref 0–13.4)
NEUTROPHILS # BLD AUTO: 4.79 K/UL (ref 2–7.15)
NEUTROPHILS NFR BLD: 77.1 % (ref 44–72)
NRBC # BLD AUTO: 0 K/UL
NRBC BLD-RTO: 0 /100 WBC
PLATELET # BLD AUTO: 211 K/UL (ref 164–446)
PMV BLD AUTO: 10.9 FL (ref 9–12.9)
POTASSIUM SERPL-SCNC: 4 MMOL/L (ref 3.6–5.5)
PROT SERPL-MCNC: 7 G/DL (ref 6–8.2)
RBC # BLD AUTO: 5.08 M/UL (ref 4.2–5.4)
SODIUM SERPL-SCNC: 137 MMOL/L (ref 135–145)
WBC # BLD AUTO: 6.2 K/UL (ref 4.8–10.8)

## 2019-07-16 PROCEDURE — 36415 COLL VENOUS BLD VENIPUNCTURE: CPT

## 2019-07-16 PROCEDURE — 80053 COMPREHEN METABOLIC PANEL: CPT

## 2019-07-16 PROCEDURE — 85025 COMPLETE CBC W/AUTO DIFF WBC: CPT

## 2019-07-19 ENCOUNTER — HOSPITAL ENCOUNTER (OUTPATIENT)
Dept: RADIOLOGY | Facility: MEDICAL CENTER | Age: 49
End: 2019-07-19
Attending: PSYCHIATRY & NEUROLOGY
Payer: COMMERCIAL

## 2019-07-19 DIAGNOSIS — G35 MULTIPLE SCLEROSIS (HCC): ICD-10-CM

## 2019-07-19 PROCEDURE — A9585 GADOBUTROL INJECTION: HCPCS | Performed by: PSYCHIATRY & NEUROLOGY

## 2019-07-19 PROCEDURE — 72157 MRI CHEST SPINE W/O & W/DYE: CPT

## 2019-07-19 PROCEDURE — 700117 HCHG RX CONTRAST REV CODE 255: Performed by: PSYCHIATRY & NEUROLOGY

## 2019-07-19 PROCEDURE — 72156 MRI NECK SPINE W/O & W/DYE: CPT

## 2019-07-19 PROCEDURE — 70553 MRI BRAIN STEM W/O & W/DYE: CPT

## 2019-07-19 RX ORDER — GADOBUTROL 604.72 MG/ML
10 INJECTION INTRAVENOUS ONCE
Status: COMPLETED | OUTPATIENT
Start: 2019-07-19 | End: 2019-07-19

## 2019-07-19 RX ADMIN — GADOBUTROL 10 ML: 604.72 INJECTION INTRAVENOUS at 10:30

## 2019-07-22 ENCOUNTER — OFFICE VISIT (OUTPATIENT)
Dept: NEUROLOGY | Facility: MEDICAL CENTER | Age: 49
End: 2019-07-22
Payer: COMMERCIAL

## 2019-07-22 VITALS
BODY MASS INDEX: 37.49 KG/M2 | WEIGHT: 225 LBS | TEMPERATURE: 97.7 F | HEART RATE: 65 BPM | HEIGHT: 65 IN | OXYGEN SATURATION: 95 % | SYSTOLIC BLOOD PRESSURE: 114 MMHG | DIASTOLIC BLOOD PRESSURE: 80 MMHG

## 2019-07-22 DIAGNOSIS — G35 MULTIPLE SCLEROSIS (HCC): ICD-10-CM

## 2019-07-22 PROCEDURE — 99215 OFFICE O/P EST HI 40 MIN: CPT | Performed by: PSYCHIATRY & NEUROLOGY

## 2019-08-01 ENCOUNTER — TELEPHONE (OUTPATIENT)
Dept: NEUROLOGY | Facility: MEDICAL CENTER | Age: 49
End: 2019-08-01

## 2019-08-01 NOTE — TELEPHONE ENCOUNTER
Deann Torres Neurology [facility]   Phone Number: [phone]             [facility],   Due to the [place] drop can I please get a referral for a new neurologist, not with [facility]?     Thank you.          Pt requesting referral to neuro

## 2019-08-01 NOTE — TELEPHONE ENCOUNTER
mycharted pt     [facility] has made an agreement with [facility]. She is now covered.     Dr. Mayfield

## 2019-08-04 NOTE — PROGRESS NOTES
CC: Multiple Sclerosis       HPI:    Deann Torres is a very pleasant 49 y.o. female who presents today in neurologic follow up for multiple sclerosis. The patient is currently being treated with Tecfidera.  She has had some mild flushing side effects from the medication, but otherwise is tolerating it well.  She denies any new symptoms suggestive of MS relapse since her previous visit on July 9, 2019.  She is here to review her recent repeat MRI imaging.    MS History:  Diagnosed: December 2016.  Lumbar puncture:  Yes. 11/27/18 Oligoclonal bands positive, IgG synth 14.4.  First presentation: Vertigo in 2018, possibly had left lower extremity numbness years prior.  Disease modifying treatment:               Current: Tecfidera - full dose started February 2019.              Previous: None.  Former or other neurologist: Dr. Faisal Cox at [place].  Last MRI: Brain, c-spine and t-spine 11/20/18.      ROS:   Constitutional: No fevers or chills.  Eyes: No blurry vision or eye pain.  ENT: No dysphagia or hearing loss.  Respiratory: No cough or shortness of breath.  Cardiovascular: No chest pain or palpitations.  GI: No nausea, vomiting, or diarrhea.  : No urinary incontinence or dysuria.  Musculoskeletal: No joint swelling or arthralgias.  Skin: No skin rashes.  Neuro: No headaches, dizziness, or tremors.  Endocrine: No heat or cold intolerance. No polydipsia or polyuria.  Psych: No depression or anxiety.  Heme/Lymph: No easy bruising or swollen lymph nodes.  All other review of systems were reviewed and were negative.     Past Medical History:   Past Medical History:   Diagnosis Date   • Chickenpox    • Depression with anxiety    • Vitamin D deficiency        Past Surgical History: No past surgical history on file.    Social History:   Social History     Socioeconomic History   • Marital status: Single     Spouse name: Not on file   • Number of children: Not on file   • Years of education: Not on file   • Highest  education level: Not on file   Occupational History   • Not on file   Social Needs   • Financial resource strain: Not on file   • Food insecurity:     Worry: Not on file     Inability: Not on file   • Transportation needs:     Medical: Not on file     Non-medical: Not on file   Tobacco Use   • Smoking status: Never Smoker   • Smokeless tobacco: Never Used   Substance and Sexual Activity   • Alcohol use: Yes     Comment: 2 drinks/week   • Drug use: No   • Sexual activity: Not Currently   Lifestyle   • Physical activity:     Days per week: Not on file     Minutes per session: Not on file   • Stress: Not on file   Relationships   • Social connections:     Talks on phone: Not on file     Gets together: Not on file     Attends Rastafari service: Not on file     Active member of club or organization: Not on file     Attends meetings of clubs or organizations: Not on file     Relationship status: Not on file   • Intimate partner violence:     Fear of current or ex partner: Not on file     Emotionally abused: Not on file     Physically abused: Not on file     Forced sexual activity: Not on file   Other Topics Concern   • Not on file   Social History Narrative   • Not on file       Family Hx:   Family History   Problem Relation Age of Onset   • Cancer Father         Lung Cancer, Prostate Cancer   • Thyroid Brother        Current Medications:   Current Outpatient Medications:   •  sertraline (ZOLOFT) 25 MG tablet, TAKE 1 TABLET BY MOUTH EVERY DAY FOR 2 WEEKS, THEN INCREASE TO 2 TABLETS EVERY DAY, Disp: 180 Tab, Rfl: 1  •  Dimethyl Fumarate (TECFIDERA) 240 MG CAPSULE DELAYED RELEASE, Take 240 mg by mouth 2 Times a Day., Disp: , Rfl:   •  Cholecalciferol (VITAMIN D3) 5000 units Cap, Take 1 Cap by mouth every day., Disp: , Rfl:   •  hydrOXYzine HCl (ATARAX) 25 MG Tab, Take 1/2 to 1 pill up to 3 times daily as needed for anxiety and for insomnia (Patient not taking: Reported on 7/9/2019), Disp: 45 Tab, Rfl: 1    Allergies: No  "Known Allergies      Physical Exam:   Ambulatory Vitals  Encounter Vitals  Temperature: 36.5 °C (97.7 °F)  Temp src: *RETIRED* Temporal  Blood Pressure: 114/80  Pulse: 65  Pulse Oximetry: 95 %  Weight: 102.1 kg (225 lb)  Height: 165.1 cm (5' 5\")  BMI (Calculated): 37.44      Constitutional: Well-developed, well-nourished, good hygiene. Appears stated age.  Cardiovascular: RRR, with no murmurs, rubs or gallops. No carotid bruits. No peripheral edema, pedal pulses intact.   Respiratory: Lungs CTA B/L, no W/R/R.   Skin: Warm, dry, intact. No rashes observed.  Eyes: Sclera anicteric.   Funduscopic: Optic discs flat with no evidence of papilledema or pallor. Normal posterior segments.  Neurologic:   Mental Status: Awake, alert, oriented x 3.   Speech: Fluent with normal prosody.   Memory: Able to recall recent and remote events accurately.    Concentration: Attentive. Able to focus on history and follow multi-step commands.   Fund of Knowledge: Appropriate.   Cranial Nerves:    CN II: PERRL. No afferent pupillary defect.    CN III, IV, VI: Good eye closure, EOMI.     CN V: Facial sensation intact and symmetric.     CN VII: No facial asymmetry.     CN VIII: Hearing intact.     CN IX and X: Palate elevates symmetrically. Normal gag reflex.    CN XI: Symmetric shoulder shrug.     CN XII: Tongue midline.    Sensory: Intact light touch, vibration and temperature.    Coordination: No evidence of past-pointing on finger to nose testing, no dysdiadochokinesia. Heel to shin intact.    DTR's: 2+ throughout without clonus.    Babinski: Toes downgoing bilaterally.   Romberg: Negative.   Movements: No tremors observed.   Musculoskeletal:    Strength: 5/5 in upper and lower extremities bilaterally.   Gait: Steady, narrow based.    Tone: Normal bulk and tone.   Joints: No swelling.     Labs Reviewed:  Results for JOSHUA MICHEL (MRN [id number]) as of 8/4/2019 13:22   Ref. Range 7/16/2019 11:13   WBC Latest Ref Range: 4.8 - 10.8 K/uL " 6.2   RBC Latest Ref Range: 4.20 - 5.40 M/uL 5.08   Hemoglobin Latest Ref Range: 12.0 - 16.0 g/dL 15.1   Hematocrit Latest Ref Range: 37.0 - 47.0 % 46.8   MCV Latest Ref Range: 81.4 - 97.8 fL 92.1   MCH Latest Ref Range: 27.0 - 33.0 pg 29.7   MCHC Latest Ref Range: 33.6 - 35.0 g/dL 32.3 (L)   RDW Latest Ref Range: 35.9 - 50.0 fL 44.5   Platelet Count Latest Ref Range: 164 - 446 K/uL 211   MPV Latest Ref Range: 9.0 - 12.9 fL 10.9   Neutrophils-Polys Latest Ref Range: 44.00 - 72.00 % 77.10 (H)   Neutrophils (Absolute) Latest Ref Range: 2.00 - 7.15 K/uL 4.79   Lymphocytes Latest Ref Range: 22.00 - 41.00 % 11.30 (L)   Lymphs (Absolute) Latest Ref Range: 1.00 - 4.80 K/uL 0.70 (L)   Monocytes Latest Ref Range: 0.00 - 13.40 % 9.80   Monos (Absolute) Latest Ref Range: 0.00 - 0.85 K/uL 0.61   Eosinophils Latest Ref Range: 0.00 - 6.90 % 1.00   Eos (Absolute) Latest Ref Range: 0.00 - 0.51 K/uL 0.06   Basophils Latest Ref Range: 0.00 - 1.80 % 0.50   Baso (Absolute) Latest Ref Range: 0.00 - 0.12 K/uL 0.03   Immature Granulocytes Latest Ref Range: 0.00 - 0.90 % 0.30   Immature Granulocytes (abs) Latest Ref Range: 0.00 - 0.11 K/uL 0.02   Nucleated RBC Latest Units: /100 WBC 0.00   NRBC (Absolute) Latest Units: K/uL 0.00   Sodium Latest Ref Range: 135 - 145 mmol/L 137   Potassium Latest Ref Range: 3.6 - 5.5 mmol/L 4.0   Chloride Latest Ref Range: 96 - 112 mmol/L 104   Co2 Latest Ref Range: 20 - 33 mmol/L 23   Anion Gap Latest Ref Range: 0.0 - 11.9  10.0   Glucose Latest Ref Range: 65 - 99 mg/dL 87   Bun Latest Ref Range: 8 - 22 mg/dL 13   Creatinine Latest Ref Range: 0.50 - 1.40 mg/dL 0.82   GFR If  Latest Ref Range: >60 mL/min/1.73 m 2 >60   GFR If Non  Latest Ref Range: >60 mL/min/1.73 m 2 >60   Calcium Latest Ref Range: 8.5 - 10.5 mg/dL 8.9   AST(SGOT) Latest Ref Range: 12 - 45 U/L 16   ALT(SGPT) Latest Ref Range: 2 - 50 U/L 23   Alkaline Phosphatase Latest Ref Range: 30 - 99 U/L 68   Total  Bilirubin Latest Ref Range: 0.1 - 1.5 mg/dL 0.9   Albumin Latest Ref Range: 3.2 - 4.9 g/dL 4.2   Total Protein Latest Ref Range: 6.0 - 8.2 g/dL 7.0   Globulin Latest Ref Range: 1.9 - 3.5 g/dL 2.8   A-G Ratio Latest Units: g/dL 1.5       Imaging reviewed:   Today I reviewed the patient's most recent MRI images with her in the examination room. I explained basic terminology of MRI's, verbalized my assessment, and answered her questions.     MRI Brain w/wo contrast from July 19, 2019.       Multiple scattered T2 hyperintense lesions in the periventricular and juxtacortical cerebral white matter are nonspecific, most in keeping with the history of demyelinating disease. No enhancing lesion is seen to suggest active demyelination.       MRI C-Spine w/wo contrast from July 19, 2019.  1.  Stable T2 hyperintense lesions in the cervical and upper thoracic cord keeping with history of demyelinating disease. No new or enhancing lesions seen.  2.  Stable C5-C7 disc degeneration.    MRI T-spine with and without contrast from July 19, 2019.       1.  Stable small demyelinating lesion in the upper thoracic cord at T1-T2. No enhancing lesion is seen to suggest active demyelination.  2.  Mild thoracic spondylosis, without significant spinal or foraminal stenosis.         Assessment/Plan:  Multiple sclerosis (HCC)  Ms. Moi Torres is a 49-year-old woman with a past medical history of BPPV and anxiety, who was diagnosed with relapsing remitting multiple sclerosis after an episode of vertigo in 2018 prompted MRI imaging which incidentally showed multiple T2 and flair hyperintensities on brain MRI suspicious for multiple sclerosis.  She was additionally found to have a lesion in the thoracic spine at T1 to.  She likely had initial demyelinating attack several years prior, which went unrecognized.  Spinal fluid was positive for unique oligoclonal bands.  She initiated treatment with Tecfidera in February 2019.  She recently had her MRI  imaging repeated for new baseline.  We reviewed her images together today, and there do not appear to be additional lesions since her previous studies.  I recommended she continue with Tecfidera.  Her absolute lymphocyte count was 0.7 on July 19th, 2019.  I recommended this be repeated no later than 6 months from now with a goal greater than 0.5.    Plan:  1.  Continue Tecfidera to 40 mg twice a day.  2.  MRI imaging July 2020 sooner if any new symptoms.   3.  Next CBC January 2020.        Greater than 50% of this 40 minute face to face follow up encounter was devoted to disease state counseling on Multiple Sclerosis and coordination of care. (see above assessment and plan for further details of discussion).  Additional time was spent reviewing lab studies and MRI over the review with the patient in the exam room.  This note was completed using dictation software resulting in occasional speech recognition errors.      Brandi Mayfield D.O., M.P.H  MS specialist.   Board Certified Neurologist.  Neurology Clerkship Director, [facility].    Neurology,  [facility].   Tel: [phone]  Fax: [phone]

## 2019-09-24 ENCOUNTER — HOSPITAL ENCOUNTER (OUTPATIENT)
Dept: RADIOLOGY | Facility: MEDICAL CENTER | Age: 49
End: 2019-09-24
Attending: NURSE PRACTITIONER
Payer: COMMERCIAL

## 2019-09-24 DIAGNOSIS — Z12.31 VISIT FOR SCREENING MAMMOGRAM: ICD-10-CM

## 2019-09-24 PROCEDURE — 77067 SCR MAMMO BI INCL CAD: CPT

## 2020-01-22 ENCOUNTER — OFFICE VISIT (OUTPATIENT)
Dept: NEUROLOGY | Facility: MEDICAL CENTER | Age: 50
End: 2020-01-22
Payer: COMMERCIAL

## 2020-01-22 VITALS
HEART RATE: 70 BPM | WEIGHT: 228 LBS | HEIGHT: 65 IN | TEMPERATURE: 99.1 F | SYSTOLIC BLOOD PRESSURE: 116 MMHG | DIASTOLIC BLOOD PRESSURE: 78 MMHG | OXYGEN SATURATION: 98 % | BODY MASS INDEX: 37.99 KG/M2

## 2020-01-22 DIAGNOSIS — G35 MS (MULTIPLE SCLEROSIS) (HCC): ICD-10-CM

## 2020-01-22 DIAGNOSIS — G35 MULTIPLE SCLEROSIS (HCC): ICD-10-CM

## 2020-01-22 PROCEDURE — 99214 OFFICE O/P EST MOD 30 MIN: CPT | Performed by: PSYCHIATRY & NEUROLOGY

## 2020-01-22 RX ORDER — DIMETHYL FUMARATE 240 MG/1
240 CAPSULE ORAL 2 TIMES DAILY
Qty: 60 CAP | Refills: 11 | Status: SHIPPED
Start: 2020-01-22 | End: 2020-12-08

## 2020-01-23 NOTE — PROGRESS NOTES
CC: Multiple Sclerosis       HPI:    Deann Torres is a very pleasant 49 y.o. female who presents today in neurologic follow up for multiple sclerosis. The patient is currently being treated with Tecfidera.  She has had some mild flushing side effects from the medication, but otherwise is tolerating it well.  She denies any new symptoms suggestive of MS relapse since her previous visit on July 9, 2019.  She is here to review her recent repeat MRI imaging.    MS History:  Diagnosed: December 2016.  Lumbar puncture:  Yes. 11/27/18 Oligoclonal bands positive, IgG synth 14.4.  First presentation: Vertigo in 2018, possibly had left lower extremity numbness years prior.  Disease modifying treatment:               Current: Tecfidera - full dose started February 2019.              Previous: None.  Former or other neurologist: Dr. Faisal Cox at [facility].  Last MRI: Brain, c-spine and t-spine 11/20/18.      ROS:   Constitutional: No fevers or chills.  Eyes: No blurry vision or eye pain.  ENT: No dysphagia or hearing loss.  Respiratory: No cough or shortness of breath.  Cardiovascular: No chest pain or palpitations.  GI: No nausea, vomiting, or diarrhea.  : No urinary incontinence or dysuria.  Musculoskeletal: No joint swelling or arthralgias.  Skin: No skin rashes.  Neuro: No headaches, dizziness, or tremors.  Endocrine: No heat or cold intolerance. No polydipsia or polyuria.  Psych: No depression or anxiety.  Heme/Lymph: No easy bruising or swollen lymph nodes.  All other review of systems were reviewed and were negative.     Past Medical History:   Past Medical History:   Diagnosis Date   • Chickenpox    • Depression with anxiety    • Vitamin D deficiency        Past Surgical History: History reviewed. No pertinent surgical history.    Social History:   Social History     Socioeconomic History   • Marital status: Single     Spouse name: Not on file   • Number of children: Not on file   • Years of education: Not on  file   • Highest education level: Not on file   Occupational History   • Not on file   Social Needs   • Financial resource strain: Not on file   • Food insecurity:     Worry: Not on file     Inability: Not on file   • Transportation needs:     Medical: Not on file     Non-medical: Not on file   Tobacco Use   • Smoking status: Never Smoker   • Smokeless tobacco: Never Used   Substance and Sexual Activity   • Alcohol use: Yes     Comment: 2 drinks/week   • Drug use: No   • Sexual activity: Not Currently   Lifestyle   • Physical activity:     Days per week: Not on file     Minutes per session: Not on file   • Stress: Not on file   Relationships   • Social connections:     Talks on phone: Not on file     Gets together: Not on file     Attends Religion service: Not on file     Active member of club or organization: Not on file     Attends meetings of clubs or organizations: Not on file     Relationship status: Not on file   • Intimate partner violence:     Fear of current or ex partner: Not on file     Emotionally abused: Not on file     Physically abused: Not on file     Forced sexual activity: Not on file   Other Topics Concern   • Not on file   Social History Narrative   • Not on file       Family Hx:   Family History   Problem Relation Age of Onset   • Cancer Father         Lung Cancer, Prostate Cancer   • Thyroid Brother        Current Medications:   Current Outpatient Medications:   •  Dimethyl Fumarate (TECFIDERA) 240 MG CAPSULE DELAYED RELEASE, Take 240 mg by mouth 2 Times a Day., Disp: 60 Cap, Rfl: 11  •  sertraline (ZOLOFT) 25 MG tablet, TAKE 1 TABLET BY MOUTH EVERY DAY FOR 2 WEEKS, THEN INCREASE TO 2 TABLETS EVERY DAY, Disp: 180 Tab, Rfl: 1  •  Cholecalciferol (VITAMIN D3) 5000 units Cap, Take 1 Cap by mouth every day., Disp: , Rfl:   •  hydrOXYzine HCl (ATARAX) 25 MG Tab, Take 1/2 to 1 pill up to 3 times daily as needed for anxiety and for insomnia (Patient not taking: Reported on 7/9/2019), Disp: 45 Tab,  "Rfl: 1    Allergies: No Known Allergies      Physical Exam:   Ambulatory Vitals  Encounter Vitals  Temperature: 36.5 °C (97.7 °F)  Temp src: *RETIRED* Temporal  Blood Pressure: 114/80  Pulse: 65  Pulse Oximetry: 95 %  Weight: 102.1 kg (225 lb)  Height: 165.1 cm (5' 5\")  BMI (Calculated): 37.44      Constitutional: Well-developed, well-nourished, good hygiene. Appears stated age.  Cardiovascular: RRR, with no murmurs, rubs or gallops. No carotid bruits. No peripheral edema, pedal pulses intact.   Respiratory: Lungs CTA B/L, no W/R/R.   Skin: Warm, dry, intact. No rashes observed.  Eyes: Sclera anicteric.   Funduscopic: Optic discs flat with no evidence of papilledema or pallor. Normal posterior segments.  Neurologic:   Mental Status: Awake, alert, oriented x 3.   Speech: Fluent with normal prosody.   Memory: Able to recall recent and remote events accurately.    Concentration: Attentive. Able to focus on history and follow multi-step commands.   Fund of Knowledge: Appropriate.   Cranial Nerves:    CN II: PERRL. No afferent pupillary defect.    CN III, IV, VI: Good eye closure, EOMI.     CN V: Facial sensation intact and symmetric.     CN VII: No facial asymmetry.     CN VIII: Hearing intact.     CN IX and X: Palate elevates symmetrically. Normal gag reflex.    CN XI: Symmetric shoulder shrug.     CN XII: Tongue midline.    Sensory: Intact light touch, vibration and temperature.    Coordination: No evidence of past-pointing on finger to nose testing, no dysdiadochokinesia. Heel to shin intact.    DTR's: 2+ throughout without clonus.    Babinski: Toes downgoing bilaterally.   Romberg: Negative.   Movements: No tremors observed.   Musculoskeletal:    Strength: 5/5 in upper and lower extremities bilaterally.   Gait: Steady, narrow based.    Tone: Normal bulk and tone.   Joints: No swelling.     Labs Reviewed:  Results for JOSHUA MICHEL (MRN [id number]) as of 8/4/2019 13:22   Ref. Range 7/16/2019 11:13   WBC Latest Ref " Range: 4.8 - 10.8 K/uL 6.2   RBC Latest Ref Range: 4.20 - 5.40 M/uL 5.08   Hemoglobin Latest Ref Range: 12.0 - 16.0 g/dL 15.1   Hematocrit Latest Ref Range: 37.0 - 47.0 % 46.8   MCV Latest Ref Range: 81.4 - 97.8 fL 92.1   MCH Latest Ref Range: 27.0 - 33.0 pg 29.7   MCHC Latest Ref Range: 33.6 - 35.0 g/dL 32.3 (L)   RDW Latest Ref Range: 35.9 - 50.0 fL 44.5   Platelet Count Latest Ref Range: 164 - 446 K/uL 211   MPV Latest Ref Range: 9.0 - 12.9 fL 10.9   Neutrophils-Polys Latest Ref Range: 44.00 - 72.00 % 77.10 (H)   Neutrophils (Absolute) Latest Ref Range: 2.00 - 7.15 K/uL 4.79   Lymphocytes Latest Ref Range: 22.00 - 41.00 % 11.30 (L)   Lymphs (Absolute) Latest Ref Range: 1.00 - 4.80 K/uL 0.70 (L)   Monocytes Latest Ref Range: 0.00 - 13.40 % 9.80   Monos (Absolute) Latest Ref Range: 0.00 - 0.85 K/uL 0.61   Eosinophils Latest Ref Range: 0.00 - 6.90 % 1.00   Eos (Absolute) Latest Ref Range: 0.00 - 0.51 K/uL 0.06   Basophils Latest Ref Range: 0.00 - 1.80 % 0.50   Baso (Absolute) Latest Ref Range: 0.00 - 0.12 K/uL 0.03   Immature Granulocytes Latest Ref Range: 0.00 - 0.90 % 0.30   Immature Granulocytes (abs) Latest Ref Range: 0.00 - 0.11 K/uL 0.02   Nucleated RBC Latest Units: /100 WBC 0.00   NRBC (Absolute) Latest Units: K/uL 0.00   Sodium Latest Ref Range: 135 - 145 mmol/L 137   Potassium Latest Ref Range: 3.6 - 5.5 mmol/L 4.0   Chloride Latest Ref Range: 96 - 112 mmol/L 104   Co2 Latest Ref Range: 20 - 33 mmol/L 23   Anion Gap Latest Ref Range: 0.0 - 11.9  10.0   Glucose Latest Ref Range: 65 - 99 mg/dL 87   Bun Latest Ref Range: 8 - 22 mg/dL 13   Creatinine Latest Ref Range: 0.50 - 1.40 mg/dL 0.82   GFR If  Latest Ref Range: >60 mL/min/1.73 m 2 >60   GFR If Non  Latest Ref Range: >60 mL/min/1.73 m 2 >60   Calcium Latest Ref Range: 8.5 - 10.5 mg/dL 8.9   AST(SGOT) Latest Ref Range: 12 - 45 U/L 16   ALT(SGPT) Latest Ref Range: 2 - 50 U/L 23   Alkaline Phosphatase Latest Ref Range: 30 -  99 U/L 68   Total Bilirubin Latest Ref Range: 0.1 - 1.5 mg/dL 0.9   Albumin Latest Ref Range: 3.2 - 4.9 g/dL 4.2   Total Protein Latest Ref Range: 6.0 - 8.2 g/dL 7.0   Globulin Latest Ref Range: 1.9 - 3.5 g/dL 2.8   A-G Ratio Latest Units: g/dL 1.5       Imaging reviewed:   Today I reviewed the patient's most recent MRI images with her in the examination room. I explained basic terminology of MRI's, verbalized my assessment, and answered her questions.     MRI Brain w/wo contrast from July 19, 2019.       Multiple scattered T2 hyperintense lesions in the periventricular and juxtacortical cerebral white matter are nonspecific, most in keeping with the history of demyelinating disease. No enhancing lesion is seen to suggest active demyelination.       MRI C-Spine w/wo contrast from July 19, 2019.  1.  Stable T2 hyperintense lesions in the cervical and upper thoracic cord keeping with history of demyelinating disease. No new or enhancing lesions seen.  2.  Stable C5-C7 disc degeneration.    MRI T-spine with and without contrast from July 19, 2019.       1.  Stable small demyelinating lesion in the upper thoracic cord at T1-T2. No enhancing lesion is seen to suggest active demyelination.  2.  Mild thoracic spondylosis, without significant spinal or foraminal stenosis.         Assessment/Plan:  Multiple sclerosis (HCC)  Patient has been doing well with her MS symptoms on Tecfidera.  Currently she just has some fatigue.  Patient states that she has a change of insurance and needs to get the Tecfidera changed to a different specialty pharmacy but she is not sure which specialty pharmacy.  Patient has not had recent laboratory testing.  Plan to order CBC and CMP.  We will check out new pharmacy as [facility] and send new prescription for Tecfidera there    Patient return to clinic for follow-up in 6 months      Greater than 50% of this 40 minute face to face follow up encounter was devoted to disease state counseling on  Multiple Sclerosis and coordination of care. (see above assessment and plan for further details of discussion).  Additional time was spent reviewing lab studies and MRI over the review with the patient in the exam room.  This note was completed using dictation software resulting in occasional speech recognition errors.      Melissa please P. Bloch, MD  MS specialist.   Board Certified Neurologist.   Neurology,  [facility].   Tel: [phone]  Fax: [phone]

## 2020-01-23 NOTE — ASSESSMENT & PLAN NOTE
Patient has been doing well with her MS symptoms on Tecfidera.  Currently she just has some fatigue.  Patient states that she has a change of insurance and needs to get the Tecfidera changed to a different specialty pharmacy but she is not sure which specialty pharmacy.  Patient has not had recent laboratory testing.  Plan to order CBC and CMP.  We will check out new pharmacy as [facility] and send new prescription for Tecfidera there

## 2020-05-14 DIAGNOSIS — F41.9 ANXIETY: ICD-10-CM

## 2020-05-14 DIAGNOSIS — F43.9 STRESS: ICD-10-CM

## 2020-05-14 RX ORDER — SERTRALINE HYDROCHLORIDE 25 MG/1
50 TABLET, FILM COATED ORAL DAILY
Qty: 180 TAB | Refills: 0 | Status: SHIPPED | OUTPATIENT
Start: 2020-05-14 | End: 2020-08-18

## 2020-05-14 NOTE — TELEPHONE ENCOUNTER
Was the patient seen in the last year in this department? No    Does patient have an active prescription for medications requested? No     Received Request Via: Pharmacy    Pt met protocol?: No    Last OV 09/06/2018

## 2020-07-20 ENCOUNTER — TELEPHONE (OUTPATIENT)
Dept: NEUROLOGY | Facility: MEDICAL CENTER | Age: 50
End: 2020-07-20

## 2020-07-20 NOTE — TELEPHONE ENCOUNTER
[facility]    Phone Number: [phone]               Hi Dr. Bloch,   I was in your office on Jan 22. I was trying to schedule my yearly MRIs but they do not have an order for them, just my labs.     I have scheduled my labs for the 31 of July.     Thank you.     -Deann Torres   [phone]       Sent via [employer]

## 2020-07-21 NOTE — TELEPHONE ENCOUNTER
Called [facility] for pt to call back to schedule appt, also sent Anke message to schedule her appt with Missy

## 2020-07-24 ENCOUNTER — OFFICE VISIT (OUTPATIENT)
Dept: NEUROLOGY | Facility: MEDICAL CENTER | Age: 50
End: 2020-07-24
Payer: COMMERCIAL

## 2020-07-24 VITALS
BODY MASS INDEX: 40.04 KG/M2 | WEIGHT: 240.3 LBS | HEIGHT: 65 IN | DIASTOLIC BLOOD PRESSURE: 82 MMHG | OXYGEN SATURATION: 98 % | HEART RATE: 88 BPM | SYSTOLIC BLOOD PRESSURE: 130 MMHG | TEMPERATURE: 98.2 F | RESPIRATION RATE: 16 BRPM

## 2020-07-24 DIAGNOSIS — F41.9 ANXIETY: ICD-10-CM

## 2020-07-24 DIAGNOSIS — G35 MULTIPLE SCLEROSIS (HCC): ICD-10-CM

## 2020-07-24 PROCEDURE — 99214 OFFICE O/P EST MOD 30 MIN: CPT | Performed by: REGISTERED NURSE

## 2020-07-24 ASSESSMENT — PATIENT HEALTH QUESTIONNAIRE - PHQ9: CLINICAL INTERPRETATION OF PHQ2 SCORE: 0

## 2020-07-24 ASSESSMENT — FIBROSIS 4 INDEX: FIB4 SCORE: 0.76

## 2020-07-24 NOTE — ASSESSMENT & PLAN NOTE
Diagnosed: 2018 had symptoms of drop foot in 2016 relieved by a chiropractor (so patient had symptoms of MS 2 years prior to actual diagnosis).  Presenting Symptoms:  Vertigo   Treatments: tecfidera

## 2020-07-24 NOTE — PROGRESS NOTES
Chief Complaint   Patient presents with   • Follow-Up     MS       Problem List Items Addressed This Visit     Anxiety     On zoloft with good results.           Multiple sclerosis (HCC)     Diagnosed: 2018 had symptoms of drop foot in 2016 relieved by a chiropractor (so patient had symptoms of MS 2 years prior to actual diagnosis).  Presenting Symptoms:  Vertigo   Treatments: tecfidera         Relevant Orders    VITAMIN D,25 HYDROXY    VITAMIN B12    MR-BRAIN-WITH & W/O    MR-THORACIC SPINE-WITH & W/O          History of present illness:  Deann Torres 50 y.o. female presents today for follow up MS.  Diagnosed in 2018 and has been doing well on tecfidera.  No new symptoms expressed today.     MS HISTORY  First Diagnosed: 2018  Presenting Symptoms:  Vertigo and two years earlier left foot drop  Disease Modifying Agents in past:  Tecfidera    Current Disease Modifying Agent:  Tecfidera        Prior brain imaging:  Yes  IMPRESSION:   07/19/2019  Multiple scattered T2 hyperintense lesions in the periventricular and juxtacortical cerebral white matter are nonspecific, most in keeping with the history of demyelinating disease. No enhancing lesion is seen to suggest active demyelination.    IMPRESSION:Cervical 07/19/2019     1.  Stable T2 hyperintense lesions in the cervical and upper thoracic cord keeping with history of demyelinating disease. No new or enhancing lesions seen.  2.  Stable C5-C7 disc degeneration.    IMPRESSION: Thoracic 07/19/2019     1.  Stable small demyelinating lesion in the upper thoracic cord at T1-T2. No enhancing lesion is seen to suggest active demyelination.  2.  Mild thoracic spondylosis, without significant spinal or foraminal stenosis    Currently employed: Yes  Works in [employer]    Past medical history:   Past Medical History:   Diagnosis Date   • Chickenpox    • Depression with anxiety    • Vitamin D deficiency        Past surgical history:   History reviewed. No  pertinent surgical history.    Family history:   Family History   Problem Relation Age of Onset   • Cancer Father         Lung Cancer, Prostate Cancer   • Thyroid Brother        Social history: Single no children  Social History     Socioeconomic History   • Marital status: Single     Spouse name: Not on file   • Number of children: Not on file   • Years of education: Not on file   • Highest education level: Not on file   Occupational History   • Not on file   Social Needs   • Financial resource strain: Not on file   • Food insecurity     Worry: Not on file     Inability: Not on file   • Transportation needs     Medical: Not on file     Non-medical: Not on file   Tobacco Use   • Smoking status: Never Smoker   • Smokeless tobacco: Never Used   Substance and Sexual Activity   • Alcohol use: Yes     Comment: 2 drinks/week   • Drug use: No   • Sexual activity: Not Currently   Lifestyle   • Physical activity     Days per week: Not on file     Minutes per session: Not on file   • Stress: Not on file   Relationships   • Social connections     Talks on phone: Not on file     Gets together: Not on file     Attends Orthodoxy service: Not on file     Active member of club or organization: Not on file     Attends meetings of clubs or organizations: Not on file     Relationship status: Not on file   • Intimate partner violence     Fear of current or ex partner: Not on file     Emotionally abused: Not on file     Physically abused: Not on file     Forced sexual activity: Not on file   Other Topics Concern   • Not on file   Social History Narrative   • Not on file       Current medications:   Current Outpatient Medications   Medication   • sertraline (ZOLOFT) 25 MG tablet   • Dimethyl Fumarate (TECFIDERA) 240 MG CAPSULE DELAYED RELEASE   • Cholecalciferol (VITAMIN D3) 5000 units Cap   • hydrOXYzine HCl (ATARAX) 25 MG Tab     No current facility-administered medications for this visit.        Medication Allergy:  No Known  "Allergies    Review of systems:   ROS:   Constitutional: No fevers or chills.  Eyes: No blurry vision or eye pain.  ENT: No dysphagia or hearing loss.  Respiratory: No cough or shortness of breath.  Cardiovascular: No chest pain or palpitations.  GI: No nausea, vomiting, or diarrhea.  : No urinary incontinence or dysuria.  Musculoskeletal: No joint swelling or arthralgias.  Skin: No skin rashes.  Neuro: No headaches, dizziness, or tremors.  Endocrine: No heat or cold intolerance. No polydipsia or polyuria.  Psych: depression/anxiety tx with zoloft  Heme/Lymph: No easy bruising or swollen lymph nodes.  All other review of systems were reviewed and were negative  OTHER:  Lost father two tears ago, mother 81 still working had a brain tumor that has been treated with cryotherapy.    Physical examination:   Vitals:    07/24/20 0924   BP: 130/82   BP Location: Left arm   Patient Position: Sitting   Pulse: 88   Resp: 16   Temp: 36.8 °C (98.2 °F)   TempSrc: Temporal   SpO2: 98%   Weight: 109 kg (240 lb 4.8 oz)   Height: 1.651 m (5' 5\")     General: Patient in no acute distress, pleasant and cooperative.  HEENT: Normocephalic, no signs of acute trauma.   Neck: supple, no meningeal signs or carotid bruits. There is normal range of motion. No tenderness on exam.   Chest: clear to auscultation. No cough.   CV: RRR, no murmurs.   Abdomen: soft, non distended or tender.   Skin: no signs of acute rashes or trauma.   Musculoskeletal: joints exhibit full range of motion, without any pain to palpation. There are no signs of joint or muscle swelling. There is no tenderness to deep palpation of muscles.   Psychiatric: No hallucinatory behavior. Denies symptoms of depression or suicidal ideation. Mood and affect appear normal on exam.     NEUROLOGY EXAM  Eyes: Sclera anicteric.  Neurologic:              Mental Status: Awake, alert, oriented x 3.              Speech: Fluent with normal prosody.              Memory: Able to recall " recent and remote events accurately.               Concentration: Attentive. Able to focus on history and follow multi-step commands.              Fund of Knowledge: Appropriate.              Cranial Nerves:                          CN II: PERRL. No afferent pupillary defect.                          CN III, IV, VI: Good eye closure, EOMI.                           CN V: Facial sensation intact and symmetric.                           CN VII: No facial asymmetry.                           CN VIII: Hearing intact.                           CN IX and X: Palate elevates symmetrically. Normal gag reflex.                          CN XI: Symmetric shoulder shrug.                           CN XII: Tongue midline.               Sensory:               Coordination: No evidence of past-pointing on finger to nose testing, no dysdiadochokinesia. Heel to shin intact.               DTR's: 2+ throughout without clonus.               Babinski: Toes down going bilaterally.              Romberg: Negative.              Movements: No tremors observed.   Musculoskeletal:               Strength: 5/5 in upper extremities, r/r to right RL and 4/5 to left LE              Gait: Steady, narrow based.               Tone: Normal bulk and tone.              Joints: No swelling.    ANCILLARY DATA REVIEWED:      Lab Data Review:  Results for JOSHUA MICHEL (MRN [id number]) as of 7/24/2020 10:19   Ref. Range 7/16/2019 11:13   WBC Latest Ref Range: 4.8 - 10.8 K/uL 6.2   RBC Latest Ref Range: 4.20 - 5.40 M/uL 5.08   Hemoglobin Latest Ref Range: 12.0 - 16.0 g/dL 15.1   Hematocrit Latest Ref Range: 37.0 - 47.0 % 46.8   MCV Latest Ref Range: 81.4 - 97.8 fL 92.1   MCH Latest Ref Range: 27.0 - 33.0 pg 29.7   MCHC Latest Ref Range: 33.6 - 35.0 g/dL 32.3 (L)   RDW Latest Ref Range: 35.9 - 50.0 fL 44.5   Platelet Count Latest Ref Range: 164 - 446 K/uL 211   MPV Latest Ref Range: 9.0 - 12.9 fL 10.9   Neutrophils-Polys Latest Ref Range: 44.00 - 72.00 % 77.10  (H)   Neutrophils (Absolute) Latest Ref Range: 2.00 - 7.15 K/uL 4.79   Lymphocytes Latest Ref Range: 22.00 - 41.00 % 11.30 (L)   Lymphs (Absolute) Latest Ref Range: 1.00 - 4.80 K/uL 0.70 (L)   Monocytes Latest Ref Range: 0.00 - 13.40 % 9.80   Monos (Absolute) Latest Ref Range: 0.00 - 0.85 K/uL 0.61   Eosinophils Latest Ref Range: 0.00 - 6.90 % 1.00   Eos (Absolute) Latest Ref Range: 0.00 - 0.51 K/uL 0.06   Basophils Latest Ref Range: 0.00 - 1.80 % 0.50   Baso (Absolute) Latest Ref Range: 0.00 - 0.12 K/uL 0.03   Immature Granulocytes Latest Ref Range: 0.00 - 0.90 % 0.30   Immature Granulocytes (abs) Latest Ref Range: 0.00 - 0.11 K/uL 0.02   Nucleated RBC Latest Units: /100 WBC 0.00   NRBC (Absolute) Latest Units: K/uL 0.00     Results for JOSHUA MICHEL (MRN [id number]) as of 7/24/2020 10:19   Ref. Range 7/16/2019 11:13   Sodium Latest Ref Range: 135 - 145 mmol/L 137   Potassium Latest Ref Range: 3.6 - 5.5 mmol/L 4.0   Chloride Latest Ref Range: 96 - 112 mmol/L 104   Co2 Latest Ref Range: 20 - 33 mmol/L 23   Anion Gap Latest Ref Range: 0.0 - 11.9  10.0   Glucose Latest Ref Range: 65 - 99 mg/dL 87   Bun Latest Ref Range: 8 - 22 mg/dL 13   Creatinine Latest Ref Range: 0.50 - 1.40 mg/dL 0.82   GFR If  Latest Ref Range: >60 mL/min/1.73 m 2 >60   GFR If Non  Latest Ref Range: >60 mL/min/1.73 m 2 >60   Calcium Latest Ref Range: 8.5 - 10.5 mg/dL 8.9   AST(SGOT) Latest Ref Range: 12 - 45 U/L 16   ALT(SGPT) Latest Ref Range: 2 - 50 U/L 23   Alkaline Phosphatase Latest Ref Range: 30 - 99 U/L 68   Total Bilirubin Latest Ref Range: 0.1 - 1.5 mg/dL 0.9   Albumin Latest Ref Range: 3.2 - 4.9 g/dL 4.2   Total Protein Latest Ref Range: 6.0 - 8.2 g/dL 7.0   Globulin Latest Ref Range: 1.9 - 3.5 g/dL 2.8   A-G Ratio Latest Units: g/dL 1.5   Results for JOSHUA MICHEL (MRN [id number]) as of 7/24/2020 10:19   Ref. Range 11/27/2018 13:00   Immunoglobulin G Latest Ref Range: 768 - 1632 mg/dL 1030   B.  Burgdorferi IgG WB Latest Ref Range: Negative  Negative   B. burgdorferi IgM Latest Ref Range: Negative  Negative   Syphilis, Treponemal Qual Latest Ref Range: Non Reactive  Non Reactive   SSA 52 (R0)(RAULITO) Ab, IgG Latest Ref Range: 0 - 40 AU/mL 3   SSA 60 (R0)(RAULITO) Ab, IgG Latest Ref Range: 0 - 40 AU/mL 1   Sjogren'S Anti-Ss-B Latest Ref Range: 0 - 40 AU/mL 0   CSF Albumin Latest Ref Range: 0 - 35 mg/dL 12   Serum Albumin Latest Ref Range: 3500 - 5200 mg/dL 4090   Albumin Ratio Latest Ref Range: 0.09 - 0.25 ratio 0.42 (H)   Albumin Index Latest Ref Range: 0.0 - 9.0 ratio 2.9   IgG Ratio Latest Ref Range: 0.28 - 0.66 ratio 1.69 (H)   Cns IgG Syn Latest Ref Range: <=8.0 mg/d 14.4 (H)   Oligoclonal Bands CSF Latest Ref Range: 0 - 1 Bands 14 (H)   Oligoclonal Bands CSF Latest Ref Range: Negative  Positive (A)   Interpretation Unknown See Note     Imaging Reviewed:  Yes and new MRI's re-ordered    ASSESSMENT AND PLAN:  Deann Torres is a 50 year old female with a hx of MS.  Doing well on tecfidera.  No new symptoms today.  Here for routine follow-up.  Blood work and MRI's ordered.       1. Multiple sclerosis (HCC)    - VITAMIN D,25 HYDROXY; Standing  - VITAMIN B12; Future  - VITAMIN D,25 HYDROXY  - MR-BRAIN-WITH & W/O; Future  - MR-THORACIC SPINE-WITH & W/O; Future    2. Anxiety  Controlled with current zoloft prescription.        FOLLOW-UP:   Six months and prn.  Will call with MRI results    EDUCATION AND COUNSELING:  The patient was educated on diagnosis and prognosis. I re-interated that MS is a chronic progressive disorder for which there is currently no cure. Despite best efforts in management, the condition is likely to progress and carries significant risk for disability including physical and cognitive.     Effectiveness, indications,side effects, and safety profile of available Disease Modifying Agents (DMA’s) reviewed.     Discussed importance of regular exercise program.  I stressed the importance of  sleep hygiene. Vitamin E ingestion as last value was low.      The patient will require frequent follow up including:  Laboratory monitoring -Imaging of entire neuro-axis (brain and spinal cord) with MRI’s w/wo contrast, every year and prn   Patient/family counseled on medication compliance.     The patient understands and agrees that due to the complexity of his/her diagnosis, results of any testing and further recommendations will typically be discussed/made during a face to face encounter in my office. The patient and/or family further understands it is their responsibility to keep proper follow up.     Patient was seen for 45 minutes face to face of which > 50% of appointment time was spent on counseling and coordination of care regarding the above.    Vivian Moy NP-C  Neurology

## 2020-07-31 ENCOUNTER — HOSPITAL ENCOUNTER (OUTPATIENT)
Dept: LAB | Facility: MEDICAL CENTER | Age: 50
End: 2020-07-31
Attending: PSYCHIATRY & NEUROLOGY
Payer: COMMERCIAL

## 2020-07-31 ENCOUNTER — HOSPITAL ENCOUNTER (OUTPATIENT)
Dept: LAB | Facility: MEDICAL CENTER | Age: 50
End: 2020-07-31
Attending: REGISTERED NURSE
Payer: COMMERCIAL

## 2020-07-31 DIAGNOSIS — G35 MULTIPLE SCLEROSIS (HCC): ICD-10-CM

## 2020-07-31 DIAGNOSIS — G35 MS (MULTIPLE SCLEROSIS) (HCC): ICD-10-CM

## 2020-07-31 LAB
25(OH)D3 SERPL-MCNC: 51 NG/ML (ref 30–100)
ALBUMIN SERPL BCP-MCNC: 4.4 G/DL (ref 3.2–4.9)
ALBUMIN/GLOB SERPL: 1.7 G/DL
ALP SERPL-CCNC: 70 U/L (ref 30–99)
ALT SERPL-CCNC: 28 U/L (ref 2–50)
ANION GAP SERPL CALC-SCNC: 14 MMOL/L (ref 7–16)
AST SERPL-CCNC: 17 U/L (ref 12–45)
BASOPHILS # BLD AUTO: 0.7 % (ref 0–1.8)
BASOPHILS # BLD: 0.03 K/UL (ref 0–0.12)
BILIRUB SERPL-MCNC: 0.3 MG/DL (ref 0.1–1.5)
BUN SERPL-MCNC: 14 MG/DL (ref 8–22)
CALCIUM SERPL-MCNC: 9.2 MG/DL (ref 8.5–10.5)
CHLORIDE SERPL-SCNC: 102 MMOL/L (ref 96–112)
CO2 SERPL-SCNC: 20 MMOL/L (ref 20–33)
CREAT SERPL-MCNC: 0.71 MG/DL (ref 0.5–1.4)
EOSINOPHIL # BLD AUTO: 0.05 K/UL (ref 0–0.51)
EOSINOPHIL NFR BLD: 1.1 % (ref 0–6.9)
ERYTHROCYTE [DISTWIDTH] IN BLOOD BY AUTOMATED COUNT: 41.5 FL (ref 35.9–50)
FASTING STATUS PATIENT QL REPORTED: NORMAL
GLOBULIN SER CALC-MCNC: 2.6 G/DL (ref 1.9–3.5)
GLUCOSE SERPL-MCNC: 98 MG/DL (ref 65–99)
HCT VFR BLD AUTO: 46.1 % (ref 37–47)
HGB BLD-MCNC: 15.4 G/DL (ref 12–16)
IMM GRANULOCYTES # BLD AUTO: 0.03 K/UL (ref 0–0.11)
IMM GRANULOCYTES NFR BLD AUTO: 0.7 % (ref 0–0.9)
LYMPHOCYTES # BLD AUTO: 0.87 K/UL (ref 1–4.8)
LYMPHOCYTES NFR BLD: 19.3 % (ref 22–41)
MCH RBC QN AUTO: 30.1 PG (ref 27–33)
MCHC RBC AUTO-ENTMCNC: 33.4 G/DL (ref 33.6–35)
MCV RBC AUTO: 90.2 FL (ref 81.4–97.8)
MONOCYTES # BLD AUTO: 0.56 K/UL (ref 0–0.85)
MONOCYTES NFR BLD AUTO: 12.4 % (ref 0–13.4)
NEUTROPHILS # BLD AUTO: 2.96 K/UL (ref 2–7.15)
NEUTROPHILS NFR BLD: 65.8 % (ref 44–72)
NRBC # BLD AUTO: 0 K/UL
NRBC BLD-RTO: 0 /100 WBC
PLATELET # BLD AUTO: 207 K/UL (ref 164–446)
PMV BLD AUTO: 10.4 FL (ref 9–12.9)
POTASSIUM SERPL-SCNC: 4.4 MMOL/L (ref 3.6–5.5)
PROT SERPL-MCNC: 7 G/DL (ref 6–8.2)
RBC # BLD AUTO: 5.11 M/UL (ref 4.2–5.4)
SODIUM SERPL-SCNC: 136 MMOL/L (ref 135–145)
VIT B12 SERPL-MCNC: 518 PG/ML (ref 211–911)
WBC # BLD AUTO: 4.5 K/UL (ref 4.8–10.8)

## 2020-07-31 PROCEDURE — 82607 VITAMIN B-12: CPT

## 2020-07-31 PROCEDURE — 36415 COLL VENOUS BLD VENIPUNCTURE: CPT

## 2020-07-31 PROCEDURE — 80053 COMPREHEN METABOLIC PANEL: CPT

## 2020-07-31 PROCEDURE — 82306 VITAMIN D 25 HYDROXY: CPT

## 2020-07-31 PROCEDURE — 85025 COMPLETE CBC W/AUTO DIFF WBC: CPT

## 2020-08-31 ENCOUNTER — HOSPITAL ENCOUNTER (OUTPATIENT)
Dept: RADIOLOGY | Facility: MEDICAL CENTER | Age: 50
End: 2020-08-31
Attending: REGISTERED NURSE
Payer: COMMERCIAL

## 2020-08-31 DIAGNOSIS — G35 MULTIPLE SCLEROSIS (HCC): ICD-10-CM

## 2020-08-31 PROCEDURE — 70553 MRI BRAIN STEM W/O & W/DYE: CPT

## 2020-08-31 PROCEDURE — A9576 INJ PROHANCE MULTIPACK: HCPCS | Performed by: REGISTERED NURSE

## 2020-08-31 PROCEDURE — 72157 MRI CHEST SPINE W/O & W/DYE: CPT

## 2020-08-31 PROCEDURE — 700117 HCHG RX CONTRAST REV CODE 255: Performed by: REGISTERED NURSE

## 2020-08-31 PROCEDURE — 72156 MRI NECK SPINE W/O & W/DYE: CPT

## 2020-08-31 RX ADMIN — GADOTERIDOL 20 ML: 279.3 INJECTION, SOLUTION INTRAVENOUS at 14:08

## 2020-09-09 NOTE — PROGRESS NOTES
"[facility]  MULTIPLE SCLEROSIS & NEUROIMMUNOLOGY  FOLLOW-UP VISIT    DISEASE SUMMARY:  Principal neurologic diagnosis:  MS  Diagnosis of MS: 2018  Disease History:  In 2016 had left foot drop that she had treated by a chiroprator  Disease course at onset:  Vertigo  Current disease course: No new reoccring symtoms  Previous disease therapies:  Tecfidera  Current disease therapies:  Tecfidera  Symptomatic therapies:  Sertraline 25 mg for c/o of depression with onset of MS diagnosis and illness of father she is wanting to wean off.  CSF:  - 11/27/2018  Results for JOSHUA MICHEL (MRN [id number]) as of 9/11/2020 09:27   Ref. Range 11/27/2018 13:00   CSF Albumin Latest Ref Range: 0 - 35 mg/dL 12   Serum Albumin Latest Ref Range: 3500 - 5200 mg/dL 4090   Albumin Ratio Latest Ref Range: 0.09 - 0.25 ratio 0.42 (H)   Albumin Index Latest Ref Range: 0.0 - 9.0 ratio 2.9   IgG Ratio Latest Ref Range: 0.28 - 0.66 ratio 1.69 (H)   Cns IgG Syn Latest Ref Range: <=8.0 mg/d 14.4 (H)   Oligoclonal Bands CSF Latest Ref Range: 0 - 1 Bands 14 (H)   Oligoclonal Bands CSF Latest Ref Range: Negative  Positive (A)   Interpretation Unknown See Note     Other Testing:    MRI head: 2018 Multiple supratentorial periventricular and juxtacortical T2 flair lesions  -   MRI cervical spine:  2018:  Hazy increased T2 signa lwithin cervical cord whih may indicate edema, hyelopathy or demyelation consistent with suspected MS.  MRI thoracic spine:  2018 No demeylination of thoracic cord    CURRENT MRI'S 08/31/2020  Brain:     1.  Multiple supratentorial white matter lesions with lesion morphology and distribution consistent with multiple sclerosis. No \"active\" enhancing lesions, discrete new lesion, or overall progression of lesion burden since 7/19/2019.  2.  No demyelinating lesions are appreciated involving the brainstem or cerebellum.        Cervical 08/31/2020  IMPRESSION:     1.  Stable T2 hyperintense foci in the cervical and upper thoracic " cord consistent with the patient's history of multiple sclerosis. No new or enhancing lesions identified.     2.  No significant change in degenerative disc disease at C5-C6 and C6-C7    Thoracic 08/31/2020  IMPRESSION:     1.  No significant change in high T2 signal focus in the upper thoracic cord. No associated enhancement identified. No new lesions.    CC: Follow up MS    INTERVAL HISTORY:  Deann Torres is a 50 y.o. female with MS.  We [facility] on 07[facility]24/2020  At that time we recommended survelilence MRI's and blood work for DMT tecfidera.  Today, Deann was here for follow up results , and she provided the following interval history:        A review of MS-related symptoms was notable for the following:    Fatigue: on and off at intervals nothing too profound  Weakness: no complaints  Numbness: none at all   Incoordination: Walked 25 feet easily and without difficulty in 10:03 sec  Spasms/Spasticity: Denies  Vision Impairment: none  Walking/Balance Problems: none at this time  Neuralgia: none  Bowel Symptoms: N/A  Bladder Symptoms: N/A  Heat Sensitivity: Yes when she gardens  Depression: currently on sertraline asking to wean off  Cognitive/Memory Problems: mild   Sexual Dysfunction: not addressed  Anxiety: denies at this time, more comfortable with diagnosis    MEDICATIONS:  Current Outpatient Medications   Medication Sig   • sertraline (ZOLOFT) 25 MG tablet TAKE 2 TABLETS BY MOUTH EVERY DAY   • Dimethyl Fumarate (TECFIDERA) 240 MG CAPSULE DELAYED RELEASE Take 240 mg by mouth 2 Times a Day.   • Cholecalciferol (VITAMIN D3) 5000 units Cap Take 1 Cap by mouth every day.   • hydrOXYzine HCl (ATARAX) 25 MG Tab Take 1/2 to 1 pill up to 3 times daily as needed for anxiety and for insomnia (Patient not taking: Reported on 7/9/2019)       MEDICAL, SOCIAL, AND FAMILY HISTORY:  There is no change in the patient's ROS or PFSH from their previous visit on 07/24/2020    REVIEW OF  SYSTEMS:  A ROS was completed.  Pertinent positives and negatives were included in the HPI, above.  All other systems were reviewed and are negative.    PHYSICAL EXAM:  General/Medical:  - NAD  - hair, skin, nails, and joints were normal  - neck was supple without Lhermitte's phenomenon  - heart rate and rhythm were regular, no carotid bruits appreciated    Neuro:  MENTAL STATUS: awake and alert; no deficits of speech or language; oriented to person, place, and time; affect was appropriate to situation    CRANIAL NERVES:    II: discs sharp; pupils reactive to light    III/IV/VI: versions intact without nystagmus    V: facial sensation symmetric to light touch    VII: facial expression symmetric    VIII: hearing intact to voice    IX/X: palate elevates symmetrically    XI: shoulder shrug symmetric    XII: tongue midline    MOTOR:  - bulk and tone were normal throughout  Upper Extremity Strength  (R/L)    5/5   Elbow flexion 5/5   Elbow extension 5/5   Shoulder abduction 5/5     Lower Extremity Strength  (R/L)   Hip flexion 5/5   Knee extension 5/5   Knee flexion 5/5   Ankle plantarflexion 5/5   Ankle dorsiflexion 5/5     -  can walk on toes and heels  -  can rise from a seated position with arms crossed  -  no pronator drift; no abnormal movements    SENSATION:  - light touch: equal to bilateral sides of body  - pinprick:equal to bilateral sides of body  - Romberg was absent    COORDINATION:  - finger to nose and heel to shin were both normal, no ataxia on exam  - finger tapping was rapid and accurate, bilaterally    REFLEXES:  Reflex Right Left   BR 2+ 2+   Biceps 2+ 2+   Triceps 2+ 2+   Patellae 2+ 2+   Achilles 2+ 2+   Toes down down     GAIT:  - narrow base and normal; intact heel-raised, toe-raised, and tandem gait  No left foot drop noted    QUANTITATIVE SCORES:  Timed 25-foot walk (sec): 10.03 Assistive device: none    REVIEW OF IMAGING STUDIES: I reviewed the following studies:  MRI  Brain/Cervical/Thoracic as above    REVIEW OF LABORATORY STUDIES:  Results for DEANN TORRES (MRN [id number]) as of 9/11/2020 15:14   Ref. Range 7/31/2020 06:31   WBC Latest Ref Range: 4.8 - 10.8 K/uL 4.5 (L)   RBC Latest Ref Range: 4.20 - 5.40 M/uL 5.11   Hemoglobin Latest Ref Range: 12.0 - 16.0 g/dL 15.4   Hematocrit Latest Ref Range: 37.0 - 47.0 % 46.1   MCV Latest Ref Range: 81.4 - 97.8 fL 90.2   MCH Latest Ref Range: 27.0 - 33.0 pg 30.1   MCHC Latest Ref Range: 33.6 - 35.0 g/dL 33.4 (L)   RDW Latest Ref Range: 35.9 - 50.0 fL 41.5   Platelet Count Latest Ref Range: 164 - 446 K/uL 207   MPV Latest Ref Range: 9.0 - 12.9 fL 10.4   Neutrophils-Polys Latest Ref Range: 44.00 - 72.00 % 65.80   Neutrophils (Absolute) Latest Ref Range: 2.00 - 7.15 K/uL 2.96   Lymphocytes Latest Ref Range: 22.00 - 41.00 % 19.30 (L)   Lymphs (Absolute) Latest Ref Range: 1.00 - 4.80 K/uL 0.87 (L)   Monocytes Latest Ref Range: 0.00 - 13.40 % 12.40   Monos (Absolute) Latest Ref Range: 0.00 - 0.85 K/uL 0.56   Eosinophils Latest Ref Range: 0.00 - 6.90 % 1.10   Eos (Absolute) Latest Ref Range: 0.00 - 0.51 K/uL 0.05   Basophils Latest Ref Range: 0.00 - 1.80 % 0.70   Baso (Absolute) Latest Ref Range: 0.00 - 0.12 K/uL 0.03   Immature Granulocytes Latest Ref Range: 0.00 - 0.90 % 0.70   Immature Granulocytes (abs) Latest Ref Range: 0.00 - 0.11 K/uL 0.03   Nucleated RBC Latest Units: /100 WBC 0.00   NRBC (Absolute) Latest Units: K/uL 0.00       ASSESSMENT:  Deann Torres is a 50 y.o. female with a hx of MS since 2018. Here today for follow-up labs and follow up MRI results.  No new complaints or new persistent/worsening of symptoms.      1. Multiple sclerosis (HCC)    Continue tecfidera at this time      PLAN:  Disease-Modifying Therapy:  Continue tecfidera  -   Laboratory Studies:    Imaging: one year or sooner if symptoms worsen    Symptomatic Therapy:  Patient to talk to her provider about sertraline, she  is  considering discontinuing   -      - Follow-Up:  November to assess sertraline and what was discussed with her prescribing provider.      Patient was seen for 40 minutes face to face of which > 50% of appointment time was spent on counseling and coordination of care regarding the above.      JERRY Morales.

## 2020-09-11 ENCOUNTER — OFFICE VISIT (OUTPATIENT)
Dept: NEUROLOGY | Facility: MEDICAL CENTER | Age: 50
End: 2020-09-11
Payer: COMMERCIAL

## 2020-09-11 VITALS
SYSTOLIC BLOOD PRESSURE: 122 MMHG | HEIGHT: 65 IN | OXYGEN SATURATION: 96 % | TEMPERATURE: 97.9 F | BODY MASS INDEX: 39.67 KG/M2 | WEIGHT: 238.1 LBS | HEART RATE: 88 BPM | DIASTOLIC BLOOD PRESSURE: 78 MMHG

## 2020-09-11 DIAGNOSIS — G35 MULTIPLE SCLEROSIS (HCC): ICD-10-CM

## 2020-09-11 PROCEDURE — 99215 OFFICE O/P EST HI 40 MIN: CPT | Performed by: REGISTERED NURSE

## 2020-09-11 ASSESSMENT — FIBROSIS 4 INDEX: FIB4 SCORE: 0.74

## 2020-09-25 ENCOUNTER — HOSPITAL ENCOUNTER (OUTPATIENT)
Dept: RADIOLOGY | Facility: MEDICAL CENTER | Age: 50
End: 2020-09-25
Attending: NURSE PRACTITIONER
Payer: COMMERCIAL

## 2020-09-25 DIAGNOSIS — Z12.31 VISIT FOR SCREENING MAMMOGRAM: ICD-10-CM

## 2020-09-25 PROCEDURE — 77067 SCR MAMMO BI INCL CAD: CPT

## 2020-09-29 DIAGNOSIS — F43.9 STRESS: ICD-10-CM

## 2020-09-29 DIAGNOSIS — F41.9 ANXIETY: ICD-10-CM

## 2020-09-29 RX ORDER — SERTRALINE HYDROCHLORIDE 25 MG/1
TABLET, FILM COATED ORAL
Qty: 60 TAB | Refills: 0 | OUTPATIENT
Start: 2020-09-29

## 2020-09-29 NOTE — TELEPHONE ENCOUNTER
Patient has been told on multiple occasions to make appt and that has not happened. Med is denied and pharmacy is aware.      How Severe Are Your Spot(S)?: mild What Type Of Note Output Would You Prefer (Optional)?: Standard Output What Is The Reason For Today's Visit?: Full Body Skin Examination What Is The Reason For Today's Visit? (Being Monitored For X): concerning skin lesions on an annual basis

## 2020-12-08 ENCOUNTER — OFFICE VISIT (OUTPATIENT)
Dept: NEUROLOGY | Facility: MEDICAL CENTER | Age: 50
End: 2020-12-08
Payer: COMMERCIAL

## 2020-12-08 VITALS
HEART RATE: 96 BPM | OXYGEN SATURATION: 98 % | DIASTOLIC BLOOD PRESSURE: 76 MMHG | BODY MASS INDEX: 39.65 KG/M2 | HEIGHT: 65 IN | WEIGHT: 238 LBS | TEMPERATURE: 97.9 F | SYSTOLIC BLOOD PRESSURE: 114 MMHG

## 2020-12-08 DIAGNOSIS — G35 MULTIPLE SCLEROSIS (HCC): ICD-10-CM

## 2020-12-08 PROCEDURE — 99214 OFFICE O/P EST MOD 30 MIN: CPT | Performed by: REGISTERED NURSE

## 2020-12-08 ASSESSMENT — FIBROSIS 4 INDEX: FIB4 SCORE: 0.74

## 2020-12-08 NOTE — PROGRESS NOTES
.[facility]  MULTIPLE SCLEROSIS & NEUROIMMUNOLOGY  FOLLOW-UP VISIT    DISEASE SUMMARY:    MS History:  Diagnosed: 2018  Lumbar puncture:  11/27/2018 14 oliglonal bands  First presentation: Foot drop in 2016(tx by chiroprator) then vertigo  Disease modifying treatment:               Current: Tecfidera              Previous: N/A  Former or other neurologist:   Last MRI: August 2020      CC: Multiple Sclerosis    INTERVAL HISTORY:  Deann Torres is a 50 y.o. female with a hx of MS.   I last saw Deann in Clinic on 09/11/2020  At that time I recommended she return to her prescribing MD for sertraline as she wanted to come off it.  Placed on it during illness/death of father and felt she wanted to come off of it. At that time her current MRI's and blood work was reviewed.  Today, Deann was alone, and she provided the following interval history:    No new symptoms/setbacks or flares in regards to MS.  She is requesting a generic tecfidera as come January her insurance will not cover the DMT.      She decided not to wean off of sertraline as she feels both the covid pandemic and weaning in winter time is probably not a good idea.    A review of MS-related symptoms was notable for the following:    Fatigue: no  Weakness: no  Numbness: no  Incoordination: no  Spasms/Spasticity: no  Vision Impairment: no  Eyes checked in February no changes  Walking/Balance Problems: no  Neuralgia: no  Bowel Symptoms: no  Bladder Symptoms: no  Heat Sensitivity: yes; needs to take a nap after working in yard  Depression: yes; ok on sertraline  Cognitive/Memory Problems: yes; only occasional  Sexual Dysfunction: no  Anxiety: yes; occasional    MEDICATIONS:  Current Outpatient Medications   Medication Sig   • sertraline (ZOLOFT) 25 MG tablet TAKE 2 TABLETS BY MOUTH EVERY DAY   • Dimethyl Fumarate (TECFIDERA) 240 MG CAPSULE DELAYED RELEASE Take 240 mg by mouth 2 Times a Day.   • Cholecalciferol (VITAMIN D3) 5000 units Cap Take 1 Cap by  mouth every day.   • hydrOXYzine HCl (ATARAX) 25 MG Tab Take 1/2 to 1 pill up to 3 times daily as needed for anxiety and for insomnia (Patient not taking: Reported on 7/9/2019)     MEDICAL, SOCIAL, AND FAMILY HISTORY:  There is no change in the patient's ROS or PFSH from their previous visit on .    REVIEW OF SYSTEMS:  A ROS was completed.  Pertinent positives and negatives were included in the HPI, above.  All other systems were reviewed and are negative.    PHYSICAL EXAM:  General/Medical:  - NAD  - hair, skin, nails, and joints were normal  - neck was supple without Lhermitte's phenomenon  - heart rate and rhythm were regular, no carotid bruits appreciated    Neuro:  MENTAL STATUS: awake and alert; no deficits of speech or language; oriented to person, place, and time; affect was appropriate to situation    CRANIAL NERVES:    II: pupils 3/3 to 2/2 without a relative afferent pupillary defect; discs sharp; no red desaturation noted    III/IV/VI: versions intact without nystagmus    V: facial sensation symmetric to light touch    VII: facial expression symmetric    VIII: hearing intact to finger rub    IX/X: palate elevates symmetrically    XI: shoulder shrug symmetric    XII: tongue midline    MOTOR:  - bulk and tone were normal throughout  Upper Extremity Strength  (R/L)    5/5   Elbow flexion 5/5   Elbow extension 5/5   Shoulder abduction 5/5     Lower Extremity Strength  (R/L)   Hip flexion 5/5   Knee extension 5/5   Knee flexion 5/5   Ankle plantarflexion 5/5   Ankle dorsiflexion 5/5     - can walk on toes and heels  -  no pronator drift; no abnormal movements    SENSATION:  - light touch: equal to bilateral sides of body    - Romberg: absent    COORDINATION:  - finger to nose was normal, no ataxia on exam  - finger tapping was rapid and accurate, bilaterally    REFLEXES:  Reflex Right Left   BR 2+ 2+   Biceps 2+ 2+   Triceps 2+ 2+   Patellae 2+ 2+   Achilles 2+ 2+   Toes down down     GAIT:  - narrow base  and normal  - heel-raised and toe-raised gait: intact  - tandem gait: intact    QUANTITATIVE SCORES:  Timed 25-foot walk (sec): 4.54  Assistive device: none    REVIEW OF IMAGING STUDIES: Brain, cervical and thoracic all reviewed with no new lesions noted.  These were reviewed also on 09/11/2020.     REVIEW OF LABORATORY STUDIES:  Results for JOSHUA MICHEL (MRN [id number]) as of 12/8/2020 19:14   Ref. Range 7/31/2020 06:31   WBC Latest Ref Range: 4.8 - 10.8 K/uL 4.5 (L)   RBC Latest Ref Range: 4.20 - 5.40 M/uL 5.11   Hemoglobin Latest Ref Range: 12.0 - 16.0 g/dL 15.4   Hematocrit Latest Ref Range: 37.0 - 47.0 % 46.1   MCV Latest Ref Range: 81.4 - 97.8 fL 90.2   MCH Latest Ref Range: 27.0 - 33.0 pg 30.1   MCHC Latest Ref Range: 33.6 - 35.0 g/dL 33.4 (L)   RDW Latest Ref Range: 35.9 - 50.0 fL 41.5   Platelet Count Latest Ref Range: 164 - 446 K/uL 207   MPV Latest Ref Range: 9.0 - 12.9 fL 10.4   Neutrophils-Polys Latest Ref Range: 44.00 - 72.00 % 65.80   Neutrophils (Absolute) Latest Ref Range: 2.00 - 7.15 K/uL 2.96   Lymphocytes Latest Ref Range: 22.00 - 41.00 % 19.30 (L)   Lymphs (Absolute) Latest Ref Range: 1.00 - 4.80 K/uL 0.87 (L)   Monocytes Latest Ref Range: 0.00 - 13.40 % 12.40   Monos (Absolute) Latest Ref Range: 0.00 - 0.85 K/uL 0.56   Eosinophils Latest Ref Range: 0.00 - 6.90 % 1.10   Eos (Absolute) Latest Ref Range: 0.00 - 0.51 K/uL 0.05   Basophils Latest Ref Range: 0.00 - 1.80 % 0.70   Baso (Absolute) Latest Ref Range: 0.00 - 0.12 K/uL 0.03   Immature Granulocytes Latest Ref Range: 0.00 - 0.90 % 0.70   Immature Granulocytes (abs) Latest Ref Range: 0.00 - 0.11 K/uL 0.03   Nucleated RBC Latest Units: /100 WBC 0.00   NRBC (Absolute) Latest Units: K/uL 0.00   Results for JOSHUA MICHEL (MRN [id number]) as of 12/8/2020 19:14   Ref. Range 7/31/2020 06:31   Sodium Latest Ref Range: 135 - 145 mmol/L 136   Potassium Latest Ref Range: 3.6 - 5.5 mmol/L 4.4   Chloride Latest Ref Range: 96 - 112 mmol/L 102   Co2  Latest Ref Range: 20 - 33 mmol/L 20   Anion Gap Latest Ref Range: 7.0 - 16.0  14.0   Glucose Latest Ref Range: 65 - 99 mg/dL 98   Bun Latest Ref Range: 8 - 22 mg/dL 14   Creatinine Latest Ref Range: 0.50 - 1.40 mg/dL 0.71   GFR If  Latest Ref Range: >60 mL/min/1.73 m 2 >60   GFR If Non  Latest Ref Range: >60 mL/min/1.73 m 2 >60   Calcium Latest Ref Range: 8.5 - 10.5 mg/dL 9.2   AST(SGOT) Latest Ref Range: 12 - 45 U/L 17   ALT(SGPT) Latest Ref Range: 2 - 50 U/L 28   Alkaline Phosphatase Latest Ref Range: 30 - 99 U/L 70   Total Bilirubin Latest Ref Range: 0.1 - 1.5 mg/dL 0.3   Albumin Latest Ref Range: 3.2 - 4.9 g/dL 4.4   Total Protein Latest Ref Range: 6.0 - 8.2 g/dL 7.0   Globulin Latest Ref Range: 1.9 - 3.5 g/dL 2.6   A-G Ratio Latest Units: g/dL 1.7   Fasting Status Unknown Fasting       ASSESSMENT:  Deann Torres is a 50 y.o. female with MS.  Doing well with no setbacks or flares or new lesions found on MRI's.  Here today for generic DMT order as tecfidera is not going to be covered after January 1st.      1. Multiple sclerosis (HCC)  - Comp Metabolic Panel; Future  - CBC WITH DIFFERENTIAL; Future      PLAN:  RTC in three months to see how vumerity is working out.    Patient was seen for 35 minutes face to face of which > 50% of appointment time was spent on counseling and coordination of care regarding the above.      - immunotherapy:  Continue vumerity  - symptoms therapy:  Continue sertraline  - labs ordered for three months time  - imaging:  Next August unless new symptoms.    Follow-Up:  - Three months    Signed: YOKASTA Morales at 3:10 PM on 12/08/20  Multiple Sclerosis and Neuroimmunology

## 2020-12-08 NOTE — ASSESSMENT & PLAN NOTE
MS is fine.  Decided to stay on sertraline   Insurance will not cover tecfidera, needs to switch to generic.

## 2020-12-21 ENCOUNTER — OFFICE VISIT (OUTPATIENT)
Dept: URGENT CARE | Facility: PHYSICIAN GROUP | Age: 50
End: 2020-12-21
Payer: COMMERCIAL

## 2020-12-21 VITALS
RESPIRATION RATE: 16 BRPM | BODY MASS INDEX: 39.49 KG/M2 | HEART RATE: 97 BPM | SYSTOLIC BLOOD PRESSURE: 102 MMHG | DIASTOLIC BLOOD PRESSURE: 74 MMHG | HEIGHT: 65 IN | OXYGEN SATURATION: 96 % | TEMPERATURE: 96.6 F | WEIGHT: 237 LBS

## 2020-12-21 DIAGNOSIS — R22.32 NODULE OF FINGER OF LEFT HAND: ICD-10-CM

## 2020-12-21 DIAGNOSIS — S61.209A OPEN WOUND OF FINGER, INITIAL ENCOUNTER: ICD-10-CM

## 2020-12-21 PROCEDURE — 90715 TDAP VACCINE 7 YRS/> IM: CPT | Performed by: PHYSICIAN ASSISTANT

## 2020-12-21 PROCEDURE — 99214 OFFICE O/P EST MOD 30 MIN: CPT | Mod: 25 | Performed by: PHYSICIAN ASSISTANT

## 2020-12-21 PROCEDURE — 12001 RPR S/N/AX/GEN/TRNK 2.5CM/<: CPT | Performed by: PHYSICIAN ASSISTANT

## 2020-12-21 PROCEDURE — 90471 IMMUNIZATION ADMIN: CPT | Performed by: PHYSICIAN ASSISTANT

## 2020-12-21 RX ORDER — DIMETHYL FUMARATE 240 MG/1
CAPSULE ORAL
COMMUNITY
Start: 2020-12-17 | End: 2021-05-11

## 2020-12-21 ASSESSMENT — ENCOUNTER SYMPTOMS
EYE REDNESS: 0
NAUSEA: 0
HEADACHES: 0
FEVER: 0
VOMITING: 0
EYE DISCHARGE: 0
SORE THROAT: 0
COUGH: 0
SHORTNESS OF BREATH: 0

## 2020-12-21 ASSESSMENT — FIBROSIS 4 INDEX: FIB4 SCORE: 0.74

## 2020-12-21 NOTE — PROGRESS NOTES
Subjective:      Deann Torres is a 50 y.o. female who presents with Bump (on L middle finger, bumped it and started [id number] )        HPI  This is a new problem.  The patient presents to clinic secondary to a wound to her left third digit.  The patient states she developed a bump to her left third finger x several weeks ago.  The patient is unsure how she may have developed the bump to her left third finger.  She reports no injury or trauma to the finger.  The patient states the bump was nontender.  She reports no associated swelling.  The patient states the bump was slightly red in color.  She reports no discharge/drainage from the bump.  No decreased range of motion of the left third finger.  No numbness, tingling, or weakness.  The patient assumed the bump would resolve on its own.  The patient states the bump was not causing her any problems or complications.  The patient states she accidentally bumped the bump on her car this morning causing a small wound to the center of the bump.  The patient reports continued bleeding from the wound.  The patient states she was able to control the bleeding slightly, but states it started to bleed again after removing the Band-Aid.  The patient reports no current pain or tenderness to the left third digit.  No swelling.  No bruising.  The patient has not taken any medications for her current symptoms.  She denies the use of anticoagulants, including aspirin.    PMH:  has a past medical history of Chickenpox, Depression with anxiety, and Vitamin D deficiency.  MEDS:   Current Outpatient Medications:   •  TECFIDERA 240 MG CAPSULE DELAYED RELEASE, , Disp: , Rfl:   •  sertraline (ZOLOFT) 25 MG tablet, TAKE 2 TABLETS BY MOUTH EVERY DAY, Disp: 60 Tab, Rfl: 0  •  Cholecalciferol (VITAMIN D3) 5000 units Cap, Take 1 Cap by mouth every day., Disp: , Rfl:   ALLERGIES: No Known Allergies  SURGHX: No past surgical history on file.  SOCHX:  reports that she has never smoked. She  "has never used smokeless tobacco. She reports current alcohol use. She reports that she does not use drugs.  FH: Family history was reviewed, no pertinent findings to report      Review of Systems   Constitutional: Negative for fever.   HENT: Negative for congestion, ear pain and sore throat.    Eyes: Negative for discharge and redness.   Respiratory: Negative for cough and shortness of breath.    Cardiovascular: Negative for chest pain and leg swelling.   Gastrointestinal: Negative for nausea and vomiting.   Musculoskeletal: Negative for joint pain.   Skin: Negative for rash.        + wound to left 3rd digit   Neurological: Negative for headaches.          Objective:     /74   Pulse 97   Temp 35.9 °C (96.6 °F) (Temporal)   Resp 16   Ht 1.651 m (5' 5\")   Wt 107.5 kg (237 lb)   SpO2 96%   BMI 39.44 kg/m²      Physical Exam  Constitutional:       General: She is not in acute distress.     Appearance: Normal appearance. She is well-developed. She is not ill-appearing.   HENT:      Head: Normocephalic and atraumatic.      Right Ear: External ear normal.      Left Ear: External ear normal.   Eyes:      Extraocular Movements: Extraocular movements intact.      Conjunctiva/sclera: Conjunctivae normal.   Neck:      Musculoskeletal: Normal range of motion and neck supple.   Cardiovascular:      Rate and Rhythm: Normal rate.   Pulmonary:      Effort: Pulmonary effort is normal.   Musculoskeletal:      Comments:   Left 3rd Digit:  Small (< 1cm) nodule to the dorsal aspect of the left third digit in between the PIP and DIP joints.  No tenderness to palpation.  No swelling.  No ecchymosis.  No surrounding erythema.  No increased warmth.  The nodule is semisoft and slightly movable without palpable fluctuance.  No circumferential swelling.  No signs of lymphangitis.  A central wound is present with active bleeding.  No pulsating bleeding.  ROM intact -patient demonstrates full active range of motion of the left third " digit  Neurovascular intact  Strength 5/5 -flexion/extension against resistance   Skin:     General: Skin is warm and dry.   Neurological:      Mental Status: She is alert and oriented to person, place, and time.            Progress:  Wound Care:  Irrigated the patient's wound with saline.  Applied a pressure dressing to the patient's wound to help control the bleeding.  The pressure dressing was unsuccessful today in clinic.  The patient's wound to her left third finger continued to bleed despite the applied pressure dressing, elevation, and ice.  The wound was cauterized today in clinic with the application of silver nitrate.  Hemostasis was achieved after silver nitrate application.  Applied a nonstick dressing with Coban overlying the patient's wound.  Educated the patient on proper wound care.  Advised the patient to monitor for signs of infection. Discussed ED precautions with the patient.      Tdap updated today in clinic.    Assessment/Plan:        1. Nodule of finger of left hand  - REFERRAL TO HAND SURGERY    2. Open wound of finger, initial encounter  - Tdap =>6yo IM    Differential diagnoses, supportive care, and indications for immediate follow-up discussed with patient.   Instructed to return to clinic or nearest emergency department for any change in condition, further concerns, or worsening of symptoms.    OTC Tylenol or Motrin for fever/discomfort.  Keep  wound clean and dry  Cover laceration while at work  Allow wound to be open to the air for at least a portion of the day  Avoid soaking the wound  Monitor for signs of infection  Referral to hand surgery for further evaluation of nodule  Follow-up with PCP  Return to clinic or go to the ED if symptoms worsen or fail to improve, or if patient should develop worsening/increasing/persistent pain/tenderness to the injured area, swelling, bruising, redness or warmth to the injured area, discharge/drainage from the wound, decreased range of motion,  fever/chills, secondary signs of infection, and/or any concerning symptoms.    Discussed plan with the patient, and she agrees to the above.     Please note that this dictation was created using voice recognition software. I have made every reasonable attempt to correct obvious errors, but I expect that there may be errors of grammar and possibly content that I did not discover before finalizing the note.

## 2021-01-19 ENCOUNTER — TELEPHONE (OUTPATIENT)
Dept: NEUROLOGY | Facility: MEDICAL CENTER | Age: 51
End: 2021-01-19

## 2021-01-19 NOTE — TELEPHONE ENCOUNTER
Pt got denied with Tecfidera medication. Please advise.  Let me know if you could write a short note/letter to appeal medication.

## 2021-01-20 NOTE — TELEPHONE ENCOUNTER
I called [facility] and spoke with Ashok Clemens who recommended to submit start form for Vumerity, it is still within the window.  And I also emailed Hasmukh Saunders to help me with appeal for Vumerity, since it was already denied by pt insurance.

## 2021-01-20 NOTE — TELEPHONE ENCOUNTER
Lets start Deann on Vumerity.  MB    Message text      We already submitted Vumerity early this month, and it  was previously denied. I made her an appt tomorrow if drug rep doesn't contact me soon.     Pt did let me know that insurance is covering generic Tecfidera but now she is not able to get pt assistance program and her copay is 3000, she is unable to afford.

## 2021-02-05 ENCOUNTER — HOSPITAL ENCOUNTER (OUTPATIENT)
Dept: LAB | Facility: MEDICAL CENTER | Age: 51
End: 2021-02-05
Attending: REGISTERED NURSE
Payer: COMMERCIAL

## 2021-02-05 ENCOUNTER — PRE-ADMISSION TESTING (OUTPATIENT)
Dept: ADMISSIONS | Facility: MEDICAL CENTER | Age: 51
End: 2021-02-05
Attending: ORTHOPAEDIC SURGERY
Payer: COMMERCIAL

## 2021-02-05 VITALS — HEIGHT: 64 IN | BODY MASS INDEX: 41.82 KG/M2 | WEIGHT: 244.93 LBS

## 2021-02-05 DIAGNOSIS — G35 MULTIPLE SCLEROSIS (HCC): ICD-10-CM

## 2021-02-05 DIAGNOSIS — Z01.812 PRE-OPERATIVE LABORATORY EXAMINATION: ICD-10-CM

## 2021-02-05 LAB
ALBUMIN SERPL BCP-MCNC: 4.2 G/DL (ref 3.2–4.9)
ALBUMIN/GLOB SERPL: 1.4 G/DL
ALP SERPL-CCNC: 75 U/L (ref 30–99)
ALT SERPL-CCNC: 32 U/L (ref 2–50)
ANION GAP SERPL CALC-SCNC: 10 MMOL/L (ref 7–16)
AST SERPL-CCNC: 23 U/L (ref 12–45)
BASOPHILS # BLD AUTO: 0.5 % (ref 0–1.8)
BASOPHILS # BLD: 0.03 K/UL (ref 0–0.12)
BILIRUB SERPL-MCNC: 0.4 MG/DL (ref 0.1–1.5)
BUN SERPL-MCNC: 14 MG/DL (ref 8–22)
CALCIUM SERPL-MCNC: 9.3 MG/DL (ref 8.5–10.5)
CHLORIDE SERPL-SCNC: 104 MMOL/L (ref 96–112)
CO2 SERPL-SCNC: 25 MMOL/L (ref 20–33)
CREAT SERPL-MCNC: 0.67 MG/DL (ref 0.5–1.4)
EOSINOPHIL # BLD AUTO: 0.04 K/UL (ref 0–0.51)
EOSINOPHIL NFR BLD: 0.6 % (ref 0–6.9)
ERYTHROCYTE [DISTWIDTH] IN BLOOD BY AUTOMATED COUNT: 42.7 FL (ref 35.9–50)
FASTING STATUS PATIENT QL REPORTED: NORMAL
GLOBULIN SER CALC-MCNC: 3 G/DL (ref 1.9–3.5)
GLUCOSE SERPL-MCNC: 94 MG/DL (ref 65–99)
HCT VFR BLD AUTO: 46.4 % (ref 37–47)
HGB BLD-MCNC: 15.6 G/DL (ref 12–16)
IMM GRANULOCYTES # BLD AUTO: 0.03 K/UL (ref 0–0.11)
IMM GRANULOCYTES NFR BLD AUTO: 0.5 % (ref 0–0.9)
LYMPHOCYTES # BLD AUTO: 1.31 K/UL (ref 1–4.8)
LYMPHOCYTES NFR BLD: 19.8 % (ref 22–41)
MCH RBC QN AUTO: 31.1 PG (ref 27–33)
MCHC RBC AUTO-ENTMCNC: 33.6 G/DL (ref 33.6–35)
MCV RBC AUTO: 92.4 FL (ref 81.4–97.8)
MONOCYTES # BLD AUTO: 0.9 K/UL (ref 0–0.85)
MONOCYTES NFR BLD AUTO: 13.6 % (ref 0–13.4)
NEUTROPHILS # BLD AUTO: 4.31 K/UL (ref 2–7.15)
NEUTROPHILS NFR BLD: 65 % (ref 44–72)
NRBC # BLD AUTO: 0 K/UL
NRBC BLD-RTO: 0 /100 WBC
PLATELET # BLD AUTO: 229 K/UL (ref 164–446)
PMV BLD AUTO: 10.8 FL (ref 9–12.9)
POTASSIUM SERPL-SCNC: 4.5 MMOL/L (ref 3.6–5.5)
PROT SERPL-MCNC: 7.2 G/DL (ref 6–8.2)
RBC # BLD AUTO: 5.02 M/UL (ref 4.2–5.4)
SODIUM SERPL-SCNC: 139 MMOL/L (ref 135–145)
WBC # BLD AUTO: 6.6 K/UL (ref 4.8–10.8)

## 2021-02-05 PROCEDURE — 85025 COMPLETE CBC W/AUTO DIFF WBC: CPT

## 2021-02-05 PROCEDURE — 36415 COLL VENOUS BLD VENIPUNCTURE: CPT

## 2021-02-05 PROCEDURE — 80053 COMPREHEN METABOLIC PANEL: CPT

## 2021-02-05 SDOH — HEALTH STABILITY: MENTAL HEALTH: HOW OFTEN DO YOU HAVE A DRINK CONTAINING ALCOHOL?: 2-4 TIMES A MONTH

## 2021-02-05 ASSESSMENT — FIBROSIS 4 INDEX: FIB4 SCORE: 0.76

## 2021-02-12 ENCOUNTER — PRE-ADMISSION TESTING (OUTPATIENT)
Dept: ADMISSIONS | Facility: MEDICAL CENTER | Age: 51
End: 2021-02-12
Attending: ORTHOPAEDIC SURGERY
Payer: COMMERCIAL

## 2021-02-12 DIAGNOSIS — Z01.812 PRE-OPERATIVE LABORATORY EXAMINATION: ICD-10-CM

## 2021-02-12 LAB
COVID ORDER STATUS COVID19: NORMAL
SARS-COV-2 RNA RESP QL NAA+PROBE: NOTDETECTED
SPECIMEN SOURCE: NORMAL

## 2021-02-12 PROCEDURE — U0003 INFECTIOUS AGENT DETECTION BY NUCLEIC ACID (DNA OR RNA); SEVERE ACUTE RESPIRATORY SYNDROME CORONAVIRUS 2 (SARS-COV-2) (CORONAVIRUS DISEASE [COVID-19]), AMPLIFIED PROBE TECHNIQUE, MAKING USE OF HIGH THROUGHPUT TECHNOLOGIES AS DESCRIBED BY CMS-2020-01-R: HCPCS

## 2021-02-12 PROCEDURE — U0005 INFEC AGEN DETEC AMPLI PROBE: HCPCS

## 2021-02-12 PROCEDURE — C9803 HOPD COVID-19 SPEC COLLECT: HCPCS

## 2021-02-18 ENCOUNTER — HOSPITAL ENCOUNTER (OUTPATIENT)
Facility: MEDICAL CENTER | Age: 51
End: 2021-02-18
Attending: ORTHOPAEDIC SURGERY | Admitting: ORTHOPAEDIC SURGERY
Payer: COMMERCIAL

## 2021-02-18 RX ORDER — LIDOCAINE HYDROCHLORIDE 10 MG/ML
INJECTION, SOLUTION EPIDURAL; INFILTRATION; INTRACAUDAL; PERINEURAL
Status: DISCONTINUED
Start: 2021-02-18 | End: 2021-02-18 | Stop reason: HOSPADM

## 2021-02-18 RX ORDER — BUPIVACAINE HYDROCHLORIDE 5 MG/ML
INJECTION, SOLUTION EPIDURAL; INTRACAUDAL
Status: DISCONTINUED
Start: 2021-02-18 | End: 2021-02-18 | Stop reason: HOSPADM

## 2021-02-18 RX ORDER — SODIUM CHLORIDE, SODIUM LACTATE, POTASSIUM CHLORIDE, CALCIUM CHLORIDE 600; 310; 30; 20 MG/100ML; MG/100ML; MG/100ML; MG/100ML
INJECTION, SOLUTION INTRAVENOUS CONTINUOUS
Status: DISCONTINUED | OUTPATIENT
Start: 2021-02-18 | End: 2021-02-18 | Stop reason: HOSPADM

## 2021-02-18 NOTE — DISCHARGE INSTRUCTIONS
ACTIVITY: Rest and take it easy for the first 24 hours.  A responsible adult is recommended to remain with you during that time.  It is normal to feel sleepy.  We encourage you to not do anything that requires balance, judgment or coordination.    MILD FLU-LIKE SYMPTOMS ARE NORMAL. YOU MAY EXPERIENCE GENERALIZED MUSCLE ACHES, THROAT IRRITATION, HEADACHE AND/OR SOME NAUSEA.    FOR 24 HOURS DO NOT:  Drive, operate machinery or run household appliances.  Drink beer or alcoholic beverages.   Make important decisions or sign legal documents.    SPECIAL INSTRUCTIONS: Please see Dr. Crabtree's instructions.     DIET: To avoid nausea, slowly advance diet as tolerated, avoiding spicy or greasy foods for the first day.  Add more substantial food to your diet according to your physician's instructions.  Babies can be fed formula or breast milk as soon as they are hungry.  INCREASE FLUIDS AND FIBER TO AVOID CONSTIPATION.      FOLLOW-UP APPOINTMENT:  A follow-up appointment should be arranged with your doctor; call to schedule.    You should CALL YOUR PHYSICIAN if you develop:  Fever greater than 101 degrees F.  Pain not relieved by medication, or persistent nausea or vomiting.  Excessive bleeding (blood soaking through dressing) or unexpected drainage from the wound.  Extreme redness or swelling around the incision site, drainage of pus or foul smelling drainage.  Inability to urinate or empty your bladder within 8 hours.  Problems with breathing or chest pain.    You should call 911 if you develop problems with breathing or chest pain.  If you are unable to contact your doctor or surgical center, you should go to the nearest emergency room or urgent care center.  Physician's telephone #: [phone].    If any questions arise, call your doctor.  If your doctor is not available, please feel free to call the Surgical Center at [phone]. The Contact Center is open Monday through Friday 7AM to 5PM and may speak to a nurse  at [phone], or toll free at [phone].     A registered nurse may call you a few days after your surgery to see how you are doing after your procedure.    MEDICATIONS: Resume taking daily medication.  Take prescribed pain medication with food.  If no medication is prescribed, you may take non-aspirin pain medication if needed.  PAIN MEDICATION CAN BE VERY CONSTIPATING.  Take a stool softener or laxative such as senokot, pericolace, or milk of magnesia if needed.    Prescription given for *Patient already has*.  Last pain medication given at ***.    If your physician has prescribed pain medication that includes Acetaminophen (Tylenol), do not take additional Acetaminophen (Tylenol) while taking the prescribed medication.    Depression / Suicide Risk    As you are discharged from this [facility] facility, it is important to learn how to keep safe from harming yourself.    Recognize the warning signs:  · Abrupt changes in personality, positive or negative- including increase in energy   · Giving away possessions  · Change in eating patterns- significant weight changes-  positive or negative  · Change in sleeping patterns- unable to sleep or sleeping all the time   · Unwillingness or inability to communicate  · Depression  · Unusual sadness, discouragement and loneliness  · Talk of wanting to die  · Neglect of personal appearance   · Rebelliousness- reckless behavior  · Withdrawal from people/activities they love  · Confusion- inability to concentrate     If you or a loved one observes any of these behaviors or has concerns about self-harm, here's what you can do:  · Talk about it- your feelings and reasons for harming yourself  · Remove any means that you might use to hurt yourself (examples: pills, rope, extension cords, firearm)  · Get professional help from the community (Mental Health, Substance Abuse, psychological counseling)  · Do not be alone:Call your Safe Contact- someone whom you trust who will  be there for you.  · Call your local CRISIS HOTLINE 743-4739 or 459-369-0600  · Call your local Children's Mobile Crisis Response Team [place] (616) 696-1702 or www.[employer]  · Call the toll free National Suicide Prevention Hotlines   · National Suicide Prevention Lifeline 391-213-TQIF (9978)  · National [employer] Line Network 800-SUICIDE (645-0338)

## 2021-03-02 ENCOUNTER — TELEPHONE (OUTPATIENT)
Dept: NEUROLOGY | Facility: MEDICAL CENTER | Age: 51
End: 2021-03-02

## 2021-03-02 NOTE — TELEPHONE ENCOUNTER
MARYSOL   [2:50 PM] Ananya Hardwick      Tried to submit an appeal for it but insurance isnt going to cover it. If she was having GI issues with Tecfidera, than they would, but since there were no clinical notes stating that, its not going to get approved.   ?] Ananya Hardwick[2:50 PM      Since her tecfidera is technically covered, they're gonna go for that one  ?

## 2021-03-23 ENCOUNTER — TELEPHONE (OUTPATIENT)
Dept: NEUROLOGY | Facility: MEDICAL CENTER | Age: 51
End: 2021-03-23

## 2021-03-23 NOTE — TELEPHONE ENCOUNTER
[phone]  Ashok from "[employer] called requesting if appeal for Vumerity was done to send to them please. To proceed with assistance program  Denial in media

## 2021-04-05 NOTE — TELEPHONE ENCOUNTER
Delores Pulido from [employer] [phone]  calling requesting appeal letter denial or approval to know next step for pt Vumerity. Please advise

## 2021-04-07 ENCOUNTER — PATIENT MESSAGE (OUTPATIENT)
Dept: NEUROLOGY | Facility: MEDICAL CENTER | Age: 51
End: 2021-04-07

## 2021-04-07 DIAGNOSIS — G35 MULTIPLE SCLEROSIS (HCC): ICD-10-CM

## 2021-04-09 ENCOUNTER — TELEPHONE (OUTPATIENT)
Dept: NEUROLOGY | Facility: MEDICAL CENTER | Age: 51
End: 2021-04-09

## 2021-04-09 NOTE — TELEPHONE ENCOUNTER
Since the PA and the Appeal has been denied for the Vumerity. Pt will now get the medication for free with financial assistance. No need to do a script for tecfidera.       Thanks

## 2021-04-09 NOTE — TELEPHONE ENCOUNTER
Pamela Park,    The patient's plan ([employer]) has excluded Vumerity from the formulary. The preferred alternative is Tecfidera. How would you like to proceed?      Thank you,    Gayatri

## 2021-05-11 ENCOUNTER — OFFICE VISIT (OUTPATIENT)
Dept: NEUROLOGY | Facility: MEDICAL CENTER | Age: 51
End: 2021-05-11
Attending: REGISTERED NURSE
Payer: COMMERCIAL

## 2021-05-11 VITALS
SYSTOLIC BLOOD PRESSURE: 118 MMHG | HEIGHT: 65 IN | BODY MASS INDEX: 40.77 KG/M2 | OXYGEN SATURATION: 96 % | TEMPERATURE: 98.1 F | DIASTOLIC BLOOD PRESSURE: 76 MMHG | HEART RATE: 91 BPM | WEIGHT: 244.71 LBS

## 2021-05-11 DIAGNOSIS — G35 MULTIPLE SCLEROSIS (HCC): ICD-10-CM

## 2021-05-11 PROCEDURE — 99211 OFF/OP EST MAY X REQ PHY/QHP: CPT | Performed by: REGISTERED NURSE

## 2021-05-11 PROCEDURE — 99215 OFFICE O/P EST HI 40 MIN: CPT | Performed by: REGISTERED NURSE

## 2021-05-11 RX ORDER — DIROXIMEL FUMARATE 231 MG/1
231 CAPSULE ORAL
Qty: 60 CAPSULE | Refills: 11 | Status: SHIPPED | OUTPATIENT
Start: 2021-05-11 | End: 2021-06-10

## 2021-05-11 ASSESSMENT — PATIENT HEALTH QUESTIONNAIRE - PHQ9: CLINICAL INTERPRETATION OF PHQ2 SCORE: 0

## 2021-05-11 ASSESSMENT — FIBROSIS 4 INDEX: FIB4 SCORE: 0.87

## 2021-05-11 NOTE — PROGRESS NOTES
"Subjective:      Deann Torres is a 51 y.o. female who presents with Follow-Up (MS )            HPI  Deann Torres is a 51 year old female who is here today for follow up MS. Here today for questions regarding her change of insurance.  [facility] will no longer cover her insurance plan and would like to know where to go and how to go about it.     Currently stable on vumerity and recent MRI's reveal no new lesions.  A full Neuro exam was completed on          ROS:   Constitutional: No fevers or chills.  Eyes: No blurry vision or eye pain.  ENT: No dysphagia or hearing loss.  Respiratory: No cough or shortness of breath.  Cardiovascular: No chest pain or palpitations.  GI: No nausea, vomiting, or diarrhea.  : No urinary incontinence or dysuria.  Musculoskeletal: No joint swelling or arthralgias.  Skin: No skin rashes.  Neuro: No headaches, dizziness, or tremors.  Endocrine: No heat or cold intolerance. No polydipsia or polyuria.  Psych: No depression or anxiety.  Heme/Lymph: No easy bruising or swollen lymph nodes.  All other review of systems were reviewed and were negative         Objective:     /76 (BP Location: Left arm, Patient Position: Sitting, BP Cuff Size: Adult)   Pulse 91   Temp 36.7 °C (98.1 °F) (Temporal)   Ht 1.651 m (5' 5\")   Wt 111 kg (244 lb 11.4 oz)   SpO2 96%   BMI 40.72 kg/m²      Physical Exam  No chest pain, SOB           Assessment/Plan:   Deann Torres is a 51 year old female with MS.  Wondering how to go about getting care else where.  Informed to sign a medical release and get her MRI's on CD for new Neurologist.      Informed patient I thought Marcia Loyola NP would be a good recommendation.           1. Multiple sclerosis (HCC)    - Diroximel Fumarate (VUMERITY) 231 MG CAPSULE DELAYED RELEASE; Take 231 mg by mouth 2 times a day for 30 days.  Dispense: 60 capsule; Refill: 11      My total time spent caring for the patient on the day of the encounter was 40  " minutes.   This does not include time spent on separately billable procedures/tests.

## 2021-05-13 ENCOUNTER — OFFICE VISIT (OUTPATIENT)
Dept: URGENT CARE | Facility: CLINIC | Age: 51
End: 2021-05-13
Payer: COMMERCIAL

## 2021-05-13 ENCOUNTER — APPOINTMENT (OUTPATIENT)
Dept: RADIOLOGY | Facility: MEDICAL CENTER | Age: 51
End: 2021-05-13
Attending: EMERGENCY MEDICINE
Payer: COMMERCIAL

## 2021-05-13 ENCOUNTER — APPOINTMENT (OUTPATIENT)
Dept: RADIOLOGY | Facility: IMAGING CENTER | Age: 51
End: 2021-05-13
Attending: NURSE PRACTITIONER
Payer: COMMERCIAL

## 2021-05-13 ENCOUNTER — HOSPITAL ENCOUNTER (EMERGENCY)
Facility: MEDICAL CENTER | Age: 51
End: 2021-05-14
Attending: EMERGENCY MEDICINE
Payer: COMMERCIAL

## 2021-05-13 VITALS
RESPIRATION RATE: 15 BRPM | BODY MASS INDEX: 36.65 KG/M2 | OXYGEN SATURATION: 97 % | WEIGHT: 220 LBS | HEIGHT: 65 IN | SYSTOLIC BLOOD PRESSURE: 122 MMHG | HEART RATE: 93 BPM | TEMPERATURE: 98.2 F | DIASTOLIC BLOOD PRESSURE: 73 MMHG

## 2021-05-13 VITALS
HEIGHT: 65 IN | SYSTOLIC BLOOD PRESSURE: 120 MMHG | BODY MASS INDEX: 36.65 KG/M2 | TEMPERATURE: 98.7 F | RESPIRATION RATE: 16 BRPM | OXYGEN SATURATION: 95 % | DIASTOLIC BLOOD PRESSURE: 80 MMHG | HEART RATE: 103 BPM | WEIGHT: 220 LBS

## 2021-05-13 DIAGNOSIS — S82.892A CLOSED FRACTURE OF LEFT ANKLE, INITIAL ENCOUNTER: ICD-10-CM

## 2021-05-13 DIAGNOSIS — S99.912A INJURY OF LEFT ANKLE, INITIAL ENCOUNTER: ICD-10-CM

## 2021-05-13 DIAGNOSIS — S82.852A CLOSED TRIMALLEOLAR FRACTURE OF LEFT ANKLE, INITIAL ENCOUNTER: ICD-10-CM

## 2021-05-13 DIAGNOSIS — S93.05XA DISLOCATION OF LEFT ANKLE JOINT, INITIAL ENCOUNTER: ICD-10-CM

## 2021-05-13 PROCEDURE — 27840 TREAT ANKLE DISLOCATION: CPT

## 2021-05-13 PROCEDURE — 99215 OFFICE O/P EST HI 40 MIN: CPT | Performed by: NURSE PRACTITIONER

## 2021-05-13 PROCEDURE — 73700 CT LOWER EXTREMITY W/O DYE: CPT | Mod: LT

## 2021-05-13 PROCEDURE — 96374 THER/PROPH/DIAG INJ IV PUSH: CPT

## 2021-05-13 PROCEDURE — 73610 X-RAY EXAM OF ANKLE: CPT | Mod: TC,LT | Performed by: NURSE PRACTITIONER

## 2021-05-13 PROCEDURE — 302875 HCHG BANDAGE ACE 4 OR 6""

## 2021-05-13 PROCEDURE — 99285 EMERGENCY DEPT VISIT HI MDM: CPT

## 2021-05-13 PROCEDURE — 306637 HCHG MISC ORTHO ITEM RC 0274

## 2021-05-13 PROCEDURE — 29515 APPLICATION SHORT LEG SPLINT: CPT

## 2021-05-13 PROCEDURE — 73600 X-RAY EXAM OF ANKLE: CPT | Mod: LT

## 2021-05-13 PROCEDURE — 94799 UNLISTED PULMONARY SVC/PX: CPT

## 2021-05-13 PROCEDURE — 700111 HCHG RX REV CODE 636 W/ 250 OVERRIDE (IP): Performed by: EMERGENCY MEDICINE

## 2021-05-13 PROCEDURE — 27818 TREATMENT OF ANKLE FRACTURE: CPT

## 2021-05-13 RX ORDER — ONDANSETRON 4 MG/1
4 TABLET, ORALLY DISINTEGRATING ORAL EVERY 8 HOURS PRN
Qty: 10 TABLET | Refills: 0 | Status: SHIPPED | OUTPATIENT
Start: 2021-05-13 | End: 2021-10-13

## 2021-05-13 RX ORDER — HYDROCODONE BITARTRATE AND ACETAMINOPHEN 5; 325 MG/1; MG/1
1 TABLET ORAL EVERY 6 HOURS PRN
Qty: 15 TABLET | Refills: 0 | Status: SHIPPED | OUTPATIENT
Start: 2021-05-13 | End: 2021-05-17

## 2021-05-13 RX ORDER — PROPOFOL 10 MG/ML
200 INJECTION, EMULSION INTRAVENOUS ONCE
Status: COMPLETED | OUTPATIENT
Start: 2021-05-13 | End: 2021-05-13

## 2021-05-13 RX ADMIN — PROPOFOL 120 MG: 10 INJECTION, EMULSION INTRAVENOUS at 22:46

## 2021-05-13 ASSESSMENT — ENCOUNTER SYMPTOMS
MUSCLE WEAKNESS: 0
DIZZINESS: 0
MYALGIAS: 0
EYE REDNESS: 0
INABILITY TO BEAR WEIGHT: 1
SHORTNESS OF BREATH: 0
NAUSEA: 0
SORE THROAT: 0
NUMBNESS: 0
FEVER: 0
CHILLS: 0
LOSS OF MOTION: 0
LOSS OF SENSATION: 0
VOMITING: 0

## 2021-05-13 ASSESSMENT — LIFESTYLE VARIABLES: DO YOU DRINK ALCOHOL: NO

## 2021-05-13 ASSESSMENT — FIBROSIS 4 INDEX
FIB4 SCORE: 0.87
FIB4 SCORE: 0.87

## 2021-05-14 NOTE — ED NOTES
Pt to [facility]19 in wheelchair from triage, pt in gown and on gurney, call light in reach, chart up for ERP.

## 2021-05-14 NOTE — ED PROVIDER NOTES
"ED Provider Note    CHIEF COMPLAINT  Chief Complaint   Patient presents with   • Ankle Pain     left ankle, seen at urgent care, xray done, dislocation and fracture noted on XRAY. obvious deformity       HPI  Deann Torres is a 51 y.o. female here for evaluation of left ankle pain.  Patient seen and evaluated at urgent care and had an x-ray showing a fracture dislocation.  The patient did not fall or hit her head.  The issue happened just prior to her being urgent care tonight.  Patient states only her left ankle hurts, she does not have any left knee pain, no hip or back pain, no chest pain or shortness of breath.  She states that she took a bad \"step.\"    ROS;  Please see HPI  O/W negative     PAST MEDICAL HISTORY   has a past medical history of Chickenpox, Depression with anxiety (2018), MS (multiple sclerosis) ([facility]) (2018), Sleep apnea (2019), Snoring, and Vitamin D deficiency.    SOCIAL HISTORY  Social History     Tobacco Use   • Smoking status: Never Smoker   • Smokeless tobacco: Never Used   Vaping Use   • Vaping Use: Never used   Substance and Sexual Activity   • Alcohol use: Yes     Comment: 4 drinks/week   • Drug use: No   • Sexual activity: Not Currently       SURGICAL HISTORY  patient denies any surgical history    CURRENT MEDICATIONS  Home Medications     Reviewed by Marcellus Klein R.N. (Registered Nurse) on 05/13/21 at 2110  Med List Status: Not Addressed   Medication Last Dose Status   Cholecalciferol (VITAMIN D3) 5000 units Cap  Active   Diroximel Fumarate (VUMERITY) 231 MG CAPSULE DELAYED RELEASE  Active   sertraline (ZOLOFT) 25 MG tablet  Active                ALLERGIES  No Known Allergies    REVIEW OF SYSTEMS  See HPI for further details. Review of systems as above, otherwise all other systems are negative.     PHYSICAL EXAM  VITAL SIGNS: BP (!) 178/102   Pulse (!) 104   Temp 36.8 °C (98.2 °F) (Temporal)   Resp 16   Ht 1.651 m (5' 5\")   Wt 99.8 kg (220 lb)   LMP 04/13/2021   SpO2 95% "   BMI 36.61 kg/m²     Constitutional: Well developed, well nourished. No acute distress.  HEENT: Normocephalic, atraumatic. MMM  Neck: Supple, Full range of motion   Chest/Pulmonary:  No respiratory distress.  Equal expansion   Musculoskeletal: No deformity, no edema, neurovascular intact.  Left lower extremity; DPP present.  Mild tenderness to the lateral and medial malleolus, cap refill less than 2 seconds.  Nontender left knee.  Neuro: Clear speech, appropriate, cooperative, cranial nerves II-XII grossly intact.  Psych: Normal mood and affect            PROCEDURES   Conscious Sedation Procedure    Indication: ankle dislocation    Consent: I have discussed with the patient and/or the patient representative the indication, alternatives, and the possible risks and/or complications of the planned procedure and the anesthesia methods. The patient and/or patient representative appear to understand and agree to proceed.    Physician Involvement: The attending physician was present and supervising this procedure.    Pre-Sedation Documentation and Exam: I have personally completed a history, physical exam & review of systems for this patient (see notes).  Airway Assessment: normal    Prior History of Anesthesia Complications: none    ASA Classification: Class 1 - A normal healthy patient    Sedation/ Anesthesia Plan: intravenous sedation    Medications Used:  Propofol.  See nursing noted for dose.  intravenously    Monitoring and Safety: The patient was placed on a cardiac monitor and vital signs, pulse oximetry and level of consciousness were continuously evaluated throughout the procedure. The patient was closely monitored until recovery from the medications was complete and the patient had returned to baseline status. Respiratory therapy was on standby at all times during the procedure.    Post-Sedation Vital Signs: Vital signs were reviewed and were stable after the procedure (see flow sheet for vitals)             Post-Sedation Exam: Lungs: clear           Complications: none      ___________________________________      Joint Reduction Procedure    Indication: Joint dislocation    Consent: The patient provided verbal consent for this procedure.    Procedure: The pre-reduction exam showed distal perfusion & neurologic function to be normal. The patient was placed in the appropriate position. Anesthesia/pain control was obtained using conscious sedation -SEE CONSCIOUS SEDATION NOTE FOR DETAILS. Reduction of the left ankle was performed by traction and counter traction. Post reduction films were obtained and revealed satisfactory reduction. A post-reduction exam revealed distal perfusion & neurologic function to be normal. The affected area was immobilized with a posterior ankle splint and crutches were provided for ambulatory support.    The patient tolerated the procedure well.    Complications: None    CT-ANKLE W/O PLUS RECONS LEFT   Final Result         1.  Trimalleolar fracture.   2.  Small bony fracture fragments adjacent to the tip of the lateral malleolus, likely ligamentous avulsion fracture fragments.   3.  Small bony fracture fragment of the anterior aspect of the ankle mortise joint      DX-ANKLE 2- VIEWS LEFT   Final Result         1.  Trimalleolar fracture, status post reduction of posterior dislocation of the ankle mortise joint.            MEDICAL RECORD  I have reviewed patient's medical record and pertinent results are listed.    COURSE & MEDICAL DECISION MAKING  I have reviewed any medical record information, laboratory studies and radiographic results as noted above.    I spoke to Ortho, and Dr. Field is ok with me doing reduction here.      10:52 PM  Pt tolerated the reduction well. I will x ray and ct (request from Emir).  The pt can follow up with him as an outpatient.  Pt has been splinted, and will be given crutches.     I you have had any blood pressure issues while here in the emergency  department, please see your doctor for a further evaluation or work up.    Differential diagnoses include but not limited to: ankle fracture, strain,       This patient presents with ankle fracture and dislocation  .  At this time, I have counseled the patient/family regarding their medications, pain control, and follow up.  They will continue their medications, if any, as prescribed.  They will return immediately for any worsening symptoms and/or any other medical concerns.  They will see their doctor, or contact the doctor provided, in 1-2 days for follow up.       FINAL IMPRESSION  Left ankle fracture and dislocation         Electronically signed by: Ashok Rashid D.O., 5/13/2021 9:39 PM

## 2021-05-14 NOTE — ED NOTES
Conscious Sedation Progress Note:  Staff present: Sj Cornejo RT, Sj RN, Kev ED tech  Equipment present: ET monitoring, ambu-bag, suction, vitals signs monitoring, crash cart  Time Out: 2234 2236 40mg Propofol  2238 20mg Propofol  2239 10mg Propofol  2240 10mg Propofol  2241 10mg Propofol  2242 20mg Propofol  2244 Ankle reduced by ERP, splint applied by ED tech, pt tolerated procedure well.

## 2021-05-14 NOTE — PROGRESS NOTES
Subjective:   Deann Torres is a 51 y.o. female who presents for Ankle Injury (rolled ankle fell in back yard. 1 hour ago, swelling)      Ankle Injury   The incident occurred less than 1 hour ago. The incident occurred at home (Fell in her backyard was unable to to stand up so she called EMS who transported her here to the clinic for evaluation). The injury mechanism was an inversion injury. The pain is present in the left ankle. The quality of the pain is described as aching. The pain is at a severity of 10/10. The pain is severe. The pain has been constant since onset. Associated symptoms include an inability to bear weight. Pertinent negatives include no loss of motion, loss of sensation, muscle weakness or numbness. She reports no foreign bodies present. The symptoms are aggravated by movement, palpation and weight bearing. She has tried acetaminophen, NSAIDs and ice for the symptoms. The treatment provided no relief.       Review of Systems   Constitutional: Negative for chills and fever.   HENT: Negative for sore throat.    Eyes: Negative for redness.   Respiratory: Negative for shortness of breath.    Cardiovascular: Negative for chest pain.   Gastrointestinal: Negative for nausea and vomiting.   Genitourinary: Negative for dysuria.   Musculoskeletal: Positive for joint pain. Negative for myalgias.   Skin: Negative for rash.   Neurological: Negative for dizziness and numbness.       Medications:    • sertraline  • vitamin D3 Caps  • Vumerity Cpdr    Allergies: Patient has no known allergies.    Problem List: Deann Torres does not have any pertinent problems on file.    Surgical History:  No past surgical history on file.    Past Social Hx: Deann Torres  reports that she has never smoked. She has never used smokeless tobacco. She reports current alcohol use. She reports that she does not use drugs.     Past Family Hx:  Deann Torres family history includes Cancer in her father; Thyroid in  "her brother.     Problem list, medications, and allergies reviewed by myself today in Epic.     Objective:     /80 (BP Location: Left arm, Patient Position: Sitting, BP Cuff Size: Adult long)   Pulse (!) 103   Temp 37.1 °C (98.7 °F) (Temporal)   Resp 16   Ht 1.651 m (5' 5\")   Wt 99.8 kg (220 lb)   SpO2 95%   BMI 36.61 kg/m²     Physical Exam  Constitutional:       Appearance: Normal appearance. She is not ill-appearing or toxic-appearing.   HENT:      Head: Normocephalic.      Right Ear: External ear normal.      Left Ear: External ear normal.      Nose: Nose normal.      Mouth/Throat:      Lips: Pink.      Mouth: Mucous membranes are moist.   Eyes:      General: Lids are normal.         Right eye: No discharge.         Left eye: No discharge.   Pulmonary:      Effort: Pulmonary effort is normal. No accessory muscle usage or respiratory distress.   Musculoskeletal:      Cervical back: Full passive range of motion without pain.      Left knee: Normal.      Left lower leg: Normal.      Left ankle: Swelling present. Tenderness present over the lateral malleolus. No medial malleolus or base of 5th metatarsal tenderness. Decreased range of motion.      Left Achilles Tendon: Normal.   Skin:     Coloration: Skin is not pale.   Neurological:      Mental Status: She is alert and oriented to person, place, and time.   Psychiatric:         Mood and Affect: Mood normal.         Thought Content: Thought content normal.         Assessment/Plan:     Diagnosis and associated orders:     1. Injury of left ankle, initial encounter  DX-ANKLE 3+ VIEWS LEFT   2. Closed trimalleolar fracture of left ankle, initial encounter     3. Dislocation of left ankle joint, initial encounter          Comments/MDM:     I independently reviewed the patient's imaging and agree with the interpretation of the radiologist.    1.  Trimalleolar fracture of the ankle with posterior dislocation      At this time, I feel the patient requires a " higher level of care including closer monitoring, and urther evaluation .This has been discussed with the patient and they state agreement and understanding.  I offered the patient an ambulance ride and  the patient is declining at this time. The patient is in no acute distress upon clinic departure and will go directly to ED without delay.              Please note that this dictation was created using voice recognition software. I have made a reasonable attempt to correct obvious errors, but I expect that there are errors of grammar and possibly content that I did not discover before finalizing the note.    This note was electronically signed by Guevara HENRIQUEZ.

## 2021-05-14 NOTE — ED TRIAGE NOTES
Chief Complaint   Patient presents with   • Ankle Pain     left ankle, seen at urgent care, xray done, dislocation and fracture noted on XRAY. obvious deformity     Pt ambulatory to triage for above complaint. Pt reports she stepped off her back step and tripped and fell. Sent by urgent care. Denies any other injuries. +pulses, cap refill 4 seconds, bruising and swelling noted.

## 2021-05-14 NOTE — ED NOTES
"Pt discharged home via wheelchair to [facility], family accompanying. Pt educated on narcotics use and signed consent form. IV discontinued and gauze placed, pt in possession of belongings. Pt provided discharge education and information pertaining to medications and follow up appointments. Pt received copy of discharge instructions and verbalized understanding.     /73   Pulse 93   Temp 36.8 °C (98.2 °F) (Temporal)   Resp 15   Ht 1.651 m (5' 5\")   Wt 99.8 kg (220 lb)   LMP 04/13/2021   SpO2 97%   BMI 36.61 kg/m²   "

## 2021-05-18 ENCOUNTER — HOSPITAL ENCOUNTER (OUTPATIENT)
Facility: MEDICAL CENTER | Age: 51
End: 2021-05-18
Attending: ANESTHESIOLOGY
Payer: COMMERCIAL

## 2021-05-18 LAB
SARS-COV+SARS-COV-2 AG RESP QL IA.RAPID: NOTDETECTED
SPECIMEN SOURCE: NORMAL

## 2021-05-18 PROCEDURE — 87426 SARSCOV CORONAVIRUS AG IA: CPT

## 2021-06-08 DIAGNOSIS — F43.9 STRESS: ICD-10-CM

## 2021-06-08 DIAGNOSIS — F41.9 ANXIETY: ICD-10-CM

## 2021-06-08 RX ORDER — SERTRALINE HYDROCHLORIDE 25 MG/1
50 TABLET, FILM COATED ORAL DAILY
Qty: 60 TABLET | Refills: 0 | OUTPATIENT
Start: 2021-06-08

## 2021-06-09 ENCOUNTER — TELEPHONE (OUTPATIENT)
Dept: NEUROLOGY | Facility: MEDICAL CENTER | Age: 51
End: 2021-06-09

## 2021-06-09 DIAGNOSIS — F43.9 STRESS: ICD-10-CM

## 2021-06-09 DIAGNOSIS — F41.9 ANXIETY: ICD-10-CM

## 2021-06-09 RX ORDER — SERTRALINE HYDROCHLORIDE 25 MG/1
TABLET, FILM COATED ORAL
Qty: 60 TABLET | Refills: 0 | Status: SHIPPED | OUTPATIENT
Start: 2021-06-09 | End: 2021-07-06 | Stop reason: SDUPTHER

## 2021-06-09 NOTE — TELEPHONE ENCOUNTER
[facility] Torres [facility]  Phone Number: [phone]   Hello,   I have been trying to get my sertaline refilled and my primary will not return my message or return my call. Can you write a refill?     Thank you!           Received request via: Pharmacy    Was the patient seen in the last year in this department? Yes    Does the patient have an active prescription (recently filled or refills available) for medication(s) requested? Yes.    Pt has not seen primary in almost 3 yrs, per note in chart

## 2021-07-06 DIAGNOSIS — F41.9 ANXIETY: ICD-10-CM

## 2021-07-06 DIAGNOSIS — F43.9 STRESS: ICD-10-CM

## 2021-07-06 RX ORDER — SERTRALINE HYDROCHLORIDE 25 MG/1
TABLET, FILM COATED ORAL
Qty: 60 TABLET | Refills: 2 | Status: SHIPPED | OUTPATIENT
Start: 2021-07-06 | End: 2021-08-18 | Stop reason: SDUPTHER

## 2021-08-18 ENCOUNTER — TELEPHONE (OUTPATIENT)
Dept: MEDICAL GROUP | Facility: PHYSICIAN GROUP | Age: 51
End: 2021-08-18

## 2021-08-18 DIAGNOSIS — F43.9 STRESS: ICD-10-CM

## 2021-08-18 DIAGNOSIS — F41.9 ANXIETY: ICD-10-CM

## 2021-08-18 RX ORDER — SERTRALINE HYDROCHLORIDE 25 MG/1
TABLET, FILM COATED ORAL
Qty: 60 TABLET | Refills: 2 | Status: SHIPPED | OUTPATIENT
Start: 2021-08-18 | End: 2022-06-27 | Stop reason: SDUPTHER

## 2021-08-18 NOTE — TELEPHONE ENCOUNTER
Phone Number Called: [phone] (home)     Call outcome: Left detailed message for patient. Informed to call back with any additional questions.    Message: Called to inform patient that medication will not be refilled until she is seen by a provider.  Last OV 9/6/18

## 2021-09-27 ENCOUNTER — HOSPITAL ENCOUNTER (OUTPATIENT)
Dept: RADIOLOGY | Facility: MEDICAL CENTER | Age: 51
End: 2021-09-27
Attending: NURSE PRACTITIONER
Payer: COMMERCIAL

## 2021-09-27 DIAGNOSIS — Z12.31 VISIT FOR SCREENING MAMMOGRAM: ICD-10-CM

## 2021-09-27 PROCEDURE — 77063 BREAST TOMOSYNTHESIS BI: CPT

## 2021-10-12 NOTE — PROGRESS NOTES
.[facility]  MULTIPLE SCLEROSIS & NEUROIMMUNOLOGY  FOLLOW-UP VISIT    DISEASE SUMMARY:    MS History:  Diagnosed: 2018  Lumbar puncture:  11/27/2018 14 oliglonal bands  First presentation: Foot drop in 2016(tx by chiroprator) then vertigo  Disease modifying treatment:               Current: [facility]              Previous: N/A  Former or other neurologist:   Last MRI: August 2020      CC: Multiple Sclerosis    INTERVAL HISTORY:  Deann Torres is a 50 y.o. female with a hx of MS.   I last saw Deann in Clinic on 05/11/2021.  At that time her insurance was about to change and she needed a referral to another Neurology office.  I referred her to Sanchez Ott NP at Dr. Hayward office.   Today, Deann was alone, and she provided the following interval history:      On vumerity less stomach issues.  No new symptoms. Stepped off back porch and fell.  Broke left ankle.  ORIF of left trimelleolar ankle with pins placed.      A review of MS-related symptoms was notable for the following:    Fatigue: no  Weakness: no  Numbness: no  Incoordination: left foot clumsiness  Spasms/Spasticity: no  Vision Impairment: no  Eye exam recently all is well  Walking/Balance Problems: no  Neuralgia: no  Bowel Symptoms: no  Bladder Symptoms: no  Heat Sensitivity: yes does become very fatigued in heat  Depression: yes; ok on sertraline  Cognitive/Memory Problems: yes; only occasional  Sexual Dysfunction: no  Anxiety: yes; occasional    MEDICATIONS:  Current Outpatient Medications   Medication Sig   • sertraline (ZOLOFT) 25 MG tablet TAKE 2 TABLETS BY MOUTH EVERY DAY   • cephALEXin (KEFLEX) 500 MG Cap Take 1 Capsule by mouth 3 times a day.   • ondansetron (ZOFRAN ODT) 4 MG TABLET DISPERSIBLE Take 1 tablet by mouth every 8 hours as needed.   • Cholecalciferol (VITAMIN D3) 5000 units Cap Take 1 Cap by mouth every day.     MEDICAL, SOCIAL, AND FAMILY HISTORY:  There is no change in the patient's ROS or PFSH from their previous visit on  .    REVIEW OF SYSTEMS:  A ROS was completed.  Pertinent positives and negatives were included in the HPI, above.  All other systems were reviewed and are negative.    PHYSICAL EXAM:  General/Medical:  - NAD  - hair, skin, nails, and joints were normal  - neck was supple without Lhermitte's phenomenon  - heart rate and rhythm were regular, no carotid bruits appreciated    Neuro:  MENTAL STATUS: awake and alert; no deficits of speech or language; oriented to person, place, and time; affect was appropriate to situation    CRANIAL NERVES:    II: pupils 3/3 to 2/2 without a relative afferent pupillary defect; discs sharp; no red desaturation noted    III/IV/VI: versions intact without nystagmus    V: facial sensation symmetric to light touch    VII: facial expression symmetric    VIII: hearing intact to finger rub    IX/X: palate elevates symmetrically    XI: shoulder shrug symmetric    XII: tongue midline    MOTOR:  - bulk and tone were normal throughout  Upper Extremity Strength  (R/L)    5/5   Elbow flexion 5/5   Elbow extension 5/5   Shoulder abduction 5/5     Lower Extremity Strength  (R/L)   Hip flexion 5/5   Knee extension 5/5   Knee flexion 5/5   Ankle plantarflexion 5/5   Ankle dorsiflexion 5/5     - can walk on toes and heels  -  no pronator drift; no abnormal movements    SENSATION:  - light touch: equal to bilateral sides of body    - Romberg: absent    COORDINATION:  - finger to nose was normal, no ataxia on exam  - finger tapping was rapid and accurate, bilaterally    REFLEXES:  Reflex Right Left   BR 2+ 2+   Biceps 2+ 2+   Triceps 2+ 2+   Patellae 2+ 2+   Achilles 2+ 2+   Toes down down     GAIT:  - narrow base and normal  - heel-raised and toe-raised gait: intact  - tandem gait: intact    QUANTITATIVE SCORES:  Timed 25-foot walk (sec): 4.54 last visit and 5.64 today.   Assistive device: none    REVIEW OF IMAGING STUDIES: Brain, cervical and thoracic all reviewed with no new lesions noted.  These were  reviewed also on 09/11/2020.     REVIEW OF LABORATORY STUDIES:  Results for JOSHUA MICHEL (MRN [id number]) as of 10/13/2021 08:38   Ref. Range 2/5/2021 11:38   WBC Latest Ref Range: 4.8 - 10.8 K/uL 6.6   RBC Latest Ref Range: 4.20 - 5.40 M/uL 5.02   Hemoglobin Latest Ref Range: 12.0 - 16.0 g/dL 15.6   Hematocrit Latest Ref Range: 37.0 - 47.0 % 46.4   MCV Latest Ref Range: 81.4 - 97.8 fL 92.4   MCH Latest Ref Range: 27.0 - 33.0 pg 31.1   MCHC Latest Ref Range: 33.6 - 35.0 g/dL 33.6   RDW Latest Ref Range: 35.9 - 50.0 fL 42.7   Platelet Count Latest Ref Range: 164 - 446 K/uL 229   MPV Latest Ref Range: 9.0 - 12.9 fL 10.8   Neutrophils-Polys Latest Ref Range: 44.00 - 72.00 % 65.00   Neutrophils (Absolute) Latest Ref Range: 2.00 - 7.15 K/uL 4.31   Lymphocytes Latest Ref Range: 22.00 - 41.00 % 19.80 (L)   Lymphs (Absolute) Latest Ref Range: 1.00 - 4.80 K/uL 1.31   Monocytes Latest Ref Range: 0.00 - 13.40 % 13.60 (H)   Monos (Absolute) Latest Ref Range: 0.00 - 0.85 K/uL 0.90 (H)   Eosinophils Latest Ref Range: 0.00 - 6.90 % 0.60   Eos (Absolute) Latest Ref Range: 0.00 - 0.51 K/uL 0.04   Basophils Latest Ref Range: 0.00 - 1.80 % 0.50   Baso (Absolute) Latest Ref Range: 0.00 - 0.12 K/uL 0.03   Immature Granulocytes Latest Ref Range: 0.00 - 0.90 % 0.50   Immature Granulocytes (abs) Latest Ref Range: 0.00 - 0.11 K/uL 0.03   Nucleated RBC Latest Units: /100 WBC 0.00   NRBC (Absolute) Latest Units: K/uL 0.00     Results for JOSHUA MICHEL (MRN [id number]) as of 10/13/2021 08:38   Ref. Range 2/5/2021 11:38   Sodium Latest Ref Range: 135 - 145 mmol/L 139   Potassium Latest Ref Range: 3.6 - 5.5 mmol/L 4.5   Chloride Latest Ref Range: 96 - 112 mmol/L 104   Co2 Latest Ref Range: 20 - 33 mmol/L 25   Anion Gap Latest Ref Range: 7.0 - 16.0  10.0   Glucose Latest Ref Range: 65 - 99 mg/dL 94   Bun Latest Ref Range: 8 - 22 mg/dL 14   Creatinine Latest Ref Range: 0.50 - 1.40 mg/dL 0.67   GFR If  Latest Ref Range: >60  mL/min/1.73 m 2 >60   GFR If Non  Latest Ref Range: >60 mL/min/1.73 m 2 >60   Calcium Latest Ref Range: 8.5 - 10.5 mg/dL 9.3   AST(SGOT) Latest Ref Range: 12 - 45 U/L 23   ALT(SGPT) Latest Ref Range: 2 - 50 U/L 32   Alkaline Phosphatase Latest Ref Range: 30 - 99 U/L 75   Total Bilirubin Latest Ref Range: 0.1 - 1.5 mg/dL 0.4   Albumin Latest Ref Range: 3.2 - 4.9 g/dL 4.2   Total Protein Latest Ref Range: 6.0 - 8.2 g/dL 7.2   Globulin Latest Ref Range: 1.9 - 3.5 g/dL 3.0   A-G Ratio Latest Units: g/dL 1.4   Fasting Status Unknown Non-Fasting         ASSESSMENT:  Deann Torres is a 50 y.o. female with MS.  Here to re-establish care as now [facility] is taking her insurance again.  She saw Marcia Ott NP from Dr. Hayward office recently.  No new imaging or labs to report.  No new MS complaints.  Stable on vumerity and is happy with no GI complaints.  In May had a fall and had a ORIF at the [facility].  Unfortunately that is her weak foot that initially she had foot drop in.  Feels she is recovering well although tends to drag it slightly.  Will refer to [facility].  Verbalizes some work stress and upped her sertralint to 50mg and then went back down.  I reinterated that she really shouldn't adjust her antidepressant on own and she verbalized understanding of that.  She will stay at 50mg.  She is due for DMT and would like to put off MRI's until next August as she has had no flares or new symptoms.        1. MS (multiple sclerosis) ([facility])    Referral to [facility]    - VITAMIN D,25 HYDROXY; Future  - CBC WITH DIFFERENTIAL; Future  - Comp Metabolic Panel; Future  - VITAMIN B12; Future  - TSH; Future    2. Therapeutic drug monitoring    - VITAMIN D,25 HYDROXY; Future  - CBC WITH DIFFERENTIAL; Future  - Comp Metabolic Panel; Future  - VITAMIN B12; Future  - TSH; Future    3. Stress    - VITAMIN B12; Future  - TSH; Future    4. Anxiety  - VITAMIN B12; Future  - TSH; Future  - sertraline (ZOLOFT)  50 MG Tab; Take 1 Tablet by mouth every day.  Dispense: 30 Tablet; Refill: 11      PLAN:  RTC in three months to see how vumerity is working out.    Patient was seen for 35 minutes face to face of which > 50% of appointment time was spent on counseling and coordination of care regarding the above.      - immunotherapy:  Continue vumerity  - symptoms therapy:  Continue sertraline  - labs ordered today  - imaging:  Next August unless new symptoms.    Follow-Up:  - Six months    EDUCATION AND COUNSELIN minutes was spent of which greater than 5% was invloved with education and counseling.  The patient/family educated on diagnosis and prognosis. They understand MS is a chronic progressive disorder for which there is currently no cure. Despite best efforts in management, the condition is likely to progress and carries significant risk for disability including physical and cognitive.   -Effectiveness, indications, and safety profile of available Disease Modifying Agents (DMA’s) reviewed.   -Side effects of DMA’s were discussed, including: flu like symptoms, myalgias, injection site (infection, pain, bleeding), abnormal LFT’s, pancreatitis, weight changes, development of autoantibodies which may decrease effective of the medication, panic/anxiety attacks, abnormal blood counts (increase risk for bleeding, infections, cancer), increased risk for fetal complications (including congenital malformations, developmental/intellectual disability).    -The patient will require frequent follow up and monitoring.   -Laboratory monitoring every 3 months: CBC, CMP, thyroid panel, vitamin D, UA.   -Imaging of entire neuro-axis (brain and spinal cord) with MRI’s w/wo contrast, every six months.   -Patient/family counseled on medication compliance.     Vivian HENRIQUEZ, FNP-C, MSCNP

## 2021-10-13 ENCOUNTER — OFFICE VISIT (OUTPATIENT)
Dept: NEUROLOGY | Facility: MEDICAL CENTER | Age: 51
End: 2021-10-13
Attending: REGISTERED NURSE
Payer: COMMERCIAL

## 2021-10-13 VITALS
OXYGEN SATURATION: 99 % | SYSTOLIC BLOOD PRESSURE: 120 MMHG | DIASTOLIC BLOOD PRESSURE: 84 MMHG | HEIGHT: 65 IN | BODY MASS INDEX: 40.04 KG/M2 | HEART RATE: 77 BPM | TEMPERATURE: 98.7 F | WEIGHT: 240.3 LBS

## 2021-10-13 DIAGNOSIS — F41.9 ANXIETY: ICD-10-CM

## 2021-10-13 DIAGNOSIS — Z51.81 THERAPEUTIC DRUG MONITORING: ICD-10-CM

## 2021-10-13 DIAGNOSIS — F43.9 STRESS: ICD-10-CM

## 2021-10-13 DIAGNOSIS — G35 MS (MULTIPLE SCLEROSIS) (HCC): ICD-10-CM

## 2021-10-13 PROCEDURE — 99215 OFFICE O/P EST HI 40 MIN: CPT | Performed by: REGISTERED NURSE

## 2021-10-13 PROCEDURE — 99211 OFF/OP EST MAY X REQ PHY/QHP: CPT | Performed by: REGISTERED NURSE

## 2021-10-13 RX ORDER — DIROXIMEL FUMARATE 231 MG/1
CAPSULE ORAL
COMMUNITY
End: 2022-05-19 | Stop reason: SDUPTHER

## 2021-10-13 ASSESSMENT — FIBROSIS 4 INDEX: FIB4 SCORE: 0.87

## 2021-11-19 ENCOUNTER — HOSPITAL ENCOUNTER (OUTPATIENT)
Dept: LAB | Facility: MEDICAL CENTER | Age: 51
End: 2021-11-19
Attending: REGISTERED NURSE
Payer: COMMERCIAL

## 2021-11-19 DIAGNOSIS — G35 MS (MULTIPLE SCLEROSIS) (HCC): ICD-10-CM

## 2021-11-19 DIAGNOSIS — F41.9 ANXIETY: ICD-10-CM

## 2021-11-19 DIAGNOSIS — Z51.81 THERAPEUTIC DRUG MONITORING: ICD-10-CM

## 2021-11-19 DIAGNOSIS — F43.9 STRESS: ICD-10-CM

## 2021-11-19 LAB
ALBUMIN SERPL BCP-MCNC: 4.3 G/DL (ref 3.2–4.9)
ALBUMIN/GLOB SERPL: 1.3 G/DL
ALP SERPL-CCNC: 79 U/L (ref 30–99)
ALT SERPL-CCNC: 40 U/L (ref 2–50)
ANION GAP SERPL CALC-SCNC: 12 MMOL/L (ref 7–16)
AST SERPL-CCNC: 24 U/L (ref 12–45)
BASOPHILS # BLD AUTO: 1.1 % (ref 0–1.8)
BASOPHILS # BLD: 0.05 K/UL (ref 0–0.12)
BILIRUB SERPL-MCNC: 0.4 MG/DL (ref 0.1–1.5)
BUN SERPL-MCNC: 15 MG/DL (ref 8–22)
CALCIUM SERPL-MCNC: 9.5 MG/DL (ref 8.5–10.5)
CHLORIDE SERPL-SCNC: 105 MMOL/L (ref 96–112)
CO2 SERPL-SCNC: 24 MMOL/L (ref 20–33)
CREAT SERPL-MCNC: 0.79 MG/DL (ref 0.5–1.4)
EOSINOPHIL # BLD AUTO: 0.07 K/UL (ref 0–0.51)
EOSINOPHIL NFR BLD: 1.5 % (ref 0–6.9)
ERYTHROCYTE [DISTWIDTH] IN BLOOD BY AUTOMATED COUNT: 43.4 FL (ref 35.9–50)
GLOBULIN SER CALC-MCNC: 3.2 G/DL (ref 1.9–3.5)
GLUCOSE SERPL-MCNC: 90 MG/DL (ref 65–99)
HCT VFR BLD AUTO: 48 % (ref 37–47)
HGB BLD-MCNC: 15.6 G/DL (ref 12–16)
IMM GRANULOCYTES # BLD AUTO: 0.02 K/UL (ref 0–0.11)
IMM GRANULOCYTES NFR BLD AUTO: 0.4 % (ref 0–0.9)
LYMPHOCYTES # BLD AUTO: 0.77 K/UL (ref 1–4.8)
LYMPHOCYTES NFR BLD: 16.5 % (ref 22–41)
MCH RBC QN AUTO: 29.3 PG (ref 27–33)
MCHC RBC AUTO-ENTMCNC: 32.5 G/DL (ref 33.6–35)
MCV RBC AUTO: 90.2 FL (ref 81.4–97.8)
MONOCYTES # BLD AUTO: 0.58 K/UL (ref 0–0.85)
MONOCYTES NFR BLD AUTO: 12.4 % (ref 0–13.4)
NEUTROPHILS # BLD AUTO: 3.19 K/UL (ref 2–7.15)
NEUTROPHILS NFR BLD: 68.1 % (ref 44–72)
NRBC # BLD AUTO: 0 K/UL
NRBC BLD-RTO: 0 /100 WBC
PLATELET # BLD AUTO: 172 K/UL (ref 164–446)
PMV BLD AUTO: 11 FL (ref 9–12.9)
POTASSIUM SERPL-SCNC: 5.1 MMOL/L (ref 3.6–5.5)
PROT SERPL-MCNC: 7.5 G/DL (ref 6–8.2)
RBC # BLD AUTO: 5.32 M/UL (ref 4.2–5.4)
SODIUM SERPL-SCNC: 141 MMOL/L (ref 135–145)
TSH SERPL DL<=0.005 MIU/L-ACNC: 2.61 UIU/ML (ref 0.38–5.33)
VIT B12 SERPL-MCNC: 643 PG/ML (ref 211–911)
WBC # BLD AUTO: 4.7 K/UL (ref 4.8–10.8)

## 2021-11-19 PROCEDURE — 84443 ASSAY THYROID STIM HORMONE: CPT

## 2021-11-19 PROCEDURE — 82607 VITAMIN B-12: CPT

## 2021-11-19 PROCEDURE — 36415 COLL VENOUS BLD VENIPUNCTURE: CPT

## 2021-11-19 PROCEDURE — 85025 COMPLETE CBC W/AUTO DIFF WBC: CPT

## 2021-11-19 PROCEDURE — 82306 VITAMIN D 25 HYDROXY: CPT

## 2021-11-19 PROCEDURE — 80053 COMPREHEN METABOLIC PANEL: CPT

## 2021-11-22 LAB — 25(OH)D3 SERPL-MCNC: 34 NG/ML (ref 30–80)

## 2022-04-15 ENCOUNTER — APPOINTMENT (OUTPATIENT)
Dept: NEUROLOGY | Facility: MEDICAL CENTER | Age: 52
End: 2022-04-15
Attending: REGISTERED NURSE
Payer: COMMERCIAL

## 2022-05-19 DIAGNOSIS — G35 MULTIPLE SCLEROSIS (HCC): ICD-10-CM

## 2022-05-20 RX ORDER — DIROXIMEL FUMARATE 231 MG/1
231 CAPSULE ORAL 2 TIMES DAILY
Qty: 120 CAPSULE | Refills: 11 | Status: SHIPPED | OUTPATIENT
Start: 2022-05-20 | End: 2022-06-22 | Stop reason: SDUPTHER

## 2022-05-23 ENCOUNTER — TELEPHONE (OUTPATIENT)
Dept: NEUROLOGY | Facility: MEDICAL CENTER | Age: 52
End: 2022-05-23

## 2022-05-23 NOTE — TELEPHONE ENCOUNTER
Vumerity 231mg Capsule    PA submitted via [facility] (Key: [id number]) awaiting response TAT 24-72 hrs. - 05/23/2022 11:22am

## 2022-05-27 ENCOUNTER — APPOINTMENT (OUTPATIENT)
Dept: NEUROLOGY | Facility: MEDICAL CENTER | Age: 52
End: 2022-05-27
Attending: REGISTERED NURSE
Payer: COMMERCIAL

## 2022-06-03 ENCOUNTER — OFFICE VISIT (OUTPATIENT)
Dept: NEUROLOGY | Facility: MEDICAL CENTER | Age: 52
End: 2022-06-03
Attending: REGISTERED NURSE
Payer: COMMERCIAL

## 2022-06-03 VITALS
WEIGHT: 239.64 LBS | HEART RATE: 77 BPM | SYSTOLIC BLOOD PRESSURE: 124 MMHG | OXYGEN SATURATION: 97 % | DIASTOLIC BLOOD PRESSURE: 82 MMHG | HEIGHT: 65 IN | BODY MASS INDEX: 39.93 KG/M2

## 2022-06-03 DIAGNOSIS — G35 MS (MULTIPLE SCLEROSIS) (HCC): ICD-10-CM

## 2022-06-03 DIAGNOSIS — Z51.81 THERAPEUTIC DRUG MONITORING: ICD-10-CM

## 2022-06-03 PROCEDURE — 99214 OFFICE O/P EST MOD 30 MIN: CPT | Performed by: REGISTERED NURSE

## 2022-06-03 ASSESSMENT — FIBROSIS 4 INDEX: FIB4 SCORE: 1.1

## 2022-06-03 NOTE — PROGRESS NOTES
.[facility]  MULTIPLE SCLEROSIS & NEUROIMMUNOLOGY  FOLLOW-UP VISIT    DISEASE SUMMARY:    MS History:  Diagnosed: 2018  Lumbar puncture:  11/27/2018 14 oliglonal bands  First presentation: Foot drop in 2016 (tx by chiroprator) then vertigo  Disease modifying treatment:               Current: [facility]              Previous: N/A  Former or other neurologist:   Last MRI: August 2020      CC: Multiple Sclerosis    INTERVAL HISTORY:  Deann Torres is a 50 y.o. female with a hx of MS.   I last saw Deann in Clinic on 05/11/2021.  At that time her insurance was about to change and she needed a referral to another Neurology office.  I referred her to Sanchez Ott NP at Dr. Hayward office.   Today, Deann was alone, and she provided the following interval history:      On vumerity less stomach issues but does have some flushing sometimes after administration.  No new symptoms or flares.  Overall happy with job and family.  Mother is doing well.      A review of MS-related symptoms was notable for the following:    Fatigue: no  Weakness: no  Numbness: no  Incoordination: left foot clumsiness resolving with Rona at [facility] therapy  Spasms/Spasticity: no  Vision Impairment: no  Eye exam recently all is well  Walking/Balance Problems: no  Neuralgia: no  Bowel Symptoms: no  Bladder Symptoms: no  Heat Sensitivity: yes does become very fatigued in heat  Depression: yes; ok on sertraline  Cognitive/Memory Problems: yes; only occasional  Sexual Dysfunction: no  Anxiety: yes; occasional    MEDICATIONS:  Current Outpatient Medications   Medication Sig   • Diroximel Fumarate (VUMERITY) 231 MG CAPSULE DELAYED RELEASE Take 231 mg by mouth 2 times a day.   • sertraline (ZOLOFT) 50 MG Tab Take 1 Tablet by mouth every day.   • sertraline (ZOLOFT) 25 MG tablet TAKE 2 TABLETS BY MOUTH EVERY DAY   • Cholecalciferol (VITAMIN D3) 5000 units Cap Take 1 Cap by mouth every day.     MEDICAL, SOCIAL, AND FAMILY HISTORY:  There is no  change in the patient's ROS or PFSH from their previous visit on .    REVIEW OF SYSTEMS:  A ROS was completed.  Pertinent positives and negatives were included in the HPI, above.  All other systems were reviewed and are negative.    PHYSICAL EXAM:  General/Medical:  - NAD  - hair, skin, nails, and joints were normal  - neck was supple without Lhermitte's phenomenon  - heart rate and rhythm were regular, no carotid bruits appreciated    Neuro:  MENTAL STATUS: awake and alert; no deficits of speech or language; oriented to person, place, and time; affect was appropriate to situation    CRANIAL NERVES:    II: pupils 3/3 to 2/2 without a relative afferent pupillary defect; discs sharp; no red desaturation noted    III/IV/VI: versions intact without nystagmus    V: facial sensation symmetric to light touch    VII: facial expression symmetric    VIII: hearing intact to finger rub    IX/X: palate elevates symmetrically    XI: shoulder shrug symmetric    XII: tongue midline    MOTOR:  - bulk and tone were normal throughout  Upper Extremity Strength  (R/L)    5/5   Elbow flexion 5/5   Elbow extension 5/5   Shoulder abduction 5/5     Lower Extremity Strength  (R/L)   Hip flexion 5/5   Knee extension 5/5   Knee flexion 5/5   Ankle plantarflexion 5/5   Ankle dorsiflexion 5/5     - can walk on toes and heels  -  no pronator drift; no abnormal movements    SENSATION:  - light touch: equal to bilateral sides of body    - Romberg: absent    COORDINATION:  - finger to nose was normal, no ataxia on exam  - finger tapping was rapid and accurate, bilaterally    REFLEXES:  Reflex Right Left   BR 2+ 2+   Biceps 2+ 2+   Triceps 2+ 2+   Patellae 2+ 2+   Achilles 2+ 2+   Toes down down     GAIT:  - narrow base and normal  - heel-raised and toe-raised gait: intact  - tandem gait: intact    QUANTITATIVE SCORES:  Timed 25-foot walk (sec): 5.64   Assistive device: none    REVIEW OF IMAGING STUDIES: Brain, cervical and thoracic all reviewed  with no new lesions noted.  These were reviewed also on 09/11/2020.     REVIEW OF LABORATORY STUDIES:  Results for JOSHUA MICHEL (MRN [id number]) as of 10/13/2021 08:38   Ref. Range 2/5/2021 11:38   WBC Latest Ref Range: 4.8 - 10.8 K/uL 6.6   RBC Latest Ref Range: 4.20 - 5.40 M/uL 5.02   Hemoglobin Latest Ref Range: 12.0 - 16.0 g/dL 15.6   Hematocrit Latest Ref Range: 37.0 - 47.0 % 46.4   MCV Latest Ref Range: 81.4 - 97.8 fL 92.4   MCH Latest Ref Range: 27.0 - 33.0 pg 31.1   MCHC Latest Ref Range: 33.6 - 35.0 g/dL 33.6   RDW Latest Ref Range: 35.9 - 50.0 fL 42.7   Platelet Count Latest Ref Range: 164 - 446 K/uL 229   MPV Latest Ref Range: 9.0 - 12.9 fL 10.8   Neutrophils-Polys Latest Ref Range: 44.00 - 72.00 % 65.00   Neutrophils (Absolute) Latest Ref Range: 2.00 - 7.15 K/uL 4.31   Lymphocytes Latest Ref Range: 22.00 - 41.00 % 19.80 (L)   Lymphs (Absolute) Latest Ref Range: 1.00 - 4.80 K/uL 1.31   Monocytes Latest Ref Range: 0.00 - 13.40 % 13.60 (H)   Monos (Absolute) Latest Ref Range: 0.00 - 0.85 K/uL 0.90 (H)   Eosinophils Latest Ref Range: 0.00 - 6.90 % 0.60   Eos (Absolute) Latest Ref Range: 0.00 - 0.51 K/uL 0.04   Basophils Latest Ref Range: 0.00 - 1.80 % 0.50   Baso (Absolute) Latest Ref Range: 0.00 - 0.12 K/uL 0.03   Immature Granulocytes Latest Ref Range: 0.00 - 0.90 % 0.50   Immature Granulocytes (abs) Latest Ref Range: 0.00 - 0.11 K/uL 0.03   Nucleated RBC Latest Units: /100 WBC 0.00   NRBC (Absolute) Latest Units: K/uL 0.00     Results for JOSHUA MICHEL (MRN [id number]) as of 10/13/2021 08:38   Ref. Range 2/5/2021 11:38   Sodium Latest Ref Range: 135 - 145 mmol/L 139   Potassium Latest Ref Range: 3.6 - 5.5 mmol/L 4.5   Chloride Latest Ref Range: 96 - 112 mmol/L 104   Co2 Latest Ref Range: 20 - 33 mmol/L 25   Anion Gap Latest Ref Range: 7.0 - 16.0  10.0   Glucose Latest Ref Range: 65 - 99 mg/dL 94   Bun Latest Ref Range: 8 - 22 mg/dL 14   Creatinine Latest Ref Range: 0.50 - 1.40 mg/dL 0.67   GFR If   Latest Ref Range: >60 mL/min/1.73 m 2 >60   GFR If Non  Latest Ref Range: >60 mL/min/1.73 m 2 >60   Calcium Latest Ref Range: 8.5 - 10.5 mg/dL 9.3   AST(SGOT) Latest Ref Range: 12 - 45 U/L 23   ALT(SGPT) Latest Ref Range: 2 - 50 U/L 32   Alkaline Phosphatase Latest Ref Range: 30 - 99 U/L 75   Total Bilirubin Latest Ref Range: 0.1 - 1.5 mg/dL 0.4   Albumin Latest Ref Range: 3.2 - 4.9 g/dL 4.2   Total Protein Latest Ref Range: 6.0 - 8.2 g/dL 7.2   Globulin Latest Ref Range: 1.9 - 3.5 g/dL 3.0   A-G Ratio Latest Units: g/dL 1.4   Fasting Status Unknown Non-Fasting         ASSESSMENT:  Deann Torres is a 50 y.o. female with MS.  Here to re-establish care as now [facility] is taking her insurance again.  She saw Marcia Ott NP from Dr. Hayward office recently.  No new imaging or labs to report.  No new MS complaints.  Stable on vumerity and is happy with no GI complaints.  In May had a fall and had a ORIF at the [facility].  Unfortunately that is her weak foot that initially she had foot drop in.  Feels she is recovering well although tends to drag it slightly.  Will refer to [facility].  Verbalizes some work stress and upped her sertralint to 50mg and then went back down.  I reinterated that she really shouldn't adjust her antidepressant on own and she verbalized understanding of that.  She will stay at 50mg.  She is due for DMT and would like to put off MRI's until next August as she has had no flares or new symptoms.        1. MS (multiple sclerosis) (Formerly [facility])      PLAN:  RTC in three months after MRI's with Dr. Bloch      1. Therapeutic drug monitoring    - VITAMIN D,25 HYDROXY; Future  - VITAMIN B12; Future  - CBC WITH DIFFERENTIAL; Future  - Comp Metabolic Panel; Future  - MR-BRAIN-WITH & W/O; Future  - MR-CERVICAL SPINE-WITH & W/O; Future  - MR-THORACIC SPINE-WITH & W/O; Future    2. MS (multiple sclerosis) ([facility])  - VITAMIN D,25 HYDROXY; Future  - VITAMIN B12; Future  - CBC WITH  DIFFERENTIAL; Future  - Comp Metabolic Panel; Future  - MR-BRAIN-WITH & W/O; Future  - MR-CERVICAL SPINE-WITH & W/O; Future  - MR-THORACIC SPINE-WITH & W/O; Future        - immunotherapy:  Continue vumerity  - symptoms therapy:  Continue sertraline  - labs ordered today  - imaging:  Ordered today        EDUCATION AND COUNSELIN minutes was spent of which greater than 5% was invloved with education and counseling.  The patient/family educated on diagnosis and prognosis. They understand MS is a chronic progressive disorder for which there is currently no cure. Despite best efforts in management, the condition is likely to progress and carries significant risk for disability including physical and cognitive.   -Effectiveness, indications, and safety profile of available Disease Modifying Agents (DMA’s) reviewed.   -Side effects of DMA’s were discussed, including: flu like symptoms, myalgias, injection site (infection, pain, bleeding), abnormal LFT’s, pancreatitis, weight changes, development of autoantibodies which may decrease effective of the medication, panic/anxiety attacks, abnormal blood counts (increase risk for bleeding, infections, cancer), increased risk for fetal complications (including congenital malformations, developmental/intellectual disability).    -The patient will require frequent follow up and monitoring.   -Laboratory monitoring every 3 months: CBC, CMP, thyroid panel, vitamin D, UA.   -Imaging of entire neuro-axis (brain and spinal cord) with MRI’s w/wo contrast, every six months.   -Patient/family counseled on medication compliance.     Vivian HENRIQUEZ, FNP-C, MSCNP

## 2022-06-13 ENCOUNTER — PATIENT MESSAGE (OUTPATIENT)
Dept: NEUROLOGY | Facility: MEDICAL CENTER | Age: 52
End: 2022-06-13
Payer: COMMERCIAL

## 2022-06-16 ENCOUNTER — TELEPHONE (OUTPATIENT)
Dept: NEUROLOGY | Facility: MEDICAL CENTER | Age: 52
End: 2022-06-16
Payer: COMMERCIAL

## 2022-06-22 ENCOUNTER — TELEPHONE (OUTPATIENT)
Dept: NEUROLOGY | Facility: MEDICAL CENTER | Age: 52
End: 2022-06-22

## 2022-06-22 DIAGNOSIS — G35 MULTIPLE SCLEROSIS (HCC): ICD-10-CM

## 2022-06-22 RX ORDER — DIROXIMEL FUMARATE 231 MG/1
231 CAPSULE ORAL 2 TIMES DAILY
Qty: 120 CAPSULE | Refills: 11 | Status: SHIPPED | OUTPATIENT
Start: 2022-06-22 | End: 2022-06-27 | Stop reason: SDUPTHER

## 2022-06-22 NOTE — TELEPHONE ENCOUNTER
Vumerity 231mg CPDR    PA expires 05/23/2023 - PRODUCT/SERVICE NOT APPROPRIATE FOR THIS LOCATION will have liaison Tiffanie Harwdick release to patient pharmacy of record. - 06/22/2022 1:18pm

## 2022-06-27 DIAGNOSIS — F41.9 ANXIETY: ICD-10-CM

## 2022-06-27 DIAGNOSIS — F43.9 STRESS: ICD-10-CM

## 2022-06-27 DIAGNOSIS — G35 MULTIPLE SCLEROSIS (HCC): ICD-10-CM

## 2022-06-27 RX ORDER — DIROXIMEL FUMARATE 231 MG/1
231 CAPSULE ORAL 2 TIMES DAILY
Qty: 120 CAPSULE | Refills: 11 | Status: SHIPPED | OUTPATIENT
Start: 2022-06-27 | End: 2022-06-28 | Stop reason: SDUPTHER

## 2022-06-27 RX ORDER — SERTRALINE HYDROCHLORIDE 25 MG/1
TABLET, FILM COATED ORAL
Qty: 60 TABLET | Refills: 2 | Status: SHIPPED | OUTPATIENT
Start: 2022-06-27 | End: 2023-07-25

## 2022-06-28 DIAGNOSIS — G35 MULTIPLE SCLEROSIS (HCC): ICD-10-CM

## 2022-06-28 RX ORDER — DIROXIMEL FUMARATE 231 MG/1
231 CAPSULE ORAL 2 TIMES DAILY
Qty: 120 CAPSULE | Refills: 11 | Status: SHIPPED | OUTPATIENT
Start: 2022-06-28 | End: 2022-06-30 | Stop reason: SDUPTHER

## 2022-06-30 DIAGNOSIS — G35 MULTIPLE SCLEROSIS (HCC): ICD-10-CM

## 2022-06-30 RX ORDER — DIROXIMEL FUMARATE 231 MG/1
231 CAPSULE ORAL 2 TIMES DAILY
Qty: 120 CAPSULE | Refills: 11 | Status: SHIPPED | OUTPATIENT
Start: 2022-06-30 | End: 2023-07-25 | Stop reason: SDUPTHER

## 2022-07-08 ENCOUNTER — APPOINTMENT (OUTPATIENT)
Dept: RADIOLOGY | Facility: MEDICAL CENTER | Age: 52
End: 2022-07-08
Attending: REGISTERED NURSE
Payer: COMMERCIAL

## 2022-07-08 DIAGNOSIS — Z51.81 THERAPEUTIC DRUG MONITORING: ICD-10-CM

## 2022-07-08 DIAGNOSIS — G35 MS (MULTIPLE SCLEROSIS) (HCC): ICD-10-CM

## 2022-07-08 PROCEDURE — 72156 MRI NECK SPINE W/O & W/DYE: CPT

## 2022-07-08 PROCEDURE — 70553 MRI BRAIN STEM W/O & W/DYE: CPT

## 2022-07-08 PROCEDURE — 700117 HCHG RX CONTRAST REV CODE 255: Performed by: REGISTERED NURSE

## 2022-07-08 PROCEDURE — 72157 MRI CHEST SPINE W/O & W/DYE: CPT

## 2022-07-08 PROCEDURE — A9576 INJ PROHANCE MULTIPACK: HCPCS | Performed by: REGISTERED NURSE

## 2022-07-08 RX ADMIN — GADOTERIDOL 20 ML: 279.3 INJECTION, SOLUTION INTRAVENOUS at 13:59

## 2022-07-20 NOTE — DISCHARGE INSTRUCTIONS
"[facility]                Progress Note    Subjective:       Patient ID: John E Sandifer is a 80 y.o. male.    Chief Complaint: Wound Check    Follow up wound care visit. Patient ambulated to exam room unaided. No c/o pain at present. Denies fever or chills. Dressing intact with no drainage. New wound noted to Right Proximal Anterior Lower Leg with a small marked area of dried blood to outer Tubigrip, patient states "it may be from my puppy jumping on my leg". Right Lateral Lower Leg wound measuring smaller in length, width and depth.    CAMMIE study done today, results were pending at end of patient's exam. Wound care done per order. RTC in 1 week.    This is an established patient in wound care.   Patient presents in the office today for evaluation of the chronic wound.  Updated HPI is noted below.    The periwound appears non-erythematous, no maceration noted, edematous    The wound appears wound healing; limited to skin breakdown. No drainage. No odor.     Patient denies fever, chills    Patients reports wound pain    Lymphedema status is contributory to wound status    Pain at the site of the wound is aching    Contributing factors to current wound state include numerous comorbid conditions, Hypertension, Lymphedema      Review of Systems   Constitutional: Negative for activity change, appetite change and fever.   HENT: Negative for congestion.    Respiratory: Negative for shortness of breath and wheezing.    Cardiovascular: Negative for chest pain and leg swelling.   Gastrointestinal: Negative for abdominal pain, nausea and vomiting.   Skin: Positive for wound.   Neurological: Negative for dizziness and headaches.   Psychiatric/Behavioral: The patient is not nervous/anxious.          Objective:        Physical Exam  Vitals and nursing note reviewed.   Constitutional:       Appearance: He is well-developed.   HENT:      Head: Normocephalic and atraumatic.   Eyes:      " Earache, Adult  An earache, or ear pain, can be caused by many things, including:  · An infection.  · Ear wax buildup.  · Ear pressure.  · Something in the ear that should not be there (foreign body).  · A sore throat.  · Tooth problems.  · Jaw problems.  Treatment of the earache will depend on the cause. If the cause is not clear or cannot be determined, you may need to watch your symptoms until your earache goes away or until a cause is found.  Follow these instructions at home:  Pay attention to any changes in your symptoms. Take these actions to help with your pain:  · Take or apply over-the-counter and prescription medicines only as told by your health care provider.  · If you were prescribed an antibiotic medicine, use it as told by your health care provider. Do not stop using the antibiotic even if you start to feel better.  · Do not put anything in your ear other than medicine that is prescribed by your health care provider.  · If directed, apply heat to the affected area as often as told by your health care provider. Use the heat source that your health care provider recommends, such as a moist heat pack or a heating pad.  ¨ Place a towel between your skin and the heat source.  ¨ Leave the heat on for 20-30 minutes.  ¨ Remove the heat if your skin turns bright red. This is especially important if you are unable to feel pain, heat, or cold. You may have a greater risk of getting burned.  · If directed, put ice on the ear:  ¨ Put ice in a plastic bag.  ¨ Place a towel between your skin and the bag.  ¨ Leave the ice on for 20 minutes, 2-3 times a day.  · Try resting in an upright position instead of lying down. This may help to reduce pressure in your ear and relieve pain.  · Chew gum if it helps to relieve your ear pain.  · Treat any allergies as told by your health care provider.  · Keep all follow-up visits as told by your health care provider. This is important.  Contact a health care provider if:  · Your  Conjunctiva/sclera: Conjunctivae normal.   Pulmonary:      Effort: Pulmonary effort is normal.   Abdominal:      General: There is no distension.      Palpations: Abdomen is soft.   Musculoskeletal:         General: Normal range of motion.      Cervical back: Normal range of motion and neck supple.   Skin:     Comments: See wound description   Neurological:      Mental Status: He is alert and oriented to person, place, and time.   Psychiatric:         Behavior: Behavior normal.         Vitals:    07/20/22 0918   BP: (!) 170/77   Pulse: 64   Temp: 97.5 °F (36.4 °C)       Assessment:           ICD-10-CM ICD-9-CM   1. Stasis ulcer  I83.009 454.0    L97.909    2. Venous stasis ulcer of other part of right lower leg limited to breakdown of skin without varicose veins  I87.2 459.81    L97.811 707.15   3. PVD (peripheral vascular disease)  I73.9 443.9            Altered Skin Integrity 07/20/22 0926 Right anterior;lower;proximal Leg Abrasion(s) Partial thickness tissue loss. Shallow open ulcer with a red or pink wound bed, without slough. Intact or Open/Ruptured Serum-filled blister. (Active)   07/20/22 0926   Altered Skin Integrity Present on Admission: yes   Side: Right   Orientation: anterior;lower;proximal   Location: Leg   Wound Number:    Is this injury device related?:    Primary Wound Type: Abrasion(s)   Description of Altered Skin Integrity: Partial thickness tissue loss. Shallow open ulcer with a red or pink wound bed, without slough. Intact or Open/Ruptured Serum-filled blister.   Removal Indication and Assessment:    Wound Outcome:    (Retired) Wound Length (cm):    (Retired) Wound Width (cm):    (Retired) Depth (cm):    Wound Description (Comments):    Removal Indications:    Wound Image   07/20/22 0900   Description of Altered Skin Integrity Partial thickness tissue loss. Shallow open ulcer with a red or pink wound bed, without slough. Intact or Open/Ruptured Serum-filled blister. 07/20/22 0900   Dressing  pain does not improve within 2 days.  · Your earache gets worse.  · You have new symptoms.  · You have a fever.  Get help right away if:  · You have a severe headache.  · You have a stiff neck.  · You have trouble swallowing.  · You have redness or swelling behind your ear.  · You have fluid or blood coming from your ear.  · You have hearing loss.  · You feel dizzy.  This information is not intended to replace advice given to you by your health care provider. Make sure you discuss any questions you have with your health care provider.  Document Released: 08/04/2005 Document Revised: 08/15/2017 Document Reviewed: 06/12/2017  [employer] Interactive Patient Education © 2017 [employer]    Tinnitus  Tinnitus refers to hearing a sound when there is no actual source for that sound. This is often described as ringing in the ears. However, people with this condition may hear a variety of noises. A person may hear the sound in one ear or in both ears.  The sounds of tinnitus can be soft, loud, or somewhere in between. Tinnitus can last for a few seconds or can be constant for days. It may go away without treatment and come back at various times. When tinnitus is constant or happens often, it can lead to other problems, such as trouble sleeping and trouble concentrating.  Almost everyone experiences tinnitus at some point. Tinnitus that is long-lasting (chronic) or comes back often is a problem that may require medical attention.  What are the causes?  The cause of tinnitus is often not known. In some cases, it can result from other problems or conditions, including:  · Exposure to loud noises from machinery, music, or other sources.  · Hearing loss.  · Ear or sinus infections.  · Earwax buildup.  · A foreign object in the ear.  · Use of certain medicines.  · Use of alcohol and caffeine.  · High blood pressure.  · Heart diseases.  · Anemia.  · Allergies.  · Meniere disease.  · Thyroid problems.  · Tumors.  · An enlarged part  Appearance Dried drainage 07/20/22 0900   Drainage Amount Scant 07/20/22 0900   Drainage Characteristics/Odor Sanguineous;No odor;Bleeding controlled 07/20/22 0900   Appearance Red;Moist 07/20/22 0900   Tissue loss description Partial thickness 07/20/22 0900   Black (%), Wound Tissue Color 0 % 07/20/22 0900   Red (%), Wound Tissue Color 100 % 07/20/22 0900   Yellow (%), Wound Tissue Color 0 % 07/20/22 0900   Periwound Area Dry;Intact 07/20/22 0900   Wound Edges Open 07/20/22 0900   Wound Length (cm) 1.1 cm 07/20/22 0900   Wound Width (cm) 1.6 cm 07/20/22 0900   Wound Depth (cm) 0.1 cm 07/20/22 0900   Wound Volume (cm^3) 0.176 cm^3 07/20/22 0900   Wound Surface Area (cm^2) 1.76 cm^2 07/20/22 0900   Tunneling (depth (cm)/location) 0 07/20/22 0900   Undermining (depth (cm)/location) 0 07/20/22 0900   Care Cleansed with:;Antimicrobial agent;Sterile normal saline 07/20/22 0900   Dressing Applied 07/20/22 0900   Periwound Care Skin barrier film applied;Absorptive dressing applied 07/20/22 0900   Compression Tubular elasticized bandage 07/20/22 0900   Dressing Change Due 07/27/22 07/20/22 0900            Wound 05/04/22 0921 Abrasion(s) Right lower;lateral;anterior Leg (Active)   05/04/22 0921    Pre-existing: Yes   Primary Wound Type: Abrasion(s)   Side: Right   Orientation: lower;lateral;anterior   Location: Leg   Wound Number:    Ankle-Brachial Index:    Pulses:    Removal Indication and Assessment:    Wound Outcome:    (Retired) Wound Type:    (Retired) Wound Length (cm):    (Retired) Wound Width (cm):    (Retired) Depth (cm):    Wound Description (Comments):    Removal Indications:    Wound Image   07/20/22 0900   Wound WDL ex 07/20/22 0900   Dressing Appearance Intact;Dry 07/20/22 0900   Drainage Amount None 07/20/22 0900   Appearance Pink;Moist 07/20/22 0900   Tissue loss description Partial thickness 07/20/22 0900   Black (%), Wound Tissue Color 0 % 07/20/22 0900   Red (%), Wound Tissue Color 100 % 07/20/22 0900  of a weakened blood vessel (aneurysm).  What are the signs or symptoms?  The main symptom of tinnitus is hearing a sound when there is no source for that sound. It may sound like:  · Buzzing.  · Roaring.  · Ringing.  · Blowing air, similar to the sound heard when you listen to a seashell.  · Hissing.  · Whistling.  · Sizzling.  · Humming.  · Running water.  · A sustained musical note.  How is this diagnosed?  Tinnitus is diagnosed based on your symptoms. Your health care provider will do a physical exam. A comprehensive hearing exam (audiologic exam) will be done if your tinnitus:  · Affects only one ear (unilateral).  · Causes hearing difficulties.  · Lasts 6 months or longer.  You may also need to see a health care provider who specializes in hearing disorders (audiologist). You may be asked to complete a questionnaire to determine the severity of your tinnitus. Tests may be done to help determine the cause and to rule out other conditions. These can include:  · Imaging studies of your head and brain, such as:  ¨ A CT scan.  ¨ An MRI.  · An imaging study of your blood vessels (angiogram).  How is this treated?  Treating an underlying medical condition can sometimes make tinnitus go away. If your tinnitus continues, other treatments may include:  · Medicines, such as certain antidepressants or sleeping aids.  · Sound generators to mask the tinnitus. These include:  ¨ Tabletop sound machines that play relaxing sounds to help you fall asleep.  ¨ Wearable devices that fit in your ear and play sounds or music.  ¨ A small device that uses headphones to deliver a signal embedded in music (acoustic neural stimulation). In time, this may change the pathways of your brain and make you less sensitive to tinnitus. This device is used for very severe cases when no other treatment is working.  · Therapy and counseling to help you manage the stress of living with tinnitus.  · Using hearing aids or cochlear implants, if your    Yellow (%), Wound Tissue Color 0 % 07/20/22 0900   Periwound Area Dry;[place] 07/20/22 0900   Wound Edges Defined 07/20/22 0900   Wound Length (cm) 0.1 cm 07/20/22 0900   Wound Width (cm) 0.1 cm 07/20/22 0900   Wound Depth (cm) 0.1 cm 07/20/22 0900   Wound Volume (cm^3) 0.001 cm^3 07/20/22 0900   Wound Surface Area (cm^2) 0.01 cm^2 07/20/22 0900   Tunneling (depth (cm)/location) 0 07/20/22 0900   Undermining (depth (cm)/location) 0 07/20/22 0900   Care Cleansed with:;Antimicrobial agent;Sterile normal saline 07/20/22 0900   Dressing Changed 07/20/22 0900   Periwound Care Skin barrier film applied 07/20/22 0900   Compression Tubular elasticized bandage 07/20/22 0900   Dressing Change Due 07/27/22 07/20/22 0900           Plan:            1. Debridement not needed and Not Done today.   2. Recommend off-loading  3. Increase protein   4. CAMMIE shows left is non-compressible and right CAMMIE is within normal limits. Consider higher compression if wound does not improve   5. Will recommend Patient follow-up with vascular or interventional cardiology upon discharge   6. Keep dressing clean and intact  7. Continue with wound care orders and plan as noted in orders.   8. Continue to follow current medication regimen as per pcp   9. Call for any questions / concerns.           Orders Placed This Encounter   Procedures    Change dressing     Dressing change frequency weekly   Remove old dressing   Cleanse or irrigate with: saline   Primary wound dressing with: Endoform to both wound beds  Secondary dressing:Cavilon and Mepilex border gauze x 2; Mepilex foam to shin (for protection)   Compression: Single Layer Tubigrip in E, medium gradient (39cm calf)        Follow up in about 1 week (around 7/27/2022) for wound care.        tinnitus is related to hearing loss.  Follow these instructions at home:  · When possible, avoid being in loud places and being exposed to loud sounds.  · Wear hearing protection, such as earplugs, when you are exposed to loud noises.  · Do not take stimulants, such as nicotine, alcohol, or caffeine.  · Practice techniques for reducing stress, such as meditation, yoga, or deep breathing.  · Use a white noise machine, a humidifier, or other devices to mask the sound of tinnitus.  · Sleep with your head slightly raised. This may reduce the impact of tinnitus.  · Try to get plenty of rest each night.  Contact a health care provider if:  · You have tinnitus in just one ear.  · Your tinnitus continues for 3 weeks or longer without stopping.  · Home care measures are not helping.  · You have tinnitus after a head injury.  · You have tinnitus along with any of the following:  ¨ Dizziness.  ¨ Loss of balance.  ¨ Nausea and vomiting.  This information is not intended to replace advice given to you by your health care provider. Make sure you discuss any questions you have with your health care provider.  Document Released: 12/18/2006 Document Revised: 08/20/2017 Document Reviewed: 05/20/2015  [employer] Interactive Patient Education © 2017 [employer]

## 2022-08-12 ENCOUNTER — HOSPITAL ENCOUNTER (OUTPATIENT)
Dept: LAB | Facility: MEDICAL CENTER | Age: 52
End: 2022-08-12
Attending: REGISTERED NURSE
Payer: COMMERCIAL

## 2022-08-12 DIAGNOSIS — G35 MS (MULTIPLE SCLEROSIS) (HCC): ICD-10-CM

## 2022-08-12 DIAGNOSIS — Z51.81 THERAPEUTIC DRUG MONITORING: ICD-10-CM

## 2022-08-12 LAB
25(OH)D3 SERPL-MCNC: 44 NG/ML (ref 30–100)
ALBUMIN SERPL BCP-MCNC: 4.7 G/DL (ref 3.2–4.9)
ALBUMIN/GLOB SERPL: 1.7 G/DL
ALP SERPL-CCNC: 86 U/L (ref 30–99)
ALT SERPL-CCNC: 55 U/L (ref 2–50)
ANION GAP SERPL CALC-SCNC: 12 MMOL/L (ref 7–16)
AST SERPL-CCNC: 33 U/L (ref 12–45)
BASOPHILS # BLD AUTO: 0.5 % (ref 0–1.8)
BASOPHILS # BLD: 0.02 K/UL (ref 0–0.12)
BILIRUB SERPL-MCNC: 0.4 MG/DL (ref 0.1–1.5)
BUN SERPL-MCNC: 11 MG/DL (ref 8–22)
CALCIUM SERPL-MCNC: 9.3 MG/DL (ref 8.5–10.5)
CHLORIDE SERPL-SCNC: 103 MMOL/L (ref 96–112)
CO2 SERPL-SCNC: 22 MMOL/L (ref 20–33)
CREAT SERPL-MCNC: 0.7 MG/DL (ref 0.5–1.4)
EOSINOPHIL # BLD AUTO: 0.06 K/UL (ref 0–0.51)
EOSINOPHIL NFR BLD: 1.4 % (ref 0–6.9)
ERYTHROCYTE [DISTWIDTH] IN BLOOD BY AUTOMATED COUNT: 44.1 FL (ref 35.9–50)
GFR SERPLBLD CREATININE-BSD FMLA CKD-EPI: 105 ML/MIN/1.73 M 2
GLOBULIN SER CALC-MCNC: 2.7 G/DL (ref 1.9–3.5)
GLUCOSE SERPL-MCNC: 89 MG/DL (ref 65–99)
HCT VFR BLD AUTO: 47.9 % (ref 37–47)
HGB BLD-MCNC: 16.4 G/DL (ref 12–16)
IMM GRANULOCYTES # BLD AUTO: 0.03 K/UL (ref 0–0.11)
IMM GRANULOCYTES NFR BLD AUTO: 0.7 % (ref 0–0.9)
LYMPHOCYTES # BLD AUTO: 0.65 K/UL (ref 1–4.8)
LYMPHOCYTES NFR BLD: 15.1 % (ref 22–41)
MCH RBC QN AUTO: 30.7 PG (ref 27–33)
MCHC RBC AUTO-ENTMCNC: 34.2 G/DL (ref 33.6–35)
MCV RBC AUTO: 89.7 FL (ref 81.4–97.8)
MONOCYTES # BLD AUTO: 0.48 K/UL (ref 0–0.85)
MONOCYTES NFR BLD AUTO: 11.1 % (ref 0–13.4)
NEUTROPHILS # BLD AUTO: 3.07 K/UL (ref 2–7.15)
NEUTROPHILS NFR BLD: 71.2 % (ref 44–72)
NRBC # BLD AUTO: 0 K/UL
NRBC BLD-RTO: 0 /100 WBC
PLATELET # BLD AUTO: 234 K/UL (ref 164–446)
PMV BLD AUTO: 11.2 FL (ref 9–12.9)
POTASSIUM SERPL-SCNC: 4.4 MMOL/L (ref 3.6–5.5)
PROT SERPL-MCNC: 7.4 G/DL (ref 6–8.2)
RBC # BLD AUTO: 5.34 M/UL (ref 4.2–5.4)
SODIUM SERPL-SCNC: 137 MMOL/L (ref 135–145)
VIT B12 SERPL-MCNC: 532 PG/ML (ref 211–911)
WBC # BLD AUTO: 4.3 K/UL (ref 4.8–10.8)

## 2022-08-12 PROCEDURE — 80053 COMPREHEN METABOLIC PANEL: CPT

## 2022-08-12 PROCEDURE — 82306 VITAMIN D 25 HYDROXY: CPT

## 2022-08-12 PROCEDURE — 82607 VITAMIN B-12: CPT

## 2022-08-12 PROCEDURE — 85025 COMPLETE CBC W/AUTO DIFF WBC: CPT

## 2022-08-12 PROCEDURE — 36415 COLL VENOUS BLD VENIPUNCTURE: CPT

## 2022-09-30 ENCOUNTER — APPOINTMENT (OUTPATIENT)
Dept: NEUROLOGY | Facility: MEDICAL CENTER | Age: 52
End: 2022-09-30
Attending: REGISTERED NURSE
Payer: COMMERCIAL

## 2022-12-28 ENCOUNTER — OFFICE VISIT (OUTPATIENT)
Dept: URGENT CARE | Facility: PHYSICIAN GROUP | Age: 52
End: 2022-12-28
Payer: COMMERCIAL

## 2022-12-28 VITALS
RESPIRATION RATE: 16 BRPM | HEART RATE: 112 BPM | SYSTOLIC BLOOD PRESSURE: 114 MMHG | HEIGHT: 65 IN | OXYGEN SATURATION: 91 % | BODY MASS INDEX: 40.79 KG/M2 | WEIGHT: 244.8 LBS | DIASTOLIC BLOOD PRESSURE: 62 MMHG | TEMPERATURE: 97.7 F

## 2022-12-28 DIAGNOSIS — J02.0 STREP PHARYNGITIS: ICD-10-CM

## 2022-12-28 DIAGNOSIS — R00.0 TACHYCARDIA: ICD-10-CM

## 2022-12-28 DIAGNOSIS — J02.9 SORE THROAT: ICD-10-CM

## 2022-12-28 LAB
INT CON NEG: NEGATIVE
INT CON POS: POSITIVE
S PYO AG THROAT QL: POSITIVE

## 2022-12-28 PROCEDURE — 99214 OFFICE O/P EST MOD 30 MIN: CPT | Performed by: PHYSICIAN ASSISTANT

## 2022-12-28 PROCEDURE — 87880 STREP A ASSAY W/OPTIC: CPT | Performed by: PHYSICIAN ASSISTANT

## 2022-12-28 RX ORDER — CEPHALEXIN 500 MG/1
500 CAPSULE ORAL 2 TIMES DAILY
Qty: 20 CAPSULE | Refills: 0 | Status: SHIPPED | OUTPATIENT
Start: 2022-12-28 | End: 2023-01-07

## 2022-12-28 ASSESSMENT — FIBROSIS 4 INDEX: FIB4 SCORE: 0.95

## 2022-12-28 NOTE — PROGRESS NOTES
"Subjective:   Deann Torres is a 52 y.o. female who presents for Pharyngitis (Since Monday, hurts to swallow, OTC; IBU, )     ST since mon, severe  Painful swallowing, tolerating solids and liquids  Poss fever Monday, tactile  Mild cough, no SOB  Silvana liquids, pain with solids  No abd pain, n/v  Tried IBU without relief  No SOB      Medications:  sertraline  sertraline Tabs  vitamin D3 Caps  Vumerity Cpdr    Allergies:             Patient has no known allergies.    Surgical History:       No past surgical history on file.    Past Social Hx:  Deann Torres  reports that she has never smoked. She has never used smokeless tobacco. She reports current alcohol use. She reports that she does not use drugs.     Past Family Hx:   Deann Torres family history includes Cancer in her father; Thyroid in her brother.       Problem list, medications, and allergies reviewed by myself today in Epic.     Objective:     /62   Pulse (!) 112   Temp 36.5 °C (97.7 °F) (Temporal)   Resp 16   Ht 1.651 m (5' 5\")   Wt 111 kg (244 lb 12.8 oz)   SpO2 91%   BMI 40.74 kg/m²     Physical Exam  Vitals and nursing note reviewed.   Constitutional:       Appearance: Normal appearance. She is ill-appearing.   HENT:      Right Ear: Tympanic membrane and external ear normal.      Left Ear: Tympanic membrane and external ear normal.      Nose: Nose normal.      Mouth/Throat:      Lips: Pink.      Mouth: Mucous membranes are moist. No oral lesions or angioedema.      Palate: No lesions.      Pharynx: Oropharyngeal exudate and posterior oropharyngeal erythema present. No uvula swelling.      Tonsils: Tonsillar exudate present. No tonsillar abscesses.   Eyes:      Conjunctiva/sclera: Conjunctivae normal.   Cardiovascular:      Rate and Rhythm: Normal rate and regular rhythm.      Pulses: Normal pulses.      Heart sounds: Normal heart sounds.   Pulmonary:      Effort: Pulmonary effort is normal. No respiratory distress.      Breath " sounds: Normal breath sounds. No stridor. No wheezing or rhonchi.   Abdominal:      Tenderness: There is no abdominal tenderness.   Musculoskeletal:      Cervical back: No rigidity.   Lymphadenopathy:      Cervical: Cervical adenopathy present.   Skin:     Findings: No rash.   Neurological:      Mental Status: She is alert.     Rapid strep positive  Assessment/Plan:     Diagnosis and Associated Orders:     1. Sore throat  - POCT Rapid Strep A    2. Strep pharyngitis  - cephALEXin (KEFLEX) 500 MG Cap; Take 1 Capsule by mouth 2 times a day for 10 days.  Dispense: 20 Capsule; Refill: 0    3. Tachycardia        Comments/MDM:  Tachycardia manifest as systemic illness.  POSITIVE Strep A.  Discussed antibiotic treatment for full 10 days, salt water gargles, soft foods, cool liquids, ibuprofen and Tylenol for any pain or fevers. Switch out your toothbrush for a new toothbrush in 2-3 days time.  You will be contagious for 24 hours after initiation of antibiotis.   Return to the urgent care if symptoms are not improving or any worsening symptoms or concerns. Present to the emergency room or call 911 if any severe swelling of the throat, difficulty swallowing, difficulty breathing, wheezing, or any other severe concerns.       I considered other causes of pharyngitis including Group C, G strep, peritonsillar abscess, brian's angina, and retropharyngeal abscess but the patient's reported symptoms and my exam do not support these alternative diagnosis based on information I have available today.  This may change and I encouraged the patient to return to clinic if they are experiencing new symptoms or their symptoms fail to resolve with time as we cannot rule out all pathology from a single Urgent Care visit.       I personally reviewed prior external notes and test results pertinent to today's visit.  Red flags discussed as well as indications to present to the Emergency Department.  Supportive care, natural history,  differential diagnoses, and indications for immediate follow-up discussed.  Patient expresses understanding and agrees to plan.  Patient denies any other questions or concerns.    Follow-up with the primary care physician for recheck, reevaluation, and consideration of further management.      Please note that this dictation was created using voice recognition software. I have made a reasonable attempt to correct obvious errors, but I expect that there are errors of grammar and possibly content that I did not discover before finalizing the note.    This note was electronically signed by Marni Denise PA-C

## 2022-12-28 NOTE — LETTER
December 28, 2022    To Whom It May Concern:         This is confirmation that Deann Torres attended her scheduled appointment with Marni Denise P.A.-C. on 12/28/22.  Please excuse patient from work 12/27-12/29.         If you have any questions please do not hesitate to call me at the phone number listed below.    Sincerely,          Marni Denise P.A.-C.  [phone]

## 2022-12-31 ENCOUNTER — OFFICE VISIT (OUTPATIENT)
Dept: URGENT CARE | Facility: CLINIC | Age: 52
End: 2022-12-31
Payer: COMMERCIAL

## 2022-12-31 VITALS
DIASTOLIC BLOOD PRESSURE: 82 MMHG | HEIGHT: 64 IN | OXYGEN SATURATION: 95 % | SYSTOLIC BLOOD PRESSURE: 130 MMHG | RESPIRATION RATE: 16 BRPM | BODY MASS INDEX: 41.83 KG/M2 | HEART RATE: 100 BPM | TEMPERATURE: 97.7 F | WEIGHT: 245 LBS

## 2022-12-31 DIAGNOSIS — R05.1 ACUTE COUGH: ICD-10-CM

## 2022-12-31 PROCEDURE — 99213 OFFICE O/P EST LOW 20 MIN: CPT | Performed by: PHYSICIAN ASSISTANT

## 2022-12-31 RX ORDER — BENZONATATE 100 MG/1
100 CAPSULE ORAL 3 TIMES DAILY PRN
Qty: 21 CAPSULE | Refills: 0 | Status: SHIPPED | OUTPATIENT
Start: 2022-12-31 | End: 2023-07-25

## 2022-12-31 RX ORDER — DEXTROMETHORPHAN HYDROBROMIDE AND PROMETHAZINE HYDROCHLORIDE 15; 6.25 MG/5ML; MG/5ML
5 SYRUP ORAL EVERY 4 HOURS PRN
Qty: 120 ML | Refills: 0 | Status: SHIPPED | OUTPATIENT
Start: 2022-12-31 | End: 2023-07-25

## 2022-12-31 ASSESSMENT — FIBROSIS 4 INDEX: FIB4 SCORE: 0.95

## 2022-12-31 NOTE — PROGRESS NOTES
"Subjective:   Deann Torres is a 52 y.o. female who presents for Cough (Pt states was positive strep on Wednesday, but now has cough. )      HPI  The patient presents to the Urgent Care with complaints of a cough.  Onset of symptoms 5 days ago.  Diagnosed with strep 3 days ago.  She is currently taking Keflex.  Cough has worsened.  Cough is a dry cough.  She has been trying cough drops and tea but no other over-the-counter cough medicine. Denies any fever, chest pain, SOB, vomiting, diarrhea.     Medications:    cephALEXin Caps  sertraline  sertraline Tabs  vitamin D3 Caps  Vumerity Cpdr    Allergies: Patient has no known allergies.    Problem List: Deann Torres does not have any pertinent problems on file.    Surgical History:  No past surgical history on file.    Past Social Hx: Deann Torres  reports that she has never smoked. She has never used smokeless tobacco. She reports current alcohol use. She reports that she does not use drugs.     Past Family Hx:  Deann Torres family history includes Cancer in her father; Thyroid in her brother.     Problem list, medications, and allergies reviewed by myself today in Epic.     Objective:     /82   Pulse 100   Temp 36.5 °C (97.7 °F)   Resp 16   Ht 1.626 m (5' 4\")   Wt 111 kg (245 lb)   SpO2 95%   BMI 42.05 kg/m²     Physical Exam  Vitals reviewed.   Constitutional:       General: She is not in acute distress.     Appearance: Normal appearance. She is not ill-appearing or toxic-appearing.   HENT:      Head: Normocephalic.      Mouth/Throat:      Mouth: Mucous membranes are moist.      Pharynx: Posterior oropharyngeal erythema present.   Eyes:      Conjunctiva/sclera: Conjunctivae normal.      Pupils: Pupils are equal, round, and reactive to light.   Cardiovascular:      Rate and Rhythm: Normal rate and regular rhythm.      Heart sounds: Normal heart sounds.   Pulmonary:      Effort: Pulmonary effort is normal. No respiratory distress.    "   Breath sounds: Normal breath sounds. No wheezing, rhonchi or rales.   Musculoskeletal:      Cervical back: Neck supple.   Lymphadenopathy:      Cervical: No cervical adenopathy.   Skin:     General: Skin is warm and dry.   Neurological:      General: No focal deficit present.      Mental Status: She is alert and oriented to person, place, and time.   Psychiatric:         Mood and Affect: Mood normal.         Behavior: Behavior normal.       Diagnosis and associated orders:     1. Acute cough  - promethazine-dextromethorphan (PROMETHAZINE-DM) 6.25-15 MG/5ML syrup; Take 5 mL by mouth every four hours as needed for Cough.  Dispense: 120 mL; Refill: 0  - benzonatate (TESSALON) 100 MG Cap; Take 1 Capsule by mouth 3 times a day as needed for Cough.  Dispense: 21 Capsule; Refill: 0       Comments/MDM:     Continue Keflex for the strep infection  May try cough medicine as prescribed.  Over-the-counter cough medicine during the day, nasal saline washes, Flonase nasal spray, over-the-counter decongestants.  Patient overall well-appearing in no acute distress.  Normal vital signs.  Lungs are clear to auscultation bilaterally.       I personally reviewed prior external notes and test results pertinent to today's visit. Pathogenesis of diagnosis discussed including typical length and natural progression. Supportive care, natural history, differential diagnoses, and indications for immediate follow-up discussed. Patient expresses understanding and agrees to plan. Patient denies any other questions or concerns.     Follow-up with the primary care physician for recheck, reevaluation, and consideration of further management.    Please note that this dictation was created using voice recognition software. I have made a reasonable attempt to correct obvious errors, but I expect that there are errors of grammar and possibly content that I did not discover before finalizing the note.    This note was electronically signed by Naldo  DAY Ibarra

## 2023-04-21 ENCOUNTER — HOSPITAL ENCOUNTER (OUTPATIENT)
Dept: RADIOLOGY | Facility: MEDICAL CENTER | Age: 53
End: 2023-04-21
Attending: REGISTERED NURSE
Payer: COMMERCIAL

## 2023-04-21 DIAGNOSIS — Z12.31 VISIT FOR SCREENING MAMMOGRAM: ICD-10-CM

## 2023-04-21 PROCEDURE — 77063 BREAST TOMOSYNTHESIS BI: CPT

## 2023-05-25 NOTE — TELEPHONE ENCOUNTER
Vumerity 231mg CPDR     Ran t/c 120/30ds PRODUCT/SERVICE NOT APPROPRIATE FOR THIS LOCATION. No pa required at this time

## 2023-07-19 ENCOUNTER — TELEPHONE (OUTPATIENT)
Dept: NEUROLOGY | Facility: MEDICAL CENTER | Age: 53
End: 2023-07-19
Payer: COMMERCIAL

## 2023-07-19 NOTE — TELEPHONE ENCOUNTER
NEUROLOGY PATIENT PRE-VISIT PLANNING     Patient was NOT contacted to complete PVP.  Note: Patient will not be contacted if there is no indication to call.     Patient Appointment is scheduled as: New Patient - Previous MB pt    Is visit type and length scheduled correctly? Yes    [facility] Patient is checked in Patient Demographics? Yes    3.   Is referral attached to visit? Yes    4. Were records received from referring provider? Yes    4. Patient was NOT contacted to have someone accompany them to visit.     5. Is this appointment scheduled as a Hospital Follow-Up?  No    6. Does the patient require any pre procedure or post procedure follow up? No    7. If any orders were placed at last visit or intended to be done for this visit do we have Results/Consult Notes? No  Labs - Labs were not ordered at last office visit.  Imaging - Imaging was not ordered at last office visit.  Referrals - No referrals were ordered at last office visit.  Note: If patient appointment is for lab or imaging review and patient did not complete the studies, check with provider if OK to reschedule patient until completed.    8. If patient appointment is for Botox - is order pended for provider? N/A

## 2023-07-25 ENCOUNTER — TELEPHONE (OUTPATIENT)
Dept: NEUROLOGY | Facility: MEDICAL CENTER | Age: 53
End: 2023-07-25

## 2023-07-25 ENCOUNTER — OFFICE VISIT (OUTPATIENT)
Dept: NEUROLOGY | Facility: MEDICAL CENTER | Age: 53
End: 2023-07-25
Attending: PSYCHIATRY & NEUROLOGY
Payer: COMMERCIAL

## 2023-07-25 VITALS
WEIGHT: 249.56 LBS | DIASTOLIC BLOOD PRESSURE: 78 MMHG | SYSTOLIC BLOOD PRESSURE: 142 MMHG | BODY MASS INDEX: 41.58 KG/M2 | HEART RATE: 87 BPM | OXYGEN SATURATION: 96 % | HEIGHT: 65 IN | TEMPERATURE: 97 F

## 2023-07-25 DIAGNOSIS — G35 MULTIPLE SCLEROSIS (HCC): Primary | ICD-10-CM

## 2023-07-25 PROCEDURE — 99212 OFFICE O/P EST SF 10 MIN: CPT | Performed by: PSYCHIATRY & NEUROLOGY

## 2023-07-25 PROCEDURE — 99215 OFFICE O/P EST HI 40 MIN: CPT | Performed by: PSYCHIATRY & NEUROLOGY

## 2023-07-25 PROCEDURE — 3077F SYST BP >= 140 MM HG: CPT | Performed by: PSYCHIATRY & NEUROLOGY

## 2023-07-25 PROCEDURE — 3078F DIAST BP <80 MM HG: CPT | Performed by: PSYCHIATRY & NEUROLOGY

## 2023-07-25 RX ORDER — DIROXIMEL FUMARATE 231 MG/1
462 CAPSULE ORAL 2 TIMES DAILY
Qty: 360 CAPSULE | Refills: 3 | Status: SHIPPED | OUTPATIENT
Start: 2023-07-25 | End: 2023-10-23

## 2023-07-25 ASSESSMENT — PATIENT HEALTH QUESTIONNAIRE - PHQ9: CLINICAL INTERPRETATION OF PHQ2 SCORE: 0

## 2023-07-25 ASSESSMENT — FIBROSIS 4 INDEX: FIB4 SCORE: 0.97

## 2023-07-25 NOTE — TELEPHONE ENCOUNTER
[facility]    NO ABHISHEK delgado'd - Patient needs to fill elsewhere - MUST BE FILLED AT SPECIALTY [facility] CALL [phone] PRODUCT/SERVICE NOT APPROPRIATE FOR THIS LOCATION  COPAY CARD ELIGIBLE, will have liaison release. - 07/25/2023 10:04am

## 2023-07-25 NOTE — PROGRESS NOTES
"[facility] NEUROLOGY  MULTIPLE SCLEROSIS & NEUROIMMUNOLOGY  NEW PATIENT VISIT    Referral source: none    DISEASE SUMMARY:  Principal neurologic diagnosis: MS  Diagnosis of MS: 12/2016  Disease History:  - 2018: vertigo, workup included MRI brain, referred to radiology  - 2/2019: started Tecfidera  - 4/2021: switched to Vumerity  Disease course at onset: relapsing/remitting  Current disease course: stable  Previous disease therapies:  - Tecfidera  Current disease therapies:  - Vumerity  Symptomatic therapies:  - none  CSF (11/27/2018):  - OCBs: positive (14)  Other Testing:  - none  MRI head:  - 7/8/2022: \"stable... no active enhancing lesion, new lesion, overall progression of lesion burden since [last images]...\"  - 8/31/2020: \"no active enhancing lesions, discrete new lesion, or overall progression since [last images]  - 7/19/2019: \"multiple scattered T2 hyperintense lesions in the periventricular and juxtacortical cerebral white matter are nonspecific... no enhancing lesions...\"  MRI cervical spine:  - 7/8/2022: \"stable... demyelinating lesions... at C3 and upper thoracic cord... no new lesion, active enhancing lesion, or overall progression of lesion burden...\"  - 8/31/2020: \"stable T2 hyper-intense foci in the cervical and upper thoracic cord... no new or enhancing lesions...\"  - 7/19/2019: \"no new or enhancing lesions...\"  - 11/18/2020: \"no focal abnormal enhancement...\"  MRI thoracic spine:  - 7/8/2022: \"stable demyelinating lesion in the upper thoracic cord... no new lesion, active enhancing lesion, or overall progression...\"  - 8/31/2020: \"no significant change... no associated enhancement... no new lesions...\"  - 7/19/2019: \"no enhancing lesion...\"  - 11/20/2018: \"no focal edema or demyelination... no abnormal enhancement...\"  Immunizations:  - influenza?:   - Pneumonia?:  - SARS-CoV-2?:   Cancer Screens:  - mammogram:   - PAP?:   - skin check:     CC: \"multiple sclerosis\"    HISTORY OF ILLNESS:  Deann Crump" Brian is a 53 y.o. woman with a history most notable for MS, and anxiety.  Today, she was unaccompanied, and she provided the following history:    I have summarized the pertinent clinical history above.    Deann continues on Vuvmerity.  She misses a dose perhaps twice monthly.  She tolerates this well with abdominal pain, diarrhea, and flushing ~2 times/month.  These symptoms are not severe enough that she would consider switching agents.    She has not experienced any new or worsened symptoms since starting immunotherapy.    A review of MS-related symptoms was notable for the following:    Fatigue: no  Weakness: no  Numbness: no  Incoordination: no  Spasms/Spasticity: no  Vision Impairment: no  Walking/Balance Problems: no  Neuralgia: no  Bowel Symptoms: no  Bladder Symptoms: no  Heat Sensitivity: no  Depression: no  Cognitive/Memory Problems: no  Sexual Dysfunction:  not assessed  Anxiety: no    MEDICAL AND SURGICAL HISTORY:  Past Medical History:   Diagnosis Date    Chickenpox     Depression with anxiety 2018    taking meds    MS (multiple sclerosis) ([facility]) 2018    Sleep apnea 2019    no CPAP-not able to tolerate    Snoring     Vitamin D deficiency      No past surgical history on file.  MEDICATIONS:  Current Outpatient Medications   Medication Sig    Diroximel Fumarate (VUMERITY) 231 MG CAPSULE DELAYED RELEASE Take 231 mg by mouth 2 times a day.    sertraline (ZOLOFT) 50 MG Tab Take 1 Tablet by mouth every day.    Cholecalciferol (VITAMIN D3) 5000 units Cap Take 1 Cap by mouth every day.    promethazine-dextromethorphan (PROMETHAZINE-DM) 6.25-15 MG/5ML syrup Take 5 mL by mouth every four hours as needed for Cough. (Patient not taking: Reported on 7/25/2023)    benzonatate (TESSALON) 100 MG Cap Take 1 Capsule by mouth 3 times a day as needed for Cough. (Patient not taking: Reported on 7/25/2023)    sertraline (ZOLOFT) 25 MG tablet TAKE 2 TABLETS BY MOUTH EVERY DAY (Patient not taking: Reported on 7/25/2023)      SOCIAL HISTORY:  Social History     Tobacco Use    Smoking status: Never    Smokeless tobacco: Never   Substance Use Topics    Alcohol use: Yes     Comment: 4 drinks/week     Social History     Social History Narrative    Not on file     FAMILY HISTORY:  Family History   Problem Relation Age of Onset    Cancer Father         Lung Cancer, Prostate Cancer    Thyroid Brother      REVIEW OF SYSTEMS:  A ROS was completed.  Pertinent positives and negatives were included in the HPI, above.  All other systems were reviewed and are negative.    PHYSICAL EXAM:  General/Medical:  - NAD  - hair, skin, nails, and joints were normal  - neck was supple without Lhermitte's phenomenon  - heart rate and rhythm were regular, no carotid bruits appreciated    Neuro:  MENTAL STATUS: awake and alert; no deficits of speech or language; oriented to person, place, and time; affect was appropriate to situation    CRANIAL NERVES:    II: acuity: J1+/J1+, fields: intact to confrontation, pupils: 3/3 to 2/2 without a relative afferent pupillary defect, discs: sharp    III/IV/VI: versions: intact without nystagmus    V: facial sensation: symmetric to light touch    VII: facial expression: symmetric    VIII: hearing: intact to finger rub    IX/X: palate: elevates symmetrically    XI: shoulder shrug: symmetric    XII: tongue: midline    MOTOR:  - bulk: normal throughout  - tone: normal in the upper extremities   Upper Extremity Strength  (R/L)    5/5   Elbow flexion 5/5   Elbow extension 5/5   Shoulder abduction 5/5     Lower Extremity Strength  (R/L)   Hip flexion 5/5   Knee extension 5/5   Knee flexion NT   Ankle plantarflexion NT   Ankle dorsiflexion NT     - heel-walk: intact  - toe-walk: intact  - pronator drift: absent  - abnormal movements: none    SENSATION:  - light touch: grossly intact over the upper- and lower extremities   - vibration (R/L, seconds): NT/NT at the great toes  - pinprick: NT  - proprioception: NT  - Romberg:  "absent    COORDINATION:  - finger to nose: normal, no ataxia on exam  - finger tapping: rapid and accurate, bilaterally    REFLEXES:  Reflex Right Left   BR 2+ 2+   Biceps 2+ 2+   Triceps 2+ 2+   Patellae 2+ 2+   Achilles 2+ 2+   Toes NT NT     GAIT:  - narrow base and normal  - heel-walk: intact  - toe-walk: intact  - tandem gait: intact    QUANTITATIVE SCORES:  Timed 25-foot walk (sec): 4.2 on 7/25/2023.  Assistive device: none    REVIEW OF IMAGING STUDIES:  I have summarized pertinent data above.    REVIEW OF LABORATORY STUDIES:  No recent data available.    ASSESSMENT:  Deann Torres is a 53 y.o. woman with MS as well as depression and anxiety.  She remains clinically stable on Vumerity, which she tolerates well.  She doesn't experience any neurologic symptoms.  Plan to continue Vumerity and obtain drug monitoring labs.  We will follow up in ~1 year.    PLAN:  MS:  - continue Vumerity  - CBC w/ diff and LFTs    Follow-Up:  - Return in about 1 year (around 7/25/2024).    Signed: Tru Forrest M.D.    BILLING DOCUMENTATION:   I spent 42 minutes reviewing the medical record, interviewing and examining the patient, discussing my impression (see \"assessment\" above), and coordinating care.  "

## 2023-11-01 ENCOUNTER — OFFICE VISIT (OUTPATIENT)
Dept: URGENT CARE | Facility: PHYSICIAN GROUP | Age: 53
End: 2023-11-01
Payer: COMMERCIAL

## 2023-11-01 VITALS
TEMPERATURE: 97.1 F | HEART RATE: 92 BPM | OXYGEN SATURATION: 94 % | SYSTOLIC BLOOD PRESSURE: 126 MMHG | RESPIRATION RATE: 16 BRPM | HEIGHT: 65 IN | WEIGHT: 249 LBS | DIASTOLIC BLOOD PRESSURE: 84 MMHG | BODY MASS INDEX: 41.48 KG/M2

## 2023-11-01 DIAGNOSIS — H60.502 ACUTE OTITIS EXTERNA OF LEFT EAR, UNSPECIFIED TYPE: ICD-10-CM

## 2023-11-01 PROCEDURE — 3079F DIAST BP 80-89 MM HG: CPT | Performed by: REGISTERED NURSE

## 2023-11-01 PROCEDURE — 3074F SYST BP LT 130 MM HG: CPT | Performed by: REGISTERED NURSE

## 2023-11-01 PROCEDURE — 99213 OFFICE O/P EST LOW 20 MIN: CPT | Performed by: REGISTERED NURSE

## 2023-11-01 RX ORDER — OFLOXACIN 3 MG/ML
10 SOLUTION AURICULAR (OTIC) DAILY
Qty: 14 ML | Refills: 0 | Status: SHIPPED | OUTPATIENT
Start: 2023-11-01 | End: 2023-11-08

## 2023-11-01 ASSESSMENT — ENCOUNTER SYMPTOMS
DIZZINESS: 0
CHILLS: 0
FEVER: 0
SHORTNESS OF BREATH: 0
NECK PAIN: 0

## 2023-11-01 ASSESSMENT — FIBROSIS 4 INDEX: FIB4 SCORE: 0.97

## 2023-11-01 NOTE — PROGRESS NOTES
"Subjective:   Deann Torres is a 53 y.o. female who presents for Otalgia (Left ear pain/)      Otalgia   Pertinent negatives include no neck pain or rash.   Last night patient started develop pain in her left ear canal and today noticed when she moves her left ear pushes on the tragus that hurts.  Is been no trauma or injury to the ear.  No ear drainage.  No fevers.  No hearing changes.  No recent illness.  Tolerating p.o.  Not using OTCs    Review of Systems   Constitutional:  Negative for chills and fever.   HENT:  Positive for ear pain.    Respiratory:  Negative for shortness of breath.    Cardiovascular:  Negative for chest pain.   Musculoskeletal:  Negative for neck pain.   Skin:  Negative for rash.   Neurological:  Negative for dizziness.       Medications, Allergies, and current problem list reviewed today in Epic.     Objective:     /84 (BP Location: Right arm, Patient Position: Sitting, BP Cuff Size: Large adult)   Pulse 92   Temp 36.2 °C (97.1 °F) (Temporal)   Resp 16   Ht 1.651 m (5' 5\")   Wt 113 kg (249 lb)   SpO2 94%     Physical Exam  Vitals and nursing note reviewed.   Constitutional:       Appearance: Normal appearance. She is not ill-appearing or toxic-appearing.   HENT:      Right Ear: Hearing, tympanic membrane, ear canal and external ear normal. No middle ear effusion.      Left Ear: Hearing and tympanic membrane normal. Tenderness present. No drainage.  No middle ear effusion.      Ears:      Comments: Left-sided canal tenderness on otoscopic exam there is also erythema no drainage.  Left tragal tenderness.  Pain with manipulation of left ear.     Mouth/Throat:      Mouth: Mucous membranes are moist.   Eyes:      Pupils: Pupils are equal, round, and reactive to light.   Cardiovascular:      Rate and Rhythm: Normal rate and regular rhythm.      Heart sounds: No murmur heard.  Pulmonary:      Effort: Pulmonary effort is normal. No respiratory distress.      Breath sounds: Normal " breath sounds.   Musculoskeletal:         General: Normal range of motion.      Cervical back: Normal range of motion.   Skin:     General: Skin is warm and dry.      Capillary Refill: Capillary refill takes less than 2 seconds.      Findings: No rash.   Neurological:      General: No focal deficit present.      Mental Status: She is alert and oriented to person, place, and time.      Cranial Nerves: Cranial nerves 2-12 are intact. No cranial nerve deficit.      Sensory: Sensation is intact.      Motor: Motor function is intact.      Coordination: Coordination is intact.      Gait: Gait is intact.   Psychiatric:         Mood and Affect: Mood normal.         Assessment/Plan:     Diagnosis and associated orders:     1. Acute otitis externa of left ear, unspecified type  ofloxacin otic sol (FLOXIN OTIC) 0.3 % Solution           Comments/MDM:   Differential diagnosis discussed     Pleasant 53-year-old with left ear pain starting last night.  Is no drainage.  No fevers.  No neurological symptoms.  No recent swimming.  No trauma or injury.  Vital signs are all reassuring.  Patient appears well, neurologically intact, the left ear canal is erythemic there is tenderness on exam also tragal tenderness and pain with manipulation of the left ear, TM appears intact.  Normal right ear and throat findings.  Normal neck range of motion.  No rash.  Will start on ofloxacin for otitis externa.  Discussed ear precautions.  Reviewed signs symptoms require immediate attention    Return to clinic or go to ED if symptoms worsen or persist. Indications for ED discussed at length. Patient/Parent/Guardian voices understanding. Follow-up with your primary care provider in 3-5 days. Red flag symptoms discussed. All side effects of medication discussed including allergic response, GI upset, tendon injury, rash, sedation etc.    I personally reviewed prior external notes and test results pertinent to today's visit as well as additional imaging  and testing completed in clinic today.     Please note that this dictation was created using voice recognition software. I have made every reasonable attempt to correct obvious errors, but I expect that there are errors of grammar and possibly content that I did not discover before finalizing the note.    This note was electronically signed by YOKASTA Tidwell

## 2023-11-22 ENCOUNTER — RESEARCH ENCOUNTER (OUTPATIENT)
Dept: RESEARCH | Facility: MEDICAL CENTER | Age: 53
End: 2023-11-22

## 2023-12-22 ENCOUNTER — HOSPITAL ENCOUNTER (OUTPATIENT)
Dept: LAB | Facility: MEDICAL CENTER | Age: 53
End: 2023-12-22
Attending: PSYCHIATRY & NEUROLOGY
Payer: COMMERCIAL

## 2023-12-22 DIAGNOSIS — G35 MULTIPLE SCLEROSIS (HCC): ICD-10-CM

## 2023-12-22 LAB
ALBUMIN SERPL BCP-MCNC: 4.4 G/DL (ref 3.2–4.9)
ALP SERPL-CCNC: 87 U/L (ref 30–99)
ALT SERPL-CCNC: 59 U/L (ref 2–50)
AST SERPL-CCNC: 42 U/L (ref 12–45)
BASOPHILS # BLD AUTO: 0.7 % (ref 0–1.8)
BASOPHILS # BLD: 0.03 K/UL (ref 0–0.12)
BILIRUB CONJ SERPL-MCNC: <0.2 MG/DL (ref 0.1–0.5)
BILIRUB INDIRECT SERPL-MCNC: ABNORMAL MG/DL (ref 0–1)
BILIRUB SERPL-MCNC: 0.6 MG/DL (ref 0.1–1.5)
EOSINOPHIL # BLD AUTO: 0.08 K/UL (ref 0–0.51)
EOSINOPHIL NFR BLD: 1.8 % (ref 0–6.9)
ERYTHROCYTE [DISTWIDTH] IN BLOOD BY AUTOMATED COUNT: 42 FL (ref 35.9–50)
HCT VFR BLD AUTO: 43.1 % (ref 37–47)
HGB BLD-MCNC: 14.9 G/DL (ref 12–16)
IMM GRANULOCYTES # BLD AUTO: 0.02 K/UL (ref 0–0.11)
IMM GRANULOCYTES NFR BLD AUTO: 0.5 % (ref 0–0.9)
LYMPHOCYTES # BLD AUTO: 0.66 K/UL (ref 1–4.8)
LYMPHOCYTES NFR BLD: 15.1 % (ref 22–41)
MCH RBC QN AUTO: 30.7 PG (ref 27–33)
MCHC RBC AUTO-ENTMCNC: 34.6 G/DL (ref 32.2–35.5)
MCV RBC AUTO: 88.7 FL (ref 81.4–97.8)
MONOCYTES # BLD AUTO: 0.47 K/UL (ref 0–0.85)
MONOCYTES NFR BLD AUTO: 10.8 % (ref 0–13.4)
NEUTROPHILS # BLD AUTO: 3.11 K/UL (ref 1.82–7.42)
NEUTROPHILS NFR BLD: 71.1 % (ref 44–72)
NRBC # BLD AUTO: 0 K/UL
NRBC BLD-RTO: 0 /100 WBC (ref 0–0.2)
PLATELET # BLD AUTO: 179 K/UL (ref 164–446)
PMV BLD AUTO: 11.4 FL (ref 9–12.9)
PROT SERPL-MCNC: 7 G/DL (ref 6–8.2)
RBC # BLD AUTO: 4.86 M/UL (ref 4.2–5.4)
WBC # BLD AUTO: 4.4 K/UL (ref 4.8–10.8)

## 2023-12-22 PROCEDURE — 85025 COMPLETE CBC W/AUTO DIFF WBC: CPT

## 2023-12-22 PROCEDURE — 80076 HEPATIC FUNCTION PANEL: CPT

## 2023-12-22 PROCEDURE — 36415 COLL VENOUS BLD VENIPUNCTURE: CPT

## 2023-12-28 NOTE — RESEARCH NOTE
Unable to connect with patient after 3 contact attempts regarding the available studies the patient would qualify for. Consent forms remain available for the patient to sign via sailsquare. Patient has been notified and provided contact information to schedule if they would like to participate.

## 2024-01-20 SDOH — ECONOMIC STABILITY: FOOD INSECURITY: WITHIN THE PAST 12 MONTHS, THE FOOD YOU BOUGHT JUST DIDN'T LAST AND YOU DIDN'T HAVE MONEY TO GET MORE.: NEVER TRUE

## 2024-01-20 SDOH — ECONOMIC STABILITY: INCOME INSECURITY: IN THE LAST 12 MONTHS, WAS THERE A TIME WHEN YOU WERE NOT ABLE TO PAY THE MORTGAGE OR RENT ON TIME?: NO

## 2024-01-20 SDOH — ECONOMIC STABILITY: HOUSING INSECURITY
IN THE LAST 12 MONTHS, WAS THERE A TIME WHEN YOU DID NOT HAVE A STEADY PLACE TO SLEEP OR SLEPT IN A SHELTER (INCLUDING NOW)?: NO

## 2024-01-20 SDOH — HEALTH STABILITY: PHYSICAL HEALTH: ON AVERAGE, HOW MANY MINUTES DO YOU ENGAGE IN EXERCISE AT THIS LEVEL?: 20 MIN

## 2024-01-20 SDOH — HEALTH STABILITY: PHYSICAL HEALTH: ON AVERAGE, HOW MANY DAYS PER WEEK DO YOU ENGAGE IN MODERATE TO STRENUOUS EXERCISE (LIKE A BRISK WALK)?: 3 DAYS

## 2024-01-20 SDOH — ECONOMIC STABILITY: HOUSING INSECURITY: IN THE LAST 12 MONTHS, HOW MANY PLACES HAVE YOU LIVED?: 1

## 2024-01-20 SDOH — ECONOMIC STABILITY: FOOD INSECURITY: WITHIN THE PAST 12 MONTHS, YOU WORRIED THAT YOUR FOOD WOULD RUN OUT BEFORE YOU GOT MONEY TO BUY MORE.: NEVER TRUE

## 2024-01-20 SDOH — ECONOMIC STABILITY: TRANSPORTATION INSECURITY
IN THE PAST 12 MONTHS, HAS THE LACK OF TRANSPORTATION KEPT YOU FROM MEDICAL APPOINTMENTS OR FROM GETTING MEDICATIONS?: NO

## 2024-01-20 SDOH — ECONOMIC STABILITY: INCOME INSECURITY: HOW HARD IS IT FOR YOU TO PAY FOR THE VERY BASICS LIKE FOOD, HOUSING, MEDICAL CARE, AND HEATING?: NOT HARD AT ALL

## 2024-01-20 SDOH — HEALTH STABILITY: MENTAL HEALTH
STRESS IS WHEN SOMEONE FEELS TENSE, NERVOUS, ANXIOUS, OR CAN'T SLEEP AT NIGHT BECAUSE THEIR MIND IS TROUBLED. HOW STRESSED ARE YOU?: NOT AT ALL

## 2024-01-20 SDOH — ECONOMIC STABILITY: TRANSPORTATION INSECURITY
IN THE PAST 12 MONTHS, HAS LACK OF RELIABLE TRANSPORTATION KEPT YOU FROM MEDICAL APPOINTMENTS, MEETINGS, WORK OR FROM GETTING THINGS NEEDED FOR DAILY LIVING?: NO

## 2024-01-20 SDOH — ECONOMIC STABILITY: TRANSPORTATION INSECURITY
IN THE PAST 12 MONTHS, HAS LACK OF TRANSPORTATION KEPT YOU FROM MEETINGS, WORK, OR FROM GETTING THINGS NEEDED FOR DAILY LIVING?: NO

## 2024-01-20 ASSESSMENT — LIFESTYLE VARIABLES
HOW MANY STANDARD DRINKS CONTAINING ALCOHOL DO YOU HAVE ON A TYPICAL DAY: 1 OR 2
HOW OFTEN DO YOU HAVE A DRINK CONTAINING ALCOHOL: MONTHLY OR LESS
AUDIT-C TOTAL SCORE: 1
SKIP TO QUESTIONS 9-10: 1
HOW OFTEN DO YOU HAVE SIX OR MORE DRINKS ON ONE OCCASION: NEVER

## 2024-01-20 ASSESSMENT — SOCIAL DETERMINANTS OF HEALTH (SDOH)
HOW MANY DRINKS CONTAINING ALCOHOL DO YOU HAVE ON A TYPICAL DAY WHEN YOU ARE DRINKING: 1 OR 2
WITHIN THE PAST 12 MONTHS, YOU WORRIED THAT YOUR FOOD WOULD RUN OUT BEFORE YOU GOT THE MONEY TO BUY MORE: NEVER TRUE
IN A TYPICAL WEEK, HOW MANY TIMES DO YOU TALK ON THE PHONE WITH FAMILY, FRIENDS, OR NEIGHBORS?: MORE THAN THREE TIMES A WEEK
HOW OFTEN DO YOU HAVE SIX OR MORE DRINKS ON ONE OCCASION: NEVER
HOW OFTEN DO YOU HAVE A DRINK CONTAINING ALCOHOL: MONTHLY OR LESS
DO YOU BELONG TO ANY CLUBS OR ORGANIZATIONS SUCH AS CHURCH GROUPS UNIONS, FRATERNAL OR ATHLETIC GROUPS, OR SCHOOL GROUPS?: NO
ARE YOU MARRIED, WIDOWED, DIVORCED, SEPARATED, NEVER MARRIED, OR LIVING WITH A PARTNER?: NEVER MARRIED
HOW OFTEN DO YOU ATTENT MEETINGS OF THE CLUB OR ORGANIZATION YOU BELONG TO?: NEVER
IN A TYPICAL WEEK, HOW MANY TIMES DO YOU TALK ON THE PHONE WITH FAMILY, FRIENDS, OR NEIGHBORS?: MORE THAN THREE TIMES A WEEK
HOW OFTEN DO YOU ATTEND CHURCH OR RELIGIOUS SERVICES?: NEVER
DO YOU BELONG TO ANY CLUBS OR ORGANIZATIONS SUCH AS CHURCH GROUPS UNIONS, FRATERNAL OR ATHLETIC GROUPS, OR SCHOOL GROUPS?: NO
HOW OFTEN DO YOU GET TOGETHER WITH FRIENDS OR RELATIVES?: ONCE A WEEK
HOW OFTEN DO YOU ATTEND CHURCH OR RELIGIOUS SERVICES?: NEVER
HOW HARD IS IT FOR YOU TO PAY FOR THE VERY BASICS LIKE FOOD, HOUSING, MEDICAL CARE, AND HEATING?: NOT HARD AT ALL
HOW OFTEN DO YOU GET TOGETHER WITH FRIENDS OR RELATIVES?: ONCE A WEEK
ARE YOU MARRIED, WIDOWED, DIVORCED, SEPARATED, NEVER MARRIED, OR LIVING WITH A PARTNER?: NEVER MARRIED
HOW OFTEN DO YOU ATTENT MEETINGS OF THE CLUB OR ORGANIZATION YOU BELONG TO?: NEVER

## 2024-01-23 ENCOUNTER — OFFICE VISIT (OUTPATIENT)
Dept: MEDICAL GROUP | Facility: PHYSICIAN GROUP | Age: 54
End: 2024-01-23
Payer: COMMERCIAL

## 2024-01-23 VITALS
TEMPERATURE: 98.5 F | HEART RATE: 75 BPM | DIASTOLIC BLOOD PRESSURE: 68 MMHG | WEIGHT: 250 LBS | OXYGEN SATURATION: 96 % | SYSTOLIC BLOOD PRESSURE: 110 MMHG | HEIGHT: 65 IN | BODY MASS INDEX: 41.65 KG/M2

## 2024-01-23 DIAGNOSIS — Z11.59 NEED FOR HEPATITIS C SCREENING TEST: ICD-10-CM

## 2024-01-23 DIAGNOSIS — Z13.228 SCREENING FOR METABOLIC DISORDER: ICD-10-CM

## 2024-01-23 DIAGNOSIS — E66.01 OBESITY, MORBID, BMI 40.0-49.9 (HCC): ICD-10-CM

## 2024-01-23 DIAGNOSIS — F41.9 ANXIETY: ICD-10-CM

## 2024-01-23 DIAGNOSIS — G35 MULTIPLE SCLEROSIS (HCC): ICD-10-CM

## 2024-01-23 DIAGNOSIS — Z12.11 COLON CANCER SCREENING: ICD-10-CM

## 2024-01-23 PROCEDURE — 3078F DIAST BP <80 MM HG: CPT | Performed by: NURSE PRACTITIONER

## 2024-01-23 PROCEDURE — 99214 OFFICE O/P EST MOD 30 MIN: CPT | Performed by: NURSE PRACTITIONER

## 2024-01-23 PROCEDURE — 3074F SYST BP LT 130 MM HG: CPT | Performed by: NURSE PRACTITIONER

## 2024-01-23 RX ORDER — DIROXIMEL FUMARATE 231 MG/1
CAPSULE ORAL
COMMUNITY
Start: 2024-01-05

## 2024-01-23 ASSESSMENT — FIBROSIS 4 INDEX: FIB4 SCORE: 1.59

## 2024-01-23 ASSESSMENT — PATIENT HEALTH QUESTIONNAIRE - PHQ9: CLINICAL INTERPRETATION OF PHQ2 SCORE: 0

## 2024-01-23 NOTE — PROGRESS NOTES
Subjective       CC:    Chief Complaint   Patient presents with    [facility]     Zoloft refill        HISTORY OF THE PRESENT ILLNESS: Patient is a pleasant 54 y.o. female, here today to establish care. Prior PCP was BERNARD Moy. She is doing well today, no active concerns.    She was diagnosed with MS around 2018; currently seeing Dr Forrest/neurology; on Vumerity since  (prior was on Tecfidera). She sees dr Christensen for eye exam annually.   States she also lost her dad same year of diagnosis in 2018 and she was started on Zoloft for anxiety.        Patient Active Problem List   Diagnosis    Ear discomfort, right    Vertigo    Obesity, morbid, BMI 40.0-49.9 ([facility])    Allergic rhinitis    Stress    Abnormal brain MRI    Anxiety    Multiple sclerosis ([facility])       Past Medical History:   Diagnosis Date    Chickenpox     Depression with anxiety     taking meds    MS (multiple sclerosis) ([facility]) 2018    Sleep apnea 2019    no CPAP-not able to tolerate    Snoring     Vitamin D deficiency         Current Outpatient Medications Ordered in Epic   Medication Sig Dispense Refill    VUMERITY 231 MG CAPSULE DELAYED RELEASE       sertraline (ZOLOFT) 50 MG Tab Take 1 Tablet by mouth every day. 90 Tablet 3    Cholecalciferol (VITAMIN D3) 5000 units Cap Take 1 Cap by mouth every day.       No current [facility]-ordered facility-administered medications on file.        History reviewed. No pertinent surgical history.     Allergies:  Patient has no known allergies.    Health Maintenance: Completed  ; active in the past with men/women;  LMP 2023; spotting, irregular   Last PAP about 3 years ago at [facility]    ROS:   Review of Systems   Constitutional: Negative.  Negative for fever and malaise/fatigue.   HENT: Negative.     Eyes: Negative.    Respiratory:  Negative for cough, sputum production and shortness of breath.    Cardiovascular:  Negative for chest pain, palpitations and leg swelling.   Gastrointestinal:  "Negative.    Genitourinary: Negative.    Musculoskeletal: Negative.    Neurological: Negative.    Endo/Heme/Allergies: Negative.    Psychiatric/Behavioral: Negative.           Objective       Exam: /68   Pulse 75   Temp 36.9 °C (98.5 °F) (Temporal)   Ht 1.651 m (5' 5\")   Wt 113 kg (250 lb)   SpO2 96%  Body mass index is 41.6 kg/m².    Physical Exam  Constitutional:       Appearance: Normal appearance. She is obese.   Cardiovascular:      Rate and Rhythm: Normal rate and regular rhythm.      Pulses: Normal pulses.      Heart sounds: Normal heart sounds.   Pulmonary:      Effort: Pulmonary effort is normal.      Breath sounds: Normal breath sounds.   Musculoskeletal:      Right lower leg: No edema.      Left lower leg: No edema.   Skin:     General: Skin is warm and dry.   Neurological:      General: No focal deficit present.      Mental Status: She is alert and oriented to person, place, and time.   Psychiatric:         Mood and Affect: Mood normal.         Behavior: Behavior normal.         Thought Content: Thought content normal.         Judgment: Judgment normal.       Labs: reviewed    Assessment & Plan     54 y.o. female with the following -    Problem List Items Addressed This Visit       Obesity, morbid, BMI 40.0-49.9 ([facility])    Relevant Orders    Comp Metabolic Panel    Lipid Profile    HEMOGLOBIN A1C    Patient identified as having weight management issue.  Appropriate orders and counseling given.    Anxiety     She lost her dad same year she was diagnosed with MS in 2018. States she was started on Zoloft at the time for anxiety. She wishes to continue with SSRI at this time. No SI/self harm reported today  - continue Zoloft 50mg QD (refilled today)         Relevant Medications    sertraline (ZOLOFT) 50 MG Tab    Multiple sclerosis (HCC)     Diagnosed with MS in 2018 after she was seen in ER for vertigo and her MRI showed multiple scattered T2 hyperintense lesions in the periventricular and " "juxtacortical cerebral white matter suggestive of multiple sclerosis with active inflammation. She recalls having drop foot in 2016 which resolved after she saw chiropractor; otherwise she had been asymptomatic. Her vertigo at the time was believed to be BPPV as it resolved with Epley. She was initially started on Tecfidera in 2019, this got switched to Vumerity in 2021 for insurance coverage. She is currently seeing Dr Forrest/neurology every 6 months, last visit in 7/2023; she will call for next appointment. She also sees Dr Christensen for annual eye exams.      Last MRI 7/2022  1. Stable supratentorial white matter disease with lesion morphology consistent with multiple sclerosis. No \"active\" enhancing lesion, new lesion, overall progression of lesion burden since 8/31/2020.  2.  No evidence of PML or other opportunistic infection in the case that the patient is on immunosuppression therapy.         Relevant Medications    VUMERITY 231 MG CAPSULE DELAYED RELEASE     Other Visit Diagnoses       Screening for metabolic disorder        Relevant Orders    Comp Metabolic Panel    Lipid Profile    HEMOGLOBIN A1C    TSH WITH REFLEX TO FT4    Need for hepatitis C screening test        Relevant Orders    HEP C VIRUS ANTIBODY    Colon cancer screening        Relevant Orders    COLOGUARD (FIT DNA)            Medications Prescribed Today:  1. Anxiety  - sertraline (ZOLOFT) 50 MG Tab; Take 1 Tablet by mouth every day.  Dispense: 90 Tablet; Refill: 3    Educated in proper administration of medication(s) ordered today including safety, possible SE, risks, benefits, rationale and alternatives to therapy.     Return in about 6 months (around 7/23/2024) for Annual Visit.    Please note that this dictation was created using voice recognition software. I have made every reasonable attempt to correct obvious errors, but I expect that there are errors of grammar and possibly content that I did not discover before finalizing the note.  "

## 2024-01-24 PROBLEM — E66.01 OBESITY, MORBID, BMI 40.0-49.9 (HCC): Status: ACTIVE | Noted: 2018-08-06

## 2024-01-24 ASSESSMENT — ENCOUNTER SYMPTOMS
GASTROINTESTINAL NEGATIVE: 1
PSYCHIATRIC NEGATIVE: 1
COUGH: 0
NEUROLOGICAL NEGATIVE: 1
FEVER: 0
PALPITATIONS: 0
SPUTUM PRODUCTION: 0
EYES NEGATIVE: 1
SHORTNESS OF BREATH: 0
MUSCULOSKELETAL NEGATIVE: 1
CONSTITUTIONAL NEGATIVE: 1

## 2024-01-24 NOTE — ASSESSMENT & PLAN NOTE
She lost her dad same year she was diagnosed with MS in 2018. States she was started on Zoloft at the time for anxiety. She wishes to continue with SSRI at this time. No SI/self harm reported today  - continue Zoloft 50mg QD (refilled today)

## 2024-01-24 NOTE — ASSESSMENT & PLAN NOTE
"Diagnosed with MS in 2018 after she was seen in ER for vertigo and her MRI showed multiple scattered T2 hyperintense lesions in the periventricular and juxtacortical cerebral white matter suggestive of multiple sclerosis with active inflammation. She recalls having drop foot in 2016 which resolved after she saw chiropractor; otherwise she had been asymptomatic. Her vertigo at the time was believed to be BPPV as it resolved with Epley. She was initially started on Tecfidera in 2019, this got switched to Vumerity in 2021 for insurance coverage. She is currently seeing Dr Forrest/neurology every 6 months, last visit in 7/2023; she will call for next appointment. She also sees Dr Christensen for annual eye exams.      Last MRI 7/2022  1. Stable supratentorial white matter disease with lesion morphology consistent with multiple sclerosis. No \"active\" enhancing lesion, new lesion, overall progression of lesion burden since 8/31/2020.  2.  No evidence of PML or other opportunistic infection in the case that the patient is on immunosuppression therapy.  "

## 2024-02-15 ENCOUNTER — HOSPITAL ENCOUNTER (OUTPATIENT)
Dept: LAB | Facility: MEDICAL CENTER | Age: 54
End: 2024-02-15
Attending: NURSE PRACTITIONER
Payer: COMMERCIAL

## 2024-02-15 DIAGNOSIS — Z11.59 NEED FOR HEPATITIS C SCREENING TEST: ICD-10-CM

## 2024-02-15 DIAGNOSIS — Z13.228 SCREENING FOR METABOLIC DISORDER: ICD-10-CM

## 2024-02-15 DIAGNOSIS — E66.01 OBESITY, MORBID, BMI 40.0-49.9 (HCC): ICD-10-CM

## 2024-02-15 LAB
ALBUMIN SERPL BCP-MCNC: 3.9 G/DL (ref 3.2–4.9)
ALBUMIN/GLOB SERPL: 1.3 G/DL
ALP SERPL-CCNC: 84 U/L (ref 30–99)
ALT SERPL-CCNC: 32 U/L (ref 2–50)
ANION GAP SERPL CALC-SCNC: 11 MMOL/L (ref 7–16)
AST SERPL-CCNC: 21 U/L (ref 12–45)
BILIRUB SERPL-MCNC: 0.2 MG/DL (ref 0.1–1.5)
BUN SERPL-MCNC: 11 MG/DL (ref 8–22)
CALCIUM ALBUM COR SERPL-MCNC: 9 MG/DL (ref 8.5–10.5)
CALCIUM SERPL-MCNC: 8.9 MG/DL (ref 8.5–10.5)
CHLORIDE SERPL-SCNC: 108 MMOL/L (ref 96–112)
CHOLEST SERPL-MCNC: 182 MG/DL (ref 100–199)
CO2 SERPL-SCNC: 24 MMOL/L (ref 20–33)
CREAT SERPL-MCNC: 0.66 MG/DL (ref 0.5–1.4)
EST. AVERAGE GLUCOSE BLD GHB EST-MCNC: 97 MG/DL
FASTING STATUS PATIENT QL REPORTED: NORMAL
GFR SERPLBLD CREATININE-BSD FMLA CKD-EPI: 105 ML/MIN/1.73 M 2
GLOBULIN SER CALC-MCNC: 3 G/DL (ref 1.9–3.5)
GLUCOSE SERPL-MCNC: 98 MG/DL (ref 65–99)
HBA1C MFR BLD: 5 % (ref 4–5.6)
HCV AB SER QL: NORMAL
HDLC SERPL-MCNC: 49 MG/DL
LDLC SERPL CALC-MCNC: 118 MG/DL
POTASSIUM SERPL-SCNC: 4.8 MMOL/L (ref 3.6–5.5)
PROT SERPL-MCNC: 6.9 G/DL (ref 6–8.2)
SODIUM SERPL-SCNC: 143 MMOL/L (ref 135–145)
TRIGL SERPL-MCNC: 77 MG/DL (ref 0–149)
TSH SERPL DL<=0.005 MIU/L-ACNC: 3.2 UIU/ML (ref 0.38–5.33)

## 2024-02-15 PROCEDURE — 36415 COLL VENOUS BLD VENIPUNCTURE: CPT

## 2024-02-15 PROCEDURE — 80053 COMPREHEN METABOLIC PANEL: CPT

## 2024-02-15 PROCEDURE — 84443 ASSAY THYROID STIM HORMONE: CPT

## 2024-02-15 PROCEDURE — 80061 LIPID PANEL: CPT

## 2024-02-15 PROCEDURE — 83036 HEMOGLOBIN GLYCOSYLATED A1C: CPT

## 2024-02-15 PROCEDURE — 86803 HEPATITIS C AB TEST: CPT

## 2024-05-15 ENCOUNTER — DOCUMENTATION (OUTPATIENT)
Dept: HEALTH INFORMATION MANAGEMENT | Facility: OTHER | Age: 54
End: 2024-05-15
Payer: COMMERCIAL

## 2024-06-24 ENCOUNTER — TELEPHONE (OUTPATIENT)
Dept: HEALTH INFORMATION MANAGEMENT | Facility: OTHER | Age: 54
End: 2024-06-24

## 2024-07-18 ENCOUNTER — APPOINTMENT (OUTPATIENT)
Dept: NEUROLOGY | Facility: MEDICAL CENTER | Age: 54
End: 2024-07-18
Attending: PSYCHIATRY & NEUROLOGY
Payer: COMMERCIAL

## 2024-07-31 ENCOUNTER — APPOINTMENT (OUTPATIENT)
Dept: NEUROLOGY | Facility: MEDICAL CENTER | Age: 54
End: 2024-07-31
Attending: PSYCHIATRY & NEUROLOGY
Payer: COMMERCIAL

## 2024-08-12 ENCOUNTER — DOCUMENTATION (OUTPATIENT)
Dept: HEALTH INFORMATION MANAGEMENT | Facility: OTHER | Age: 54
End: 2024-08-12
Payer: COMMERCIAL

## 2024-08-26 SDOH — HEALTH STABILITY: PHYSICAL HEALTH: ON AVERAGE, HOW MANY MINUTES DO YOU ENGAGE IN EXERCISE AT THIS LEVEL?: 20 MIN

## 2024-08-26 SDOH — ECONOMIC STABILITY: FOOD INSECURITY: WITHIN THE PAST 12 MONTHS, THE FOOD YOU BOUGHT JUST DIDN'T LAST AND YOU DIDN'T HAVE MONEY TO GET MORE.: NEVER TRUE

## 2024-08-26 SDOH — ECONOMIC STABILITY: FOOD INSECURITY: WITHIN THE PAST 12 MONTHS, YOU WORRIED THAT YOUR FOOD WOULD RUN OUT BEFORE YOU GOT MONEY TO BUY MORE.: SOMETIMES TRUE

## 2024-08-26 SDOH — HEALTH STABILITY: PHYSICAL HEALTH: ON AVERAGE, HOW MANY DAYS PER WEEK DO YOU ENGAGE IN MODERATE TO STRENUOUS EXERCISE (LIKE A BRISK WALK)?: 3 DAYS

## 2024-08-26 SDOH — ECONOMIC STABILITY: INCOME INSECURITY: IN THE LAST 12 MONTHS, WAS THERE A TIME WHEN YOU WERE NOT ABLE TO PAY THE MORTGAGE OR RENT ON TIME?: NO

## 2024-08-26 SDOH — ECONOMIC STABILITY: INCOME INSECURITY: HOW HARD IS IT FOR YOU TO PAY FOR THE VERY BASICS LIKE FOOD, HOUSING, MEDICAL CARE, AND HEATING?: NOT HARD AT ALL

## 2024-08-26 SDOH — HEALTH STABILITY: MENTAL HEALTH
STRESS IS WHEN SOMEONE FEELS TENSE, NERVOUS, ANXIOUS, OR CAN'T SLEEP AT NIGHT BECAUSE THEIR MIND IS TROUBLED. HOW STRESSED ARE YOU?: TO SOME EXTENT

## 2024-08-26 ASSESSMENT — SOCIAL DETERMINANTS OF HEALTH (SDOH)
IN THE PAST 12 MONTHS, HAS THE ELECTRIC, GAS, OIL, OR WATER COMPANY THREATENED TO SHUT OFF SERVICE IN YOUR HOME?: NO
HOW HARD IS IT FOR YOU TO PAY FOR THE VERY BASICS LIKE FOOD, HOUSING, MEDICAL CARE, AND HEATING?: NOT HARD AT ALL
HOW MANY DRINKS CONTAINING ALCOHOL DO YOU HAVE ON A TYPICAL DAY WHEN YOU ARE DRINKING: 3 OR 4
DO YOU BELONG TO ANY CLUBS OR ORGANIZATIONS SUCH AS CHURCH GROUPS UNIONS, FRATERNAL OR ATHLETIC GROUPS, OR SCHOOL GROUPS?: NO
HOW OFTEN DO YOU GET TOGETHER WITH FRIENDS OR RELATIVES?: ONCE A WEEK
ARE YOU MARRIED, WIDOWED, DIVORCED, SEPARATED, NEVER MARRIED, OR LIVING WITH A PARTNER?: NEVER MARRIED
HOW OFTEN DO YOU HAVE A DRINK CONTAINING ALCOHOL: 2-3 TIMES A WEEK
IN A TYPICAL WEEK, HOW MANY TIMES DO YOU TALK ON THE PHONE WITH FAMILY, FRIENDS, OR NEIGHBORS?: TWICE A WEEK
HOW OFTEN DO YOU ATTEND CHURCH OR RELIGIOUS SERVICES?: NEVER
HOW OFTEN DO YOU HAVE SIX OR MORE DRINKS ON ONE OCCASION: WEEKLY
HOW OFTEN DO YOU ATTEND CHURCH OR RELIGIOUS SERVICES?: NEVER
HOW OFTEN DO YOU ATTENT MEETINGS OF THE CLUB OR ORGANIZATION YOU BELONG TO?: NEVER
HOW OFTEN DO YOU ATTENT MEETINGS OF THE CLUB OR ORGANIZATION YOU BELONG TO?: NEVER
IN A TYPICAL WEEK, HOW MANY TIMES DO YOU TALK ON THE PHONE WITH FAMILY, FRIENDS, OR NEIGHBORS?: TWICE A WEEK
DO YOU BELONG TO ANY CLUBS OR ORGANIZATIONS SUCH AS CHURCH GROUPS UNIONS, FRATERNAL OR ATHLETIC GROUPS, OR SCHOOL GROUPS?: NO
ARE YOU MARRIED, WIDOWED, DIVORCED, SEPARATED, NEVER MARRIED, OR LIVING WITH A PARTNER?: NEVER MARRIED
HOW OFTEN DO YOU GET TOGETHER WITH FRIENDS OR RELATIVES?: ONCE A WEEK
WITHIN THE PAST 12 MONTHS, YOU WORRIED THAT YOUR FOOD WOULD RUN OUT BEFORE YOU GOT THE MONEY TO BUY MORE: SOMETIMES TRUE

## 2024-08-26 ASSESSMENT — LIFESTYLE VARIABLES
HOW MANY STANDARD DRINKS CONTAINING ALCOHOL DO YOU HAVE ON A TYPICAL DAY: 3 OR 4
HOW OFTEN DO YOU HAVE A DRINK CONTAINING ALCOHOL: 2-3 TIMES A WEEK
HOW OFTEN DO YOU HAVE SIX OR MORE DRINKS ON ONE OCCASION: WEEKLY
AUDIT-C TOTAL SCORE: 7
SKIP TO QUESTIONS 9-10: 0

## 2024-08-29 ENCOUNTER — OFFICE VISIT (OUTPATIENT)
Dept: NEUROLOGY | Facility: MEDICAL CENTER | Age: 54
End: 2024-08-29
Attending: PSYCHIATRY & NEUROLOGY
Payer: COMMERCIAL

## 2024-08-29 ENCOUNTER — OFFICE VISIT (OUTPATIENT)
Dept: MEDICAL GROUP | Facility: PHYSICIAN GROUP | Age: 54
End: 2024-08-29
Payer: COMMERCIAL

## 2024-08-29 VITALS
BODY MASS INDEX: 41.82 KG/M2 | TEMPERATURE: 98 F | HEART RATE: 68 BPM | OXYGEN SATURATION: 96 % | WEIGHT: 251 LBS | SYSTOLIC BLOOD PRESSURE: 122 MMHG | DIASTOLIC BLOOD PRESSURE: 82 MMHG | HEIGHT: 65 IN

## 2024-08-29 VITALS
TEMPERATURE: 96.8 F | SYSTOLIC BLOOD PRESSURE: 122 MMHG | HEIGHT: 65 IN | BODY MASS INDEX: 42.02 KG/M2 | HEART RATE: 90 BPM | OXYGEN SATURATION: 96 % | WEIGHT: 252.21 LBS | DIASTOLIC BLOOD PRESSURE: 74 MMHG

## 2024-08-29 DIAGNOSIS — F41.9 ANXIETY: ICD-10-CM

## 2024-08-29 DIAGNOSIS — Z00.00 WELLNESS EXAMINATION: ICD-10-CM

## 2024-08-29 DIAGNOSIS — G35 MULTIPLE SCLEROSIS (HCC): Primary | ICD-10-CM

## 2024-08-29 DIAGNOSIS — Z12.31 ENCOUNTER FOR SCREENING MAMMOGRAM FOR MALIGNANT NEOPLASM OF BREAST: ICD-10-CM

## 2024-08-29 DIAGNOSIS — G35 MULTIPLE SCLEROSIS (HCC): ICD-10-CM

## 2024-08-29 DIAGNOSIS — F43.9 STRESS: ICD-10-CM

## 2024-08-29 DIAGNOSIS — Z76.89 ENCOUNTER TO ESTABLISH CARE: ICD-10-CM

## 2024-08-29 PROBLEM — R90.89 ABNORMAL BRAIN MRI: Status: RESOLVED | Noted: 2018-09-06 | Resolved: 2024-08-29

## 2024-08-29 PROBLEM — R42 VERTIGO: Status: RESOLVED | Noted: 2018-08-06 | Resolved: 2024-08-29

## 2024-08-29 PROBLEM — H92.01 EAR DISCOMFORT, RIGHT: Status: RESOLVED | Noted: 2018-08-06 | Resolved: 2024-08-29

## 2024-08-29 PROCEDURE — 99211 OFF/OP EST MAY X REQ PHY/QHP: CPT | Performed by: PSYCHIATRY & NEUROLOGY

## 2024-08-29 ASSESSMENT — PATIENT HEALTH QUESTIONNAIRE - PHQ9: CLINICAL INTERPRETATION OF PHQ2 SCORE: 0

## 2024-08-29 ASSESSMENT — ENCOUNTER SYMPTOMS
FEVER: 0
CHILLS: 0
PALPITATIONS: 0
SHORTNESS OF BREATH: 0

## 2024-08-29 ASSESSMENT — FIBROSIS 4 INDEX
FIB4 SCORE: 1.08
FIB4 SCORE: 1.08

## 2024-08-29 NOTE — PROGRESS NOTES
"[facility] NEUROLOGY  MULTIPLE SCLEROSIS & NEUROIMMUNOLOGY  FOLLOW-UP VISIT    DISEASE SUMMARY:  Principal neurologic diagnosis: MS  Diagnosis of MS: 12/2016  Disease History:  - 2018: vertigo, workup included MRI brain, referred to radiology  - 2/2019: started Tecfidera  - 4/2021: switched to Vumerity  Disease course at onset: relapsing/remitting  Current disease course: stable  Previous disease therapies:  - Tecfidera  Current disease therapies:  - Vumerity  Symptomatic therapies:  - none  CSF (11/27/2018):  - OCBs: positive (14)  Other Testing:  - none  MRI head:  - 7/8/2022: \"stable... no active enhancing lesion, new lesion, overall progression of lesion burden since [last images]...\"  - 8/31/2020: \"no active enhancing lesions, discrete new lesion, or overall progression since [last images]  - 7/19/2019: \"multiple scattered T2 hyperintense lesions in the periventricular and juxtacortical cerebral white matter are nonspecific... no enhancing lesions...\"  MRI cervical spine:  - 7/8/2022: \"stable... demyelinating lesions... at C3 and upper thoracic cord... no new lesion, active enhancing lesion, or overall progression of lesion burden...\"  - 11/18/2020: \"no focal abnormal enhancement...\"  - 8/31/2020: \"stable T2 hyper-intense foci in the cervical and upper thoracic cord... no new or enhancing lesions...\"  - 7/19/2019: \"no new or enhancing lesions...\"  MRI thoracic spine:  - 7/8/2022: \"stable demyelinating lesion in the upper thoracic cord... no new lesion, active enhancing lesion, or overall progression...\"  - 8/31/2020: \"no significant change... no associated enhancement... no new lesions...\"  - 7/19/2019: \"no enhancing lesion...\"  - 11/20/2018: \"no focal edema or demyelination... no abnormal enhancement...\"  Immunizations:  - influenza?:   - Pneumonia?:  - SARS-CoV-2?:   Cancer Screens:  - mammogram:   - PAP?:   - skin check:     CC: MS    INTERVAL HISTORY:  Deann Torres is a 54 y.o. woman with MS as well as " depression and anxiety.  I last saw her in the [facility] on 7/25/2023.  At that time I recommended she continue Vumerity, and I ordered drug monitoring labs.  Today, she was unaccompanied, and she provided the following interval history:    Ms. Torres continues on Vumerity.  She tolerates the medication well with rare flushing and GI symptoms.  These side effects are no bothersome enough that she would consider switching medications.    She remains clinically stable, and she has not noticed any new or worsened neurologic symptoms.    MEDICATIONS:  Current Outpatient Medications   Medication Sig    VUMERITY 231 MG CAPSULE DELAYED RELEASE     sertraline (ZOLOFT) 50 MG Tab Take 1 Tablet by mouth every day.    Cholecalciferol (VITAMIN D3) 5000 units Cap Take 1 Cap by mouth every day.     MEDICAL, SOCIAL, AND FAMILY HISTORY:  There is no change in the patient's ROS or medical, social, or family histories since the previous visit on 7/25/2023.    REVIEW OF SYSTEMS:  A ROS was completed.  Pertinent positives and negatives were included in the HPI, above.  All other systems were reviewed and are negative.    PHYSICAL EXAM:  General/Medical:  - NAD    Neuro:  MENTAL STATUS: awake and alert; no deficits of speech or language; oriented to conversation; affect was appropriate to situation; pleasant, cooperative    CRANIAL NERVES:    II: acuity: NT, fields: NT, pupils: NT, discs: NT    III/IV/VI: versions: grossly intact    V: facial sensation: NT    VII: facial expression: symmetric    VIII: hearing: intact to voice    IX/X: palate: NT    XI: shoulder shrug: NT    XII: tongue: NT    MOTOR:  - bulk: NT  - tone: NT  Upper Extremity Strength (R/L)    NT   Elbow flexion NT   Elbow extension NT   Shoulder abduction NT     Lower Extremity Strength  (R/L)   Hip flexion NT   Knee extension NT   Knee flexion NT   Ankle dorsiflexion NT   Ankle plantarflexion NT     - pronator drift: NT  - abnormal movements: none    SENSATION:  -  light touch: NT  - vibration (R/L, seconds): NT at the great toes  - pinprick: NT  - proprioception: NT  - Romberg: NT    COORDINATION:  - finger to nose: NT  - finger tapping: NT    REFLEXES:  Reflex Right Left   BR NT NT   Biceps NT NT   Triceps NT NT   Patellae NT NT   Achilles NT NT   Toes NT NT     GAIT:  - NT    QUANTITATIVE SCORES:  Timed 25-foot walk (sec): 4.1 on 8/29/2024 (4.2 on 7/25/2023).  Assistive device: none    REVIEW OF IMAGING STUDIES:  No additional data since the last visit.    REVIEW OF LABORATORY STUDIES:  2/15/2024:  - CMP: WNL    ASSESSMENT:  Deann Torres is a 54 y.o. woman with MS, depression, and anxiety.  Plan to continue Vumerity, obtain drug monitoring labs, and repeat imaging of the brain and cervical spine in ~1 year.    PLAN:  MS:  Orders Placed This Encounter    MR-BRAIN-WITH & W/O    MR-CERVICAL SPINE-WITH & W/O    CBC with differential    LFTs     Follow-Up:  - Return in about 1 year (around 8/29/2025).    Signed: Tru Forrest M.D.

## 2024-08-29 NOTE — ASSESSMENT & PLAN NOTE
Patient was diagnosed with MS in 2018. She reports her symptoms are stable. She reports that it was an incidentally finding. She is established with neurology and had a follow-up appointment this morning. She is on Vumerity 231mg qd.

## 2024-08-29 NOTE — PROGRESS NOTES
"Subjective:     CC:  Diagnoses of Encounter to establish care, Multiple sclerosis (HCC), Anxiety, Stress, Encounter for screening mammogram for malignant neoplasm of breast, and Wellness examination were pertinent to this visit.    HISTORY OF THE PRESENT ILLNESS: Patient is a 54 y.o. female. This pleasant patient is here today to establish care.    Multiple sclerosis (HCC)  Patient was diagnosed with MS in 2018. She reports her symptoms are stable. She reports that it was an incidentally finding. She is established with neurology and had a follow-up appointment this morning. She is on Vumerity 231mg qd.     Anxiety  Patient reports that she has had anxiety since 2018. She takes Zoloft 50mg qd.    Stress  Patient reports increase in stress due to recently losing her job. She takes Zoloft 50mg qd.     Patient Active Problem List    Diagnosis Date Noted    Multiple sclerosis (HCC) 12/06/2018    Stress 09/06/2018    Anxiety 09/06/2018    Obesity, morbid, BMI 40.0-49.9 (HCC) 08/06/2018    Allergic rhinitis 08/06/2018     Health Maintenance: Completed    ROS:   Review of Systems   Constitutional:  Negative for chills and fever.   Respiratory:  Negative for shortness of breath.    Cardiovascular:  Negative for chest pain and palpitations.         Objective:     Exam: /82 (BP Location: Right arm, Patient Position: Sitting, BP Cuff Size: Adult)   Pulse 68   Temp 36.7 °C (98 °F) (Temporal)   Ht 1.651 m (5' 5\")   Wt 114 kg (251 lb)   SpO2 96%  Body mass index is 41.77 kg/m².    Physical Exam  Constitutional:       Appearance: Normal appearance.   Cardiovascular:      Rate and Rhythm: Normal rate and regular rhythm.      Heart sounds: Normal heart sounds.   Pulmonary:      Effort: Pulmonary effort is normal. No respiratory distress.      Breath sounds: Normal breath sounds. No stridor. No wheezing, rhonchi or rales.   Neurological:      Mental Status: She is alert.             Assessment & Plan:   54 y.o. female with " the following -    1. Encounter to establish care  Patient presents today to establish care. Chart was reviewed and history was discussed in detail with the patient.    2. Multiple sclerosis (HCC)  Chronic, stable.  Patient was diagnosed with MS in 12/2016.  Patient is established with neurology. Patient is currently on Vumerity 231 mg.  Had a follow-up with her neurologist today.  Her neurologist has ordered MRI brain with and without contrast and MR cervical spine with and without contrast.  Patient is to follow-up with neurology in 1 year.    3. Anxiety  4. Stress  Chronic, ongoing.  Continue Zoloft 50 mg daily.  - sertraline (ZOLOFT) 50 MG Tab; Take 1 Tablet by mouth every day.  Dispense: 90 Tablet; Refill: 3    5. Encounter for screening mammogram for malignant neoplasm of breast  - MA-SCREENING MAMMO BILAT W/TOMOSYNTHESIS W/CAD; Future    6. Wellness examination  - Comp Metabolic Panel; Future  - Lipid Profile; Future  - TSH WITH REFLEX TO FT4; Future  - CBC WITHOUT DIFFERENTIAL; Future          Return in about 6 months (around 2/28/2025) for Annual.    Please note that this dictation was created using voice recognition software. I have made every reasonable attempt to correct obvious errors, but I expect that there are errors of grammar and possibly content that I did not discover before finalizing the note.   Retention Suture Bite Size: 3 mm

## 2024-10-04 ENCOUNTER — HOSPITAL ENCOUNTER (OUTPATIENT)
Dept: RADIOLOGY | Facility: MEDICAL CENTER | Age: 54
End: 2024-10-04
Attending: STUDENT IN AN ORGANIZED HEALTH CARE EDUCATION/TRAINING PROGRAM
Payer: COMMERCIAL

## 2024-10-04 DIAGNOSIS — Z12.31 ENCOUNTER FOR SCREENING MAMMOGRAM FOR MALIGNANT NEOPLASM OF BREAST: ICD-10-CM

## 2024-10-04 PROCEDURE — 77067 SCR MAMMO BI INCL CAD: CPT

## 2024-10-09 ENCOUNTER — OFFICE VISIT (OUTPATIENT)
Dept: MEDICAL GROUP | Facility: PHYSICIAN GROUP | Age: 54
End: 2024-10-09
Payer: COMMERCIAL

## 2024-10-09 VITALS
BODY MASS INDEX: 41.65 KG/M2 | HEIGHT: 65 IN | DIASTOLIC BLOOD PRESSURE: 80 MMHG | SYSTOLIC BLOOD PRESSURE: 120 MMHG | OXYGEN SATURATION: 96 % | WEIGHT: 250 LBS | TEMPERATURE: 98 F | HEART RATE: 70 BPM

## 2024-10-09 DIAGNOSIS — D22.9 MULTIPLE MELANOCYTIC NEVUS: ICD-10-CM

## 2024-10-09 PROCEDURE — 3079F DIAST BP 80-89 MM HG: CPT | Performed by: STUDENT IN AN ORGANIZED HEALTH CARE EDUCATION/TRAINING PROGRAM

## 2024-10-09 PROCEDURE — 99213 OFFICE O/P EST LOW 20 MIN: CPT | Performed by: STUDENT IN AN ORGANIZED HEALTH CARE EDUCATION/TRAINING PROGRAM

## 2024-10-09 PROCEDURE — 3074F SYST BP LT 130 MM HG: CPT | Performed by: STUDENT IN AN ORGANIZED HEALTH CARE EDUCATION/TRAINING PROGRAM

## 2024-10-09 ASSESSMENT — ENCOUNTER SYMPTOMS
CHILLS: 0
SHORTNESS OF BREATH: 0
PALPITATIONS: 0
FEVER: 0

## 2024-10-09 ASSESSMENT — FIBROSIS 4 INDEX: FIB4 SCORE: 1.08

## 2024-10-16 ENCOUNTER — HOSPITAL ENCOUNTER (OUTPATIENT)
Dept: LAB | Facility: MEDICAL CENTER | Age: 54
End: 2024-10-16
Attending: PSYCHIATRY & NEUROLOGY
Payer: COMMERCIAL

## 2024-10-16 DIAGNOSIS — G35 MULTIPLE SCLEROSIS (HCC): ICD-10-CM

## 2024-10-16 LAB
ALBUMIN SERPL BCP-MCNC: 4.2 G/DL (ref 3.2–4.9)
ALP SERPL-CCNC: 104 U/L (ref 30–99)
ALT SERPL-CCNC: 50 U/L (ref 2–50)
AST SERPL-CCNC: 37 U/L (ref 12–45)
BASOPHILS # BLD AUTO: 0.9 % (ref 0–1.8)
BASOPHILS # BLD: 0.05 K/UL (ref 0–0.12)
BILIRUB CONJ SERPL-MCNC: <0.2 MG/DL (ref 0.1–0.5)
BILIRUB INDIRECT SERPL-MCNC: ABNORMAL MG/DL (ref 0–1)
BILIRUB SERPL-MCNC: 0.3 MG/DL (ref 0.1–1.5)
EOSINOPHIL # BLD AUTO: 0.09 K/UL (ref 0–0.51)
EOSINOPHIL NFR BLD: 1.7 % (ref 0–6.9)
ERYTHROCYTE [DISTWIDTH] IN BLOOD BY AUTOMATED COUNT: 42.7 FL (ref 35.9–50)
HCT VFR BLD AUTO: 45.4 % (ref 37–47)
HGB BLD-MCNC: 15.2 G/DL (ref 12–16)
IMM GRANULOCYTES # BLD AUTO: 0.02 K/UL (ref 0–0.11)
IMM GRANULOCYTES NFR BLD AUTO: 0.4 % (ref 0–0.9)
LYMPHOCYTES # BLD AUTO: 0.9 K/UL (ref 1–4.8)
LYMPHOCYTES NFR BLD: 16.8 % (ref 22–41)
MCH RBC QN AUTO: 30.4 PG (ref 27–33)
MCHC RBC AUTO-ENTMCNC: 33.5 G/DL (ref 32.2–35.5)
MCV RBC AUTO: 90.8 FL (ref 81.4–97.8)
MONOCYTES # BLD AUTO: 0.52 K/UL (ref 0–0.85)
MONOCYTES NFR BLD AUTO: 9.7 % (ref 0–13.4)
NEUTROPHILS # BLD AUTO: 3.77 K/UL (ref 1.82–7.42)
NEUTROPHILS NFR BLD: 70.5 % (ref 44–72)
NRBC # BLD AUTO: 0 K/UL
NRBC BLD-RTO: 0 /100 WBC (ref 0–0.2)
PLATELET # BLD AUTO: 181 K/UL (ref 164–446)
PMV BLD AUTO: 11.3 FL (ref 9–12.9)
PROT SERPL-MCNC: 6.9 G/DL (ref 6–8.2)
RBC # BLD AUTO: 5 M/UL (ref 4.2–5.4)
WBC # BLD AUTO: 5.4 K/UL (ref 4.8–10.8)

## 2024-10-16 PROCEDURE — 80076 HEPATIC FUNCTION PANEL: CPT

## 2024-10-16 PROCEDURE — 85025 COMPLETE CBC W/AUTO DIFF WBC: CPT

## 2024-10-16 PROCEDURE — 36415 COLL VENOUS BLD VENIPUNCTURE: CPT

## 2025-03-19 ENCOUNTER — TELEPHONE (OUTPATIENT)
Dept: NEUROLOGY | Facility: MEDICAL CENTER | Age: 55
End: 2025-03-19
Payer: COMMERCIAL

## 2025-03-19 NOTE — TELEPHONE ENCOUNTER
Vumerity rep came in today, stating pt needs a new auth on her Vumerity.  Thanks!  Kiki Barrera, Med Ass't

## 2025-03-28 NOTE — TELEPHONE ENCOUNTER
Michelle from [employer] calling stating for patient to continue on their free drug program a new PA needs to be submitted for eligibility review. Cover my meds key [id number].  Thank you.  Kiki Barrera, Med Ass't

## 2025-04-21 ENCOUNTER — TELEPHONE (OUTPATIENT)
Dept: PHARMACY | Facility: MEDICAL CENTER | Age: 55
End: 2025-04-21
Payer: COMMERCIAL

## 2025-04-21 DIAGNOSIS — G35 MULTIPLE SCLEROSIS (HCC): Primary | ICD-10-CM

## 2025-04-21 RX ORDER — DIROXIMEL FUMARATE 231 MG/1
462 CAPSULE ORAL 2 TIMES DAILY
Qty: 360 CAPSULE | Refills: 3 | Status: SHIPPED | OUTPATIENT
Start: 2025-04-21 | End: 2026-04-21

## 2025-04-21 NOTE — TELEPHONE ENCOUNTER
Received New Start PA request via [employer]  for VUMERITY 231 MG CAPSULE DELAYED RELEASE. (Quantity:360, Day Supply:90)     Insurance: [employer]   Member ID:  [id number]  BIN: [id number]  PCN: [id number]  Group: [id number]     Ran Test claim via [place] & medication Rejects stating prior authorization is required.

## 2025-04-23 NOTE — TELEPHONE ENCOUNTER
Prior Authorization for VUMERITY 231 MG CAPSULE DELAYED RELEASE  (Quantity: 360, Days: 90) has been submitted via Cover My Meds: Key ([id number])    Insurance: [employer]    Will follow up in 24-48 business hours.

## 2025-04-24 NOTE — TELEPHONE ENCOUNTER
Prior Authorization for VUMERITY 231 MG CAPSULE DELAYED RELEASE  has been approved for a quantity of 120 , day supply 30    Prior Authorization reference number: [id number]  Insurance: [employer]  Effective dates: 04/23/25 to 4/23/26  Copay: $35     Is patient eligible to fill with [facility]? Yes    Next Steps: The patient's copay exceeds $5.00. Proceed with contacting patient to offer financial assistance.

## 2025-04-24 NOTE — TELEPHONE ENCOUNTER
Called and left a voicemail that the prior auth is approved and ready to go. Releasing to preferred pharmacy as noted on the rx.

## 2025-04-25 ENCOUNTER — HOSPITAL ENCOUNTER (OUTPATIENT)
Dept: LAB | Facility: MEDICAL CENTER | Age: 55
End: 2025-04-25
Attending: STUDENT IN AN ORGANIZED HEALTH CARE EDUCATION/TRAINING PROGRAM
Payer: COMMERCIAL

## 2025-04-25 ENCOUNTER — RESULTS FOLLOW-UP (OUTPATIENT)
Dept: MEDICAL GROUP | Facility: PHYSICIAN GROUP | Age: 55
End: 2025-04-25

## 2025-04-25 DIAGNOSIS — Z00.00 WELLNESS EXAMINATION: ICD-10-CM

## 2025-04-25 LAB
ALBUMIN SERPL BCP-MCNC: 4.3 G/DL (ref 3.2–4.9)
ALBUMIN/GLOB SERPL: 1.4 G/DL
ALP SERPL-CCNC: 83 U/L (ref 30–99)
ALT SERPL-CCNC: 47 U/L (ref 2–50)
ANION GAP SERPL CALC-SCNC: 12 MMOL/L (ref 7–16)
AST SERPL-CCNC: 32 U/L (ref 12–45)
BILIRUB SERPL-MCNC: 0.5 MG/DL (ref 0.1–1.5)
BUN SERPL-MCNC: 13 MG/DL (ref 8–22)
CALCIUM ALBUM COR SERPL-MCNC: 9.2 MG/DL (ref 8.5–10.5)
CALCIUM SERPL-MCNC: 9.4 MG/DL (ref 8.5–10.5)
CHLORIDE SERPL-SCNC: 104 MMOL/L (ref 96–112)
CHOLEST SERPL-MCNC: 198 MG/DL (ref 100–199)
CO2 SERPL-SCNC: 23 MMOL/L (ref 20–33)
CREAT SERPL-MCNC: 0.93 MG/DL (ref 0.5–1.4)
FASTING STATUS PATIENT QL REPORTED: NORMAL
GFR SERPLBLD CREATININE-BSD FMLA CKD-EPI: 73 ML/MIN/1.73 M 2
GLOBULIN SER CALC-MCNC: 3.1 G/DL (ref 1.9–3.5)
GLUCOSE SERPL-MCNC: 97 MG/DL (ref 65–99)
HDLC SERPL-MCNC: 51 MG/DL
LDLC SERPL CALC-MCNC: 131 MG/DL
POTASSIUM SERPL-SCNC: 4.7 MMOL/L (ref 3.6–5.5)
PROT SERPL-MCNC: 7.4 G/DL (ref 6–8.2)
SODIUM SERPL-SCNC: 139 MMOL/L (ref 135–145)
TRIGL SERPL-MCNC: 79 MG/DL (ref 0–149)
TSH SERPL DL<=0.005 MIU/L-ACNC: 2.63 UIU/ML (ref 0.38–5.33)

## 2025-04-25 PROCEDURE — 80061 LIPID PANEL: CPT

## 2025-04-25 PROCEDURE — 80053 COMPREHEN METABOLIC PANEL: CPT

## 2025-04-25 PROCEDURE — 36415 COLL VENOUS BLD VENIPUNCTURE: CPT

## 2025-04-25 PROCEDURE — 84443 ASSAY THYROID STIM HORMONE: CPT

## 2025-05-05 ENCOUNTER — TELEPHONE (OUTPATIENT)
Dept: PHARMACY | Facility: MEDICAL CENTER | Age: 55
End: 2025-05-05
Payer: COMMERCIAL

## 2025-05-05 DIAGNOSIS — G35 MULTIPLE SCLEROSIS (HCC): ICD-10-CM

## 2025-05-05 RX ORDER — DIROXIMEL FUMARATE 231 MG/1
462 CAPSULE ORAL 2 TIMES DAILY
Qty: 360 CAPSULE | Refills: 3 | Status: SHIPPED | OUTPATIENT
Start: 2025-05-05 | End: 2026-05-05

## 2025-05-05 NOTE — TELEPHONE ENCOUNTER
Received New Start  request via [employer]  for VUMERITY 231 MG CAPSULE DELAYED RELEASE. (Quantity:120, Day Supply:30)     Insurance: [employer]  Member ID:  [id number]  BIN: [id number]  PCN: [id number]  Group: [id number]     Ran Test claim via [place] & medication Pays for a $35 copay. Will outreach to patient to offer specialty pharmacy services and or release to preferred pharmacy    Will release to pt's preferred pharmacy.

## 2025-05-09 ENCOUNTER — OFFICE VISIT (OUTPATIENT)
Dept: MEDICAL GROUP | Facility: PHYSICIAN GROUP | Age: 55
End: 2025-05-09
Payer: COMMERCIAL

## 2025-05-09 VITALS
SYSTOLIC BLOOD PRESSURE: 130 MMHG | BODY MASS INDEX: 42.15 KG/M2 | HEIGHT: 65 IN | WEIGHT: 253 LBS | OXYGEN SATURATION: 97 % | TEMPERATURE: 97.9 F | DIASTOLIC BLOOD PRESSURE: 84 MMHG | HEART RATE: 69 BPM

## 2025-05-09 DIAGNOSIS — F41.9 ANXIETY: ICD-10-CM

## 2025-05-09 DIAGNOSIS — Z12.11 COLON CANCER SCREENING: ICD-10-CM

## 2025-05-09 DIAGNOSIS — E66.01 OBESITY, MORBID, BMI 40.0-49.9 (HCC): ICD-10-CM

## 2025-05-09 DIAGNOSIS — E78.00 ELEVATED LDL CHOLESTEROL LEVEL: ICD-10-CM

## 2025-05-09 PROCEDURE — 3079F DIAST BP 80-89 MM HG: CPT | Performed by: STUDENT IN AN ORGANIZED HEALTH CARE EDUCATION/TRAINING PROGRAM

## 2025-05-09 PROCEDURE — 3075F SYST BP GE 130 - 139MM HG: CPT | Performed by: STUDENT IN AN ORGANIZED HEALTH CARE EDUCATION/TRAINING PROGRAM

## 2025-05-09 PROCEDURE — 99214 OFFICE O/P EST MOD 30 MIN: CPT | Performed by: STUDENT IN AN ORGANIZED HEALTH CARE EDUCATION/TRAINING PROGRAM

## 2025-05-09 RX ORDER — SERTRALINE HYDROCHLORIDE 25 MG/1
25 TABLET, FILM COATED ORAL DAILY
Qty: 90 TABLET | Refills: 0 | Status: SHIPPED | OUTPATIENT
Start: 2025-05-09

## 2025-05-09 ASSESSMENT — ENCOUNTER SYMPTOMS
CHILLS: 0
SHORTNESS OF BREATH: 0
PALPITATIONS: 0
FEVER: 0

## 2025-05-09 ASSESSMENT — FIBROSIS 4 INDEX: FIB4 SCORE: 1.37

## 2025-05-09 ASSESSMENT — PATIENT HEALTH QUESTIONNAIRE - PHQ9: CLINICAL INTERPRETATION OF PHQ2 SCORE: 0

## 2025-05-09 NOTE — PROGRESS NOTES
"Subjective:   Verbal consent was acquired by the patient to use [employer] ambient listening note generation during this visit Yes     CC: Medication check    History of Present Illness  Ms. Torres is a pleasant 56 yo who presents for evaluation of medication management and health maintenance.    She has expressed a desire to discontinue her current medication, Zoloft, or at least reduce the dosage by half to assess its impact on her overall well-being. She has been on this medication since 2018, following her diagnosis of multiple sclerosis (MS) and the subsequent passing of her father. Her neurologist had previously advised her to maintain the medication regimen. Currently, she reports feeling stable and in a positive state.    Recent lab results indicate that her LDL levels are slightly elevated. She acknowledges the need to work on this and plans to make dietary and lifestyle changes to address it.    She is due for colon cancer screening and prefers Cologuard over colonoscopy. Additionally, she is due for a Pap smear. She declines hepatitis B, shingles, and pneumonia vaccines today.    Patient Active Problem List    Diagnosis Date Noted    Multiple sclerosis (HCC) 12/06/2018    Stress 09/06/2018    Anxiety 09/06/2018    Obesity, morbid, BMI 40.0-49.9 (HCC) 08/06/2018    Allergic rhinitis 08/06/2018         Health Maintenance: Completed  -Cologuard ordered  -Vaccinations deferred     ROS:  Review of Systems   Constitutional:  Negative for chills and fever.   Respiratory:  Negative for shortness of breath.    Cardiovascular:  Negative for chest pain and palpitations.       Objective:     Exam:  /84 (BP Location: Right arm, Patient Position: Sitting, BP Cuff Size: Adult)   Pulse 69   Temp 36.6 °C (97.9 °F) (Temporal)   Ht 1.651 m (5' 5\")   Wt 115 kg (253 lb)   SpO2 97%   BMI 42.10 kg/m²  Body mass index is 42.1 kg/m².    Physical Exam  Constitutional:       Appearance: Normal appearance. "   Cardiovascular:      Rate and Rhythm: Normal rate and regular rhythm.      Heart sounds: Normal heart sounds.   Pulmonary:      Effort: Pulmonary effort is normal. No respiratory distress.      Breath sounds: Normal breath sounds. No stridor. No wheezing, rhonchi or rales.   Neurological:      Mental Status: She is alert.             Labs:    Latest Reference Range & Units 04/25/25 06:10   Sodium 135 - 145 mmol/L 139   Potassium 3.6 - 5.5 mmol/L 4.7   Chloride 96 - 112 mmol/L 104   Co2 20 - 33 mmol/L 23   Anion Gap 7.0 - 16.0  12.0   Glucose 65 - 99 mg/dL 97   Bun 8 - 22 mg/dL 13   Creatinine 0.50 - 1.40 mg/dL 0.93   GFR (CKD-EPI) >60 mL/min/1.73 m 2 73   Calcium 8.5 - 10.5 mg/dL 9.4   Correct Calcium 8.5 - 10.5 mg/dL 9.2   AST(SGOT) 12 - 45 U/L 32   ALT(SGPT) 2 - 50 U/L 47   Alkaline Phosphatase 30 - 99 U/L 83   Total Bilirubin 0.1 - 1.5 mg/dL 0.5   Albumin 3.2 - 4.9 g/dL 4.3   Total Protein 6.0 - 8.2 g/dL 7.4   Globulin 1.9 - 3.5 g/dL 3.1   A-G Ratio g/dL 1.4   Fasting Status  Fasting   Cholesterol,Tot 100 - 199 mg/dL 198   Triglycerides 0 - 149 mg/dL 79   HDL >=40 mg/dL 51   LDL <100 mg/dL 131 (H)   TSH 0.380 - 5.330 uIU/mL 2.630   (H): Data is abnormally high    Assessment & Plan:     55 y.o. female with the following -     1. Anxiety  Chronic, stable.She has been on Zoloft since 2018 due to her MS diagnosis and subsequent stressors. Currently, she is on 50 mg and wishes to reduce the dosage. A prescription for Zoloft 25 mg tablets will be sent to the pharmacy. She will take 25 mg daily for 2 to 3 weeks, then cut the tablet in half to 12.5 mg daily for an additional 2 weeks. Afterward, she will take 12.5 mg every other day until discontinuation. Potential withdrawal symptoms such as anxiety, insomnia, headaches, dizziness, tiredness, irritability, flu-like symptoms, and nausea have been discussed. She is advised to report any severe withdrawal symptoms.  - sertraline (ZOLOFT) 25 MG tablet; Take 1 Tablet by  mouth every day.  Dispense: 90 Tablet; Refill: 0    2. Elevated LDL cholesterol level  Chronic, uncontrolled. Recent labs showed a slightly elevated LDL level. She has been advised to cut down on saturated fats and red meats. She should aim for 30 to 40 minutes of physical activity daily to help lower the LDL level. Monitoring of LDL levels will continue.      3. Obesity, morbid, BMI 40.0-49.9 (HCC)  Chronic, uncontrolled.  Patient was advised to engage in at least 30 to 40 minutes of daily physical activity.    4. Colon cancer screening  - Cologuard® colon cancer screening            Return if symptoms worsen or fail to improve.    Please note that this dictation was created using voice recognition software. I have made every reasonable attempt to correct obvious errors, but I expect that there are errors of grammar and possibly content that I did not discover before finalizing the note.

## 2025-07-29 ENCOUNTER — HOSPITAL ENCOUNTER (OUTPATIENT)
Dept: RADIOLOGY | Facility: MEDICAL CENTER | Age: 55
End: 2025-07-29
Attending: PSYCHIATRY & NEUROLOGY
Payer: COMMERCIAL

## 2025-07-29 DIAGNOSIS — G35 MULTIPLE SCLEROSIS (HCC): ICD-10-CM

## 2025-07-29 PROCEDURE — 72156 MRI NECK SPINE W/O & W/DYE: CPT

## 2025-07-29 PROCEDURE — 70553 MRI BRAIN STEM W/O & W/DYE: CPT

## 2025-07-29 PROCEDURE — 700117 HCHG RX CONTRAST REV CODE 255: Mod: JZ | Performed by: PSYCHIATRY & NEUROLOGY

## 2025-07-29 PROCEDURE — A9579 GAD-BASE MR CONTRAST NOS,1ML: HCPCS | Mod: JZ | Performed by: PSYCHIATRY & NEUROLOGY

## 2025-07-29 RX ORDER — GADOTERIDOL 279.3 MG/ML
20 INJECTION INTRAVENOUS ONCE
Status: COMPLETED | OUTPATIENT
Start: 2025-07-29 | End: 2025-07-29

## 2025-07-29 RX ADMIN — GADOTERIDOL 20 ML: 279.3 INJECTION, SOLUTION INTRAVENOUS at 09:22

## 2025-07-30 ENCOUNTER — OFFICE VISIT (OUTPATIENT)
Dept: NEUROLOGY | Facility: MEDICAL CENTER | Age: 55
End: 2025-07-30
Attending: PSYCHIATRY & NEUROLOGY
Payer: COMMERCIAL

## 2025-07-30 VITALS
TEMPERATURE: 97 F | BODY MASS INDEX: 43.27 KG/M2 | SYSTOLIC BLOOD PRESSURE: 126 MMHG | HEIGHT: 65 IN | HEART RATE: 90 BPM | WEIGHT: 259.7 LBS | DIASTOLIC BLOOD PRESSURE: 82 MMHG | OXYGEN SATURATION: 95 % | RESPIRATION RATE: 14 BRPM

## 2025-07-30 DIAGNOSIS — G35 MULTIPLE SCLEROSIS (HCC): Primary | ICD-10-CM

## 2025-07-30 PROCEDURE — 99214 OFFICE O/P EST MOD 30 MIN: CPT | Performed by: PSYCHIATRY & NEUROLOGY

## 2025-07-30 ASSESSMENT — PATIENT HEALTH QUESTIONNAIRE - PHQ9: CLINICAL INTERPRETATION OF PHQ2 SCORE: 0

## 2025-07-30 ASSESSMENT — FIBROSIS 4 INDEX: FIB4 SCORE: 1.37

## 2025-07-30 NOTE — PROGRESS NOTES
"[facility] NEUROLOGY  MULTIPLE SCLEROSIS & NEUROIMMUNOLOGY  FOLLOW-UP VISIT    DISEASE SUMMARY:  Principal neurologic diagnosis: MS  Diagnosis of MS: 12/2016  Disease History:  - 2018: vertigo, workup included MRI brain, referred to radiology  - 2/2019: started Tecfidera  - 4/2021: switched to Vumerity  Disease course at onset: relapsing/remitting  Current disease course: stable  Previous disease therapies:  - Tecfidera  Current disease therapies:  - Vumerity  Symptomatic therapies:  - none  CSF (11/27/2018):  - OCBs: positive (14)  Other Testing:  - none  MRI head:  - 7/29/2024: \"stable...\"  - 7/8/2022: \"stable... no active enhancing lesion, new lesion, overall progression of lesion burden since [last images]...\"  - 8/31/2020: \"no active enhancing lesions, discrete new lesion, or overall progression since [last images]  - 7/19/2019: \"multiple scattered T2 hyperintense lesions in the periventricular and juxtacortical cerebral white matter are nonspecific... no enhancing lesions...\"  MRI cervical spine:  - 7/29/2024: \"stable...\"  - 7/8/2022: \"stable... demyelinating lesions... at C3 and upper thoracic cord... no new lesion, active enhancing lesion, or overall progression of lesion burden...\"  - 11/18/2020: \"no focal abnormal enhancement...\"  - 8/31/2020: \"stable T2 hyper-intense foci in the cervical and upper thoracic cord... no new or enhancing lesions...\"  - 7/19/2019: \"no new or enhancing lesions...\"  MRI thoracic spine:  - 7/8/2022: \"stable demyelinating lesion in the upper thoracic cord... no new lesion, active enhancing lesion, or overall progression...\"  - 8/31/2020: \"no significant change... no associated enhancement... no new lesions...\"  - 7/19/2019: \"no enhancing lesion...\"  - 11/20/2018: \"no focal edema or demyelination... no abnormal enhancement...\"  Immunizations:  - influenza?:   - Pneumonia?:  - SARS-CoV-2?:   Cancer Screens:  - mammogram:   - PAP?:   - skin check:     CC: MS    INTERVAL HISTORY:  Deann " Alisia Torres is a 55 y.o. woman with MS as well as depression and anxiety.  I last saw her in the MS Clinic on 8/29/2024.  At that time I recommended she continue Vumerity, and I ordered repeat imaging and drug monitoring labs.  Today, she was unaccompanied, and she provided the following interval history:    Ms. Torres continues on Vumerity.  She tolerates the medication well with rare flushing and GI symptoms.  These side effects are no bothersome enough that she would consider switching medications.    She remains clinically stable, and she has not noticed any new or worsened neurologic symptoms.    MEDICATIONS:  Current Outpatient Medications   Medication Sig    VUMERITY 231 MG CAPSULE DELAYED RELEASE Take 462 mg by mouth 2 times a day.    Cholecalciferol (VITAMIN D3) 5000 units Cap Take 1 Cap by mouth every day.    sertraline (ZOLOFT) 25 MG tablet Take 1 Tablet by mouth every day. (Patient not taking: Reported on 7/30/2025)     MEDICAL, SOCIAL, AND FAMILY HISTORY:  There is no change in the patient's ROS or medical, social, or family histories since the previous visit on 8/29/2024.    REVIEW OF SYSTEMS:  A ROS was completed.  Pertinent positives and negatives were included in the HPI, above.  All other systems were reviewed and are negative.    PHYSICAL EXAM:  General/Medical:  - NAD    Neuro:  MENTAL STATUS: awake and alert; no deficits of speech or language; oriented to conversation; affect was appropriate to situation; pleasant, cooperative    CRANIAL NERVES:    II: acuity: NT, fields: NT, pupils: NT, discs: NT    III/IV/VI: versions: grossly intact    V: facial sensation: symmetric to light touch    VII: facial expression: symmetric    VIII: hearing: intact to voice    IX/X: palate: NT    XI: shoulder shrug: symmetric    XII: tongue: NT    MOTOR:  - bulk: normal throughout  - tone: normal in the upper extremities  Upper Extremity Strength (R/L)    5/5   Elbow flexion 5/5   Elbow extension 5/5   Shoulder  abduction 5/5     Lower Extremity Strength  (R/L)   Hip flexion 5/5   Knee extension NT   Knee flexion NT   Ankle dorsiflexion NT   Ankle plantarflexion NT     - pronator drift: NT  - abnormal movements: none    SENSATION:  - light touch: NT  - vibration (R/L, seconds): NT at the great toes  - pinprick: NT  - proprioception: NT  - Romberg: absent    COORDINATION:  - finger to nose: intact, no dysmetria  - finger tapping: rapid and accurate, bilaterally    REFLEXES:  Reflex Right Left   BR NT NT   Biceps NT NT   Triceps NT NT   Patellae NT NT   Achilles NT NT   Toes NT NT     GAIT:  - narrow base  - normal    QUANTITATIVE SCORES:  Timed 25-foot walk (sec): 4.4 on 2025 (4.1 on 2024, 4.2 on 2023).  Assistive device: none    REVIEW OF IMAGING STUDIES:  I have summarized interval data above.    REVIEW OF LABORATORY STUDIES:  10/16/2024:  - CBC w/ diff: notable for A.90    ASSESSMENT:  Deann Torres is a 55 y.o. woman with MS, depression, and anxiety.  Plan to continue Vumerity and obtain drug monitoring labs.    PLAN:  MS:  - continue Vumerity  Orders Placed This Encounter    CBC with differential     Follow-Up:  - Return in about 6 months (around 2026).    Signed: Tru Forrest M.D.

## (undated) DEVICE — LACTATED RINGERS INJ 1000 ML - (14EA/CA 60CA/PF)

## (undated) DEVICE — SENSOR SPO2 NEO LNCS ADHESIVE (20/BX) SEE USER NOTES

## (undated) DEVICE — SUTURE GENERAL

## (undated) DEVICE — SODIUM CHL IRRIGATION 0.9% 1000ML (12EA/CA)

## (undated) DEVICE — TUBING CLEARLINK DUO-VENT - C-FLO (48EA/CA)

## (undated) DEVICE — BANDAGE ELASTIC 3 X 5 YDS - STERILE VELCRO (25/CA)LATEX

## (undated) DEVICE — PROTECTOR ULNA NERVE - (36PR/CA)

## (undated) DEVICE — ELECTRODE 850 FOAM ADHESIVE - HYDROGEL RADIOTRNSPRNT (50/PK)

## (undated) DEVICE — PACK LOWER EXTREMITY - (2/CA)

## (undated) DEVICE — HEAD HOLDER JUNIOR/ADULT

## (undated) DEVICE — TOURNIQUET, STERILE 18 (RED)

## (undated) DEVICE — KIT ANESTHESIA W/CIRCUIT & 3/LT BAG W/FILTER (20EA/CA)

## (undated) DEVICE — SET EXTENSION WITH 2 PORTS (48EA/CA) ***PART #2C8610 IS A SUBSTITUTE*****

## (undated) DEVICE — SUTURE 4-0 ETHILON FS-2 18 (36PK/BX)"

## (undated) DEVICE — GLOVE BIOGEL SZ 8 SURGICAL PF LTX - (50PR/BX 4BX/CA)

## (undated) DEVICE — SUCTION INSTRUMENT YANKAUER BULBOUS TIP W/O VENT (50EA/CA)

## (undated) DEVICE — SET LEADWIRE 5 LEAD BEDSIDE DISPOSABLE ECG (1SET OF 5/EA)

## (undated) DEVICE — CANISTER SUCTION 3000ML MECHANICAL FILTER AUTO SHUTOFF MEDI-VAC NONSTERILE LF DISP  (40EA/CA)

## (undated) DEVICE — MASK ANESTHESIA ADULT  - (100/CA)

## (undated) DEVICE — GOWN WARMING STANDARD FLEX - (30/CA)